# Patient Record
Sex: FEMALE | Race: WHITE | NOT HISPANIC OR LATINO | Employment: OTHER | URBAN - METROPOLITAN AREA
[De-identification: names, ages, dates, MRNs, and addresses within clinical notes are randomized per-mention and may not be internally consistent; named-entity substitution may affect disease eponyms.]

---

## 2017-01-03 ENCOUNTER — OFFICE VISIT (OUTPATIENT)
Dept: INTERNAL MEDICINE | Age: 62
End: 2017-01-03

## 2017-01-03 VITALS
WEIGHT: 203 LBS | SYSTOLIC BLOOD PRESSURE: 122 MMHG | HEART RATE: 77 BPM | OXYGEN SATURATION: 97 % | HEIGHT: 64 IN | DIASTOLIC BLOOD PRESSURE: 80 MMHG | BODY MASS INDEX: 34.66 KG/M2 | TEMPERATURE: 96.9 F

## 2017-01-03 DIAGNOSIS — N39.0 RECURRENT UTI: Primary | ICD-10-CM

## 2017-01-03 PROCEDURE — 99213 OFFICE O/P EST LOW 20 MIN: CPT | Performed by: INTERNAL MEDICINE

## 2017-01-03 RX ORDER — CEPHALEXIN 250 MG/1
250 CAPSULE ORAL 2 TIMES DAILY PRN
Qty: 20 CAPSULE | Refills: 0 | Status: SHIPPED | OUTPATIENT
Start: 2017-01-03 | End: 2017-02-08

## 2017-01-03 NOTE — MR AVS SNAPSHOT
Kaci Vazquezl   1/3/2017 1:00 PM   Office Visit    Dept Phone:  [phone]   Encounter #:  [id number]    Provider:  Sarwat Valenzuela MD   Department:  [facility] PRIMARY CARE                Your Full Care Plan              Today's Medication Changes          These changes are accurate as of: 1/3/17  2:58 PM.  If you have any questions, ask your nurse or doctor.               New Medication(s)Ordered:     cephalexin 250 MG capsule   Commonly known as:  KEFLEX   Take 1 capsule by mouth 2 (Two) Times a Day As Needed (UTI).   Started by:  Sarwat Valenzuela MD         Stop taking medication(s)listed here:     butalbital-acetaminophen  MG tablet tablet   Stopped by:  Irma Saez MA           cefuroxime 250 MG tablet   Commonly known as:  CEFTIN   Stopped by:  Irma Seaz MA           ciprofloxacin 500 MG tablet   Commonly known as:  CIPRO   Stopped by:  Irma Saez MA           metaxalone 800 MG tablet   Commonly known as:  SKELAXIN   Stopped by:  Irma Saez MA           Omega-3 Fish Oil 1000 MG capsule   Stopped by:  Irma Saez MA           ondansetron 4 MG tablet   Commonly known as:  ZOFRAN   Stopped by:  Irma Saez MA                Where to Get Your Medications      These medications were sent to [place] [phone]  - [phone] [address] [address]     Phone:  [phone]     cephalexin 250 MG capsule                  Your Updated Medication List          This list is accurate as of: 1/3/17  2:58 PM.  Always use your most recent med list.                acebutolol 200 MG capsule   Commonly known as:  SECTRAL       ALPRAZolam 1 MG tablet   Commonly known as:  XANAX   take 1 tablet by mouth twice a day if needed for anxiety       atorvastatin 20 MG tablet   Commonly known as:  LIPITOR   Take 1 tablet by mouth Daily.       butalbital-acetaminophen-caffeine -40 MG per tablet   Commonly known as:  FIORICET, ESGIC       cephalexin 250 MG capsule   Commonly known as:  KEFLEX   Take 1 capsule by mouth 2 (Two) Times a Day As Needed (UTI).       clonazePAM 0.5 MG disintegrating tablet   Commonly known as:  KlonoPIN       FLUoxetine 60 MG tablet   Commonly known as:  PROzac       fluticasone-salmeterol 500-50 MCG/DOSE DISKUS   Commonly known as:  ADVAIR DISKUS   Inhale 1 dose by mouth twice daily.       folic acid 1 MG tablet   Commonly known as:  FOLVITE   Take 1 tablet by mouth daily.       levothyroxine 50 MCG tablet   Commonly known as:  SYNTHROID   Take 1 tablet by mouth Daily.       montelukast 10 MG tablet   Commonly known as:  SINGULAIR   Take 1 tablet by mouth daily.       MULTIVITAMIN ADULT PO       naproxen 500 MG tablet   Commonly known as:  NAPROSYN   Take 1 tablet by mouth 2 (Two) Times a Day As Needed for mild pain (1-3).       nystatin 330970 UNIT/GM ointment   Commonly known as:  MYCOSTATIN       * PROAIR  (90 BASE) MCG/ACT inhaler   Generic drug:  albuterol       * PROAIR  (90 BASE) MCG/ACT inhaler   Generic drug:  albuterol   inhale 2 puffs by mouth every 6 hours if needed       Vitamin D3 2000 UNITS tablet       ZYRTEC ALLERGY 10 MG tablet   Generic drug:  cetirizine       * Notice:  This list has 2 medication(s) that are the same as other medications prescribed for you. Read the directions carefully, and ask your doctor or other care provider to review them with you.            You Were Diagnosed With        Codes Comments    Recurrent UTI    -  Primary ICD-10-CM: N39.0  ICD-9-CM: 599.0       Instructions     None    Patient Instructions History      Upcoming Appointments     Visit Type Date Time Department    HOSPITAL FOLLOW UP 1/3/2017  1:00 PM [facility]    LUMBAR EPIDURAL 3RD 1/5/2017  9:30 AM [facility] PAIN [facility]GT    OFFICE VISIT 2/8/2017 10:20 AM [facility]    [facility] 3/6/2017 11:20 AM  [facility]    NEW PATIENT 3/7/2017  8:00 AM [facility]    OFFICE VISIT 3/13/2017 11:45 AM [facility]      MyChart Signup     Our records indicate that you have an active [facility] [employer] account.    You can view your After Visit Summary by going to [employer] and logging in with your [employer] username and password.  If you don't have a [employer] username and password but a parent or guardian has access to your record, the parent or guardian should login with their own [employer] username and password and access your record to view the After Visit Summary.    If you have questions, you can email [email or web address] or call [phone] to talk to our [employer] staff.  Remember, [employer] is NOT to be used for urgent needs.  For medical emergencies, dial 911.               Other Info from Your Visit           Your Appointments     Jan 05, 2017  9:30 AM EST   Lumbar Epidural 3rd with TREATMENT RM 2 ABDIRASHID PAIN   [facility] PAIN [facility]GT ([place])    [address] [facility] [id number] [phone]            Feb 08, 2017 10:20 AM EST   Office Visit with Sarwat Valenzuela MD   [facility] PRIMARY CARE (--)    [address] [facility] [address]  [place] [id number] [phone]           Arrive 15 minutes prior to appointment.            Mar 06, 2017 11:20 AM EST   [facility] with [facility]OCR[facility], [facility]   [facility] ENDOCRINOLOGY (--)    [address] [facility] [address]  [facility] [address] [phone]            Mar 07, 2017  8:00 AM EST   New Patient with Ofe Bedoya MD   [facility] NEUROLOGY (--)    [address]  [facility] [address] [phone]           Bring all previous medical records and films, along with current medications and insurance information.            Mar 13, 2017 11:45 AM EDT   Office Visit with Giorgi VIERA MD   [facility]  "ENDOCRINOLOGY (--)    [address] [facility] [place] [address] [phone]           Arrive 15 minutes prior to appointment.              Allergies     Codeine  Nausea And Vomiting      Reason for Visit     ER follow UTI 12- pt has had 3 UTI since then      Vital Signs     Blood Pressure Pulse Temperature Height Weight Oxygen Saturation    122/80 77 96.9 °F (36.1 °C) 64\" (162.6 cm) 203 lb (92.1 kg) 97%    Body Mass Index Smoking Status                34.84 kg/m2 Current Every Day Smoker          Problems and Diagnoses Noted     Recurrent UTI    -  Primary        "

## 2017-01-03 NOTE — PROGRESS NOTES
"Kaci A [place] / 61 y.o. / female  01/03/2017    VITALS    Visit Vitals   • /80   • Pulse 77   • Temp 96.9 °F (36.1 °C)   • Ht 64\" (162.6 cm)   • Wt 203 lb (92.1 kg)   • SpO2 97%   • BMI 34.84 kg/m2     BP Readings from Last 3 Encounters:   01/03/17 122/80   12/22/16 129/55   12/06/16 148/72     Wt Readings from Last 3 Encounters:   01/03/17 203 lb (92.1 kg)   12/21/16 197 lb (89.4 kg)   12/06/16 198 lb (89.8 kg)      Body mass index is 34.84 kg/(m^2).    CC:  Main reason(s) for today's visit: ER follow UTI 12- (pt has had 3 UTI since then)      HPI:     Recurrent UTI's and has been seen in ED and treated for UTI's several times. Most recent urine culture was negative for growth. Previous culture was positive for E. Coli susceptible to all abx's. Not sexually active.     ____________________________________________________________________    ASSESSMENT & PLAN:    1. Recurrent UTI      No orders of the defined types were placed in this encounter.      Summary/Discussion:     · Rx sent for Keflex for symptomatic treatment.       No Follow-up on file.    Future Appointments  Date Time Provider Department Center   1/5/2017 9:30 AM TREATMENT RM 2 [facility] PAIN [facility]   2/8/2017 10:20 AM MD YULIET Galeas PC [facility] None   3/6/2017 11:20 AM [facility] ENDOCRINOLOGY [facility], [facility] None   3/7/2017 8:00 AM MD YULIET Garcia N ESPT None   3/13/2017 11:45 AM MD YULIET Richards END [facility] None       ____________________________________________________________________    REVIEW OF SYSTEMS    Review of Systems  Other: As noted per HPI  No current dysuria, no hematuria  No abd pain    PHYSICAL EXAMINATION    Physical Exam  Alert, no distress    REVIEWED DATA:    Labs:   Urine Culture   Order: [id number] - Reflex for Order [id number]   Status:  Final result Visible to patient:  Yes (MyChart)   Specimen Information: Urine, Clean Catch; Urine        Culture   No growth      Resulting Agency: [facility] "      Specimen Collected: 12/21/16  9:49 PM                Lab Results   Component Value Date     12/21/2016    K 4.0 12/21/2016    AST 23 12/21/2016    ALT 24 12/21/2016    BUN 14 12/21/2016    CREATININE 0.78 12/21/2016    CREATININE 0.74 11/19/2016    CREATININE 0.70 02/23/2016    EGFRIFNONA 75 12/21/2016    EGFRIFAFRI 103 02/23/2016     Lab Results   Component Value Date    GLU 96 02/23/2016    GLU 92 04/09/2015    HGBA1C 5.3 02/23/2016    HGBA1C 5.4 04/09/2015     Lab Results   Component Value Date     02/23/2016     (H) 04/09/2015    HDL 32 (L) 02/23/2016    TRIG 359 (H) 02/23/2016     Lab Results   Component Value Date    TSH 0.498 07/08/2016    FREET4 0.98 07/08/2016     Lab Results   Component Value Date    WBC 7.37 12/21/2016    HGB 13.7 12/21/2016     12/21/2016        Imaging:        Medical Tests:        Summary of old records / correspondence / consultant report:        Request outside records:         ALLERGIES:    Codeine    MEDICATIONS:  Current Outpatient Prescriptions on File Prior to Visit   Medication Sig Dispense Refill   • acebutolol (SECTRAL) 200 MG capsule Take 200 mg by mouth Daily.     • albuterol (PROAIR HFA) 108 (90 BASE) MCG/ACT inhaler 2 puffs 2 (Two) Times a Day As Needed.     • ALPRAZolam (XANAX) 1 MG tablet take 1 tablet by mouth twice a day if needed for anxiety 50 tablet 0   • atorvastatin (LIPITOR) 20 MG tablet Take 1 tablet by mouth Daily. 30 tablet 2   • butalbital-acetaminophen-caffeine (FIORICET, ESGIC) -40 MG per tablet As Needed.  0   • cetirizine (ZYRTEC ALLERGY) 10 MG tablet Take 10 mg by mouth daily.     • Cholecalciferol (VITAMIN D3) 2000 UNITS tablet Take  by mouth daily.     • clonazePAM (KlonoPIN) 0.5 MG disintegrating tablet Take 0.5 mg by mouth As Needed (vertigo).     • FLUoxetine (PROzac) 60 MG tablet Take 60 mg by mouth Daily.     • fluticasone-salmeterol (ADVAIR DISKUS) 500-50 MCG/DOSE DISKUS Inhale 1 dose by mouth twice daily.  60 each 5   • folic acid (FOLVITE) 1 MG tablet Take 1 tablet by mouth daily. 30 tablet 5   • levothyroxine (SYNTHROID) 50 MCG tablet Take 1 tablet by mouth Daily. 30 tablet 5   • montelukast (SINGULAIR) 10 MG tablet Take 1 tablet by mouth daily. 30 tablet 5   • Multiple Vitamins-Minerals (MULTIVITAMIN ADULT PO) Take  by mouth daily.     • naproxen (NAPROSYN) 500 MG tablet Take 1 tablet by mouth 2 (Two) Times a Day As Needed for mild pain (1-3). 15 tablet 0   • nystatin (MYCOSTATIN) 671203 UNIT/GM ointment Apply 1 application topically As Needed.     • PROAIR  (90 BASE) MCG/ACT inhaler inhale 2 puffs by mouth every 6 hours if needed 8.5 inhaler 1   • [DISCONTINUED] butalbital-acetaminophen  MG tablet tablet Take 1 tablet by mouth 2 (Two) Times a Day As Needed (tension/migraine headaches). 60 tablet 1   • [DISCONTINUED] cefuroxime (CEFTIN) 250 MG tablet Take 1 tablet by mouth 2 (Two) Times a Day. 20 tablet 0   • [DISCONTINUED] ciprofloxacin (CIPRO) 500 MG tablet Take 1 tablet by mouth 2 (Two) Times a Day. 14 tablet 0   • [DISCONTINUED] metaxalone (SKELAXIN) 800 MG tablet Take 1 tablet by mouth 3 (Three) Times a Day As Needed for muscle spasms. 15 tablet 0   • [DISCONTINUED] Omega-3 Fatty Acids (OMEGA-3 FISH OIL) 1000 MG capsule Take 4 capsules by mouth daily. 90 each    • [DISCONTINUED] ondansetron (ZOFRAN) 4 MG tablet 1-2 tabs every 8 hours prn nausea 21 tablet 0     Current Facility-Administered Medications on File Prior to Visit   Medication Dose Route Frequency Provider Last Rate Last Dose   • FentaNYL Citrate (PF) (SUBLIMAZE) injection 50 mcg  50 mcg Intravenous Q5 Min PRN Sandra Carty MD       • iopamidol (ISOVUE-M 200) injection 41%  12 mL Epidural Once in imaging Sandra Carty MD       • lidocaine (XYLOCAINE) 1 % injection 1 mL  1 mL Intradermal Once Sandra Carty MD       • methylPREDNISolone acetate (DEPO-medrol) injection 80 mg  80 mg Intra-articular Once Sandra Carty  MD       • midazolam (VERSED) injection 1 mg  1 mg Intravenous Q5 Min PRN Sandra Carty MD       • sodium chloride 0.9 % flush 1-10 mL  1-10 mL Intravenous PRN Sandra Carty MD            Replaced by [facility]    The following portions of the patient's history were reviewed and updated as appropriate: Allergies / Current Medications / Past Medical History / Surgical History / Social History / Family History    PROBLEM LIST:    Patient Active Problem List   Diagnosis   • Atopic rhinitis   • Migraine without aura and without status migrainosus, not intractable   • Common variable agammaglobulinemia   • Mixed anxiety depressive disorder   • Fibromyalgia   • Hyperlipidemia   • Multinodular goiter   • Osteoarthritis   • Asthmatic bronchitis   • Subclinical hypothyroidism   • Chronic mixed headache syndrome   • Impaired fasting glucose   • Palpitations   • Vertigo       PAST MEDICAL HX:    Past Medical History   Diagnosis Date   • Allergic rhinitis    • Anxiety    • Arthritis      osteoporosis   • Asthma    • Bleeding disorder      hx of blood clots and PE     • CVID (common variable immunodeficiency)    • Deep vein thrombosis    • Depression    • Fibromyalgia    • Fibromyalgia, primary    • Headache    • Hyperlipidemia    • Hypothyroidism    • Low back pain    • Lupus      skin   • MVP (mitral valve prolapse)    • Osteopenia    • Osteoporosis    • Pneumonia    • Pulmonary embolism    • Sleep apnea      CPAP Machine #11   • Urinary tract infection    • Visual impairment        PAST SURGICAL HX:    Past Surgical History   Procedure Laterality Date   • Hysterectomy     • Neck surgery       C 5 AND 6 TITANIUM PLATE   • Spine surgery       titanium plate C5-6   • No past surgeries     • Knee arthroscopy       calcium scraping   • Cholecystectomy     • Skin biopsy       benign   • Epidural block injection     • Cardiac catheterization     • Partial hysterectomy     • Colonoscopy         SOCIAL HX:    Social History     Social  History   • Marital status:      Spouse name: N/A   • Number of children: N/A   • Years of education: N/A     Occupational History   • Unemployed currently      Social History Main Topics   • Smoking status: Current Every Day Smoker     Packs/day: 1.00     Years: 30.00     Types: Cigarettes   • Smokeless tobacco: Never Used   • Alcohol use No   • Drug use: No   • Sexual activity: Yes     Partners: Male     Birth control/ protection: Post-menopausal, Other      Comment: Hysterectomy     Other Topics Concern   • None     Social History Narrative       FAMILY HX:    Family History   Problem Relation Age of Onset   • Thyroid disease Mother      Hypothyroid   • No Known Problems Father      N/A   • Breast cancer Sister    • Cerebral aneurysm Sister    • Thyroid cancer Cousin    • Cerebral aneurysm Sister    • Arthritis Brother    • Cancer Sister      Breast   • Depression Sister    • Thyroid disease Sister      Hyperthyroid

## 2017-01-05 ENCOUNTER — APPOINTMENT (OUTPATIENT)
Dept: PAIN MEDICINE | Facility: HOSPITAL | Age: 62
End: 2017-01-05

## 2017-01-06 NOTE — TELEPHONE ENCOUNTER
Last OV 1/3/17  Next OV 2/8/17  UDS Submitted 12/28/16  Last RF 11/8/16 #60 +1  Last KSP On File  Contract signed 9/27/16    KD

## 2017-01-06 NOTE — TELEPHONE ENCOUNTER
Apologies....  Last OV 1/3/17  Next OV 2/8/17  UDS Submitted 12/28/16  Last RF 11/8/16 #60 +1  Last KSP On File  Contract signed 9/27/16     KD

## 2017-01-09 RX ORDER — BUTALBITAL, ACETAMINOPHEN AND CAFFEINE 50; 325; 40 MG/1; MG/1; MG/1
TABLET ORAL
Qty: 60 TABLET | Refills: 1 | OUTPATIENT
Start: 2017-01-09 | End: 2017-03-09 | Stop reason: SDUPTHER

## 2017-01-12 DIAGNOSIS — F32.A ANXIETY AND DEPRESSION: ICD-10-CM

## 2017-01-12 DIAGNOSIS — F41.9 ANXIETY AND DEPRESSION: ICD-10-CM

## 2017-01-12 RX ORDER — FLUOXETINE HYDROCHLORIDE 60 MG/1
TABLET, FILM COATED ORAL; ORAL
Qty: 30 TABLET | Refills: 2 | Status: SHIPPED | OUTPATIENT
Start: 2017-01-12 | End: 2017-04-10 | Stop reason: SDUPTHER

## 2017-01-27 RX ORDER — FLUCONAZOLE 100 MG/1
TABLET ORAL
Qty: 14 TABLET | Refills: 0 | OUTPATIENT
Start: 2017-01-27

## 2017-01-30 RX ORDER — FLUCONAZOLE 100 MG/1
TABLET ORAL
Qty: 14 TABLET | Refills: 0 | OUTPATIENT
Start: 2017-01-30

## 2017-02-02 ENCOUNTER — LAB REQUISITION (OUTPATIENT)
Dept: LAB | Facility: HOSPITAL | Age: 62
End: 2017-02-02

## 2017-02-02 DIAGNOSIS — E78.5 HYPERLIPIDEMIA, UNSPECIFIED HYPERLIPIDEMIA TYPE: ICD-10-CM

## 2017-02-02 DIAGNOSIS — Z00.00 ENCOUNTER FOR GENERAL ADULT MEDICAL EXAMINATION WITHOUT ABNORMAL FINDINGS: ICD-10-CM

## 2017-02-02 PROCEDURE — 87086 URINE CULTURE/COLONY COUNT: CPT | Performed by: UROLOGY

## 2017-02-02 RX ORDER — ATORVASTATIN CALCIUM 20 MG/1
TABLET, FILM COATED ORAL
Qty: 30 TABLET | Refills: 2 | Status: SHIPPED | OUTPATIENT
Start: 2017-02-02 | End: 2017-05-05 | Stop reason: SDUPTHER

## 2017-02-04 LAB — BACTERIA SPEC AEROBE CULT: NO GROWTH

## 2017-02-06 DIAGNOSIS — J45.909 UNCOMPLICATED ASTHMA, UNSPECIFIED ASTHMA SEVERITY: ICD-10-CM

## 2017-02-08 ENCOUNTER — OFFICE VISIT (OUTPATIENT)
Dept: INTERNAL MEDICINE | Age: 62
End: 2017-02-08

## 2017-02-08 VITALS
BODY MASS INDEX: 34.66 KG/M2 | HEIGHT: 64 IN | OXYGEN SATURATION: 96 % | SYSTOLIC BLOOD PRESSURE: 116 MMHG | DIASTOLIC BLOOD PRESSURE: 64 MMHG | HEART RATE: 80 BPM | TEMPERATURE: 97.7 F | WEIGHT: 203 LBS

## 2017-02-08 DIAGNOSIS — I71.40 ABDOMINAL AORTIC ANEURYSM (AAA) WITHOUT RUPTURE (HCC): Chronic | ICD-10-CM

## 2017-02-08 DIAGNOSIS — E03.8 SUBCLINICAL HYPOTHYROIDISM: Chronic | ICD-10-CM

## 2017-02-08 DIAGNOSIS — G44.89 CHRONIC MIXED HEADACHE SYNDROME: Primary | Chronic | ICD-10-CM

## 2017-02-08 DIAGNOSIS — G43.009 MIGRAINE WITHOUT AURA AND WITHOUT STATUS MIGRAINOSUS, NOT INTRACTABLE: Chronic | ICD-10-CM

## 2017-02-08 DIAGNOSIS — F41.8 MIXED ANXIETY DEPRESSIVE DISORDER: Chronic | ICD-10-CM

## 2017-02-08 PROCEDURE — 99214 OFFICE O/P EST MOD 30 MIN: CPT | Performed by: INTERNAL MEDICINE

## 2017-02-08 RX ORDER — FLUCONAZOLE 100 MG/1
100 TABLET ORAL DAILY
Qty: 14 TABLET | Refills: 0 | Status: SHIPPED | OUTPATIENT
Start: 2017-02-08 | End: 2017-05-09

## 2017-02-08 NOTE — PROGRESS NOTES
"Kaci Mcgee / 61 y.o. / female  02/08/2017    VITALS    Visit Vitals   • /64   • Pulse 80   • Temp 97.7 °F (36.5 °C)   • Ht 64\" (162.6 cm)   • Wt 203 lb (92.1 kg)   • SpO2 96%   • BMI 34.84 kg/m2     BP Readings from Last 3 Encounters:   02/08/17 116/64   01/03/17 122/80   12/22/16 129/55     Wt Readings from Last 3 Encounters:   02/08/17 203 lb (92.1 kg)   01/03/17 203 lb (92.1 kg)   12/21/16 197 lb (89.4 kg)      Body mass index is 34.84 kg/(m^2).    CC:  Main reason(s) for today's visit: Headache (3 month)      HPI:     History of chronic mixed headache syndrome with history of migraine headaches.  She is taking Sectral and Fioricet.  She has a future appointment with a neurologist for her chronic headaches.    History of chronic depression and anxiety.  She is taking fluoxetine 60 mg daily and alprazolam 1 mg 1-2 tablets daily.  She has also been taking clonazepam on needed basis for vertigo symptoms.    History of mild hypothyroidism on levothyroxine.    Outside imaging showed incidental finding of a small AAA \"2.8 cm\"    ____________________________________________________________________    ASSESSMENT & PLAN:    Problem List Items Addressed This Visit        High    Chronic mixed headache syndrome - Primary (Chronic)    Current Assessment & Plan     Continue Sectral and Fioricet.  Has appointment with neurologist.          Relevant Medications    acebutolol (SECTRAL) 200 MG capsule    butalbital-acetaminophen-caffeine (FIORICET, ESGIC) -40 MG per tablet    FLUoxetine (PROzac) 60 MG tablet       Medium    Migraine without aura and without status migrainosus, not intractable (Chronic)    Relevant Medications    acebutolol (SECTRAL) 200 MG capsule    butalbital-acetaminophen-caffeine (FIORICET, ESGIC) -40 MG per tablet    FLUoxetine (PROzac) 60 MG tablet    Mixed anxiety depressive disorder (Chronic)    Current Assessment & Plan     Long d/w patient about need to wean down on BZDs. Stop " clonazepam. Will starting weaning down on alprazolam gradually. Continue fluoxetine 60 mg qd.          Relevant Medications    ALPRAZolam (XANAX) 1 MG tablet    FLUoxetine (PROzac) 60 MG tablet    Subclinical hypothyroidism (Chronic)    Overview     H/o MNG         Relevant Medications    acebutolol (SECTRAL) 200 MG capsule    levothyroxine (SYNTHROID) 50 MCG tablet    Other Relevant Orders    TSH+Free T4    Abdominal aortic aneurysm (AAA) without rupture (Chronic)    Overview     Noted on imaging outside. Will discuss on follow-up.              Orders Placed This Encounter   Procedures   • TSH+Free T4       Summary/Discussion:     ·       Return in about 3 months (around 5/8/2017) for F/U chronic medical problems.    Future Appointments  Date Time Provider Department Center   3/6/2017 11:20 AM [facility] None   3/7/2017 8:00 AM Ofe Bedoya MD [facility] None   3/13/2017 11:45 AM Giorgi VIERA MD [facility] None   5/9/2017 11:00 AM Sarwat Valenzuela MD [facility] None       ____________________________________________________________________    REVIEW OF SYSTEMS    Review of Systems  As noted per HPI  Constitutional neg   Resp neg   CV neg    Other: As noted per HPI      PHYSICAL EXAMINATION    Physical Exam  Constitutional  No distress   Cardiovascular Rate  normal . Rhythm: regular . Heart sounds:  normal    Pulmonary/Chest  Effort normal. Breath sounds:  normal   Psychiatric  Alert. Judgment and thought content normal. Mood dysphoric.     REVIEWED DATA:    Labs:   Lab Results   Component Value Date     12/21/2016    K 4.0 12/21/2016    AST 23 12/21/2016    ALT 24 12/21/2016    BUN 14 12/21/2016    CREATININE 0.78 12/21/2016    CREATININE 0.74 11/19/2016    CREATININE 0.70 02/23/2016    EGFRIFNONA 75 12/21/2016    EGFRIFAFRI 103 02/23/2016       Lab Results   Component Value Date    GLU 96 02/23/2016    GLU 92 04/09/2015    HGBA1C 5.3 02/23/2016    HGBA1C 5.4  04/09/2015       Lab Results   Component Value Date     02/23/2016     (H) 04/09/2015    HDL 32 (L) 02/23/2016    TRIG 359 (H) 02/23/2016       Lab Results   Component Value Date    TSH 0.498 07/08/2016    FREET4 0.98 07/08/2016          Lab Results   Component Value Date    WBC 7.37 12/21/2016    HGB 13.7 12/21/2016     12/21/2016        Imaging:        Medical Tests:        Summary of old records / correspondence / consultant report:        Request outside records:          ALLERGIES:    Codeine    MEDICATIONS:  Current Outpatient Prescriptions   Medication Sig Dispense Refill   • acebutolol (SECTRAL) 200 MG capsule Take 200 mg by mouth Daily.     • albuterol (PROAIR HFA) 108 (90 BASE) MCG/ACT inhaler 2 puffs 2 (Two) Times a Day As Needed.     • ALPRAZolam (XANAX) 1 MG tablet take 1 tablet by mouth twice a day if needed for anxiety 50 tablet 0   • atorvastatin (LIPITOR) 20 MG tablet take 1 tablet by mouth once daily 30 tablet 2   • butalbital-acetaminophen-caffeine (FIORICET, ESGIC) -40 MG per tablet take 1 tablet by mouth twice a day if needed FOR TENSION/MIGRAINE HEADACHES 60 tablet 1   • cetirizine (ZYRTEC ALLERGY) 10 MG tablet Take 10 mg by mouth daily.     • Cholecalciferol (VITAMIN D3) 2000 UNITS tablet Take  by mouth daily.     • FLUoxetine (PROzac) 60 MG tablet take 1 tablet by mouth once daily 30 tablet 2   • fluticasone-salmeterol (ADVAIR DISKUS) 500-50 MCG/DOSE DISKUS Inhale 1 dose by mouth twice daily. 60 each 5   • folic acid (FOLVITE) 1 MG tablet Take 1 tablet by mouth daily. 30 tablet 5   • levothyroxine (SYNTHROID) 50 MCG tablet Take 1 tablet by mouth Daily. 30 tablet 5   • montelukast (SINGULAIR) 10 MG tablet Take 1 tablet by mouth daily. 30 tablet 5   • PROAIR  (90 BASE) MCG/ACT inhaler inhale 2 puffs by mouth every 6 hours if needed 8.5 inhaler 1   • fluconazole (DIFLUCAN) 100 MG tablet Take 1 tablet by mouth Daily. 14 tablet 0     No current  facility-administered medications for this visit.        [facility]    The following portions of the patient's history were reviewed and updated as appropriate: Allergies / Current Medications / Past Medical History / Surgical History / Social History / Family History    PROBLEM LIST:    Patient Active Problem List   Diagnosis   • Atopic rhinitis   • Migraine without aura and without status migrainosus, not intractable   • Common variable agammaglobulinemia   • Mixed anxiety depressive disorder   • Fibromyalgia   • Hyperlipidemia   • Multinodular goiter   • Osteoarthritis   • Asthmatic bronchitis   • Subclinical hypothyroidism   • Chronic mixed headache syndrome   • Impaired fasting glucose   • Palpitations   • Vertigo   • Abdominal aortic aneurysm (AAA) without rupture       PAST MEDICAL HX:    Past Medical History   Diagnosis Date   • Allergic rhinitis    • Anxiety    • Arthritis      osteoporosis   • Asthma    • Bleeding disorder      hx of blood clots and PE     • CVID (common variable immunodeficiency)    • Deep vein thrombosis    • Depression    • Fibromyalgia    • Fibromyalgia, primary    • Headache    • Hyperlipidemia    • Hypothyroidism    • Low back pain    • Lupus      skin   • MVP (mitral valve prolapse)    • Osteopenia    • Osteoporosis    • Pneumonia    • Pulmonary embolism    • Sleep apnea      CPAP Machine #11   • Urinary tract infection    • Visual impairment        PAST SURGICAL HX:    Past Surgical History   Procedure Laterality Date   • Hysterectomy     • Neck surgery       C 5 AND 6 TITANIUM PLATE   • Spine surgery       titanium plate C5-6   • No past surgeries     • Knee arthroscopy       calcium scraping   • Cholecystectomy     • Skin biopsy       benign   • Epidural block injection     • Cardiac catheterization     • Partial hysterectomy     • Colonoscopy         SOCIAL HX:    Social History     Social History   • Marital status:      Spouse name: N/A   • Number of children: N/A   • Years  of education: N/A     Occupational History   • Unemployed currently      Social History Main Topics   • Smoking status: Current Every Day Smoker     Packs/day: 1.00     Years: 30.00     Types: Cigarettes   • Smokeless tobacco: Never Used   • Alcohol use No   • Drug use: No   • Sexual activity: Yes     Partners: Male     Birth control/ protection: Post-menopausal, Other      Comment: Hysterectomy     Other Topics Concern   • None     Social History Narrative       FAMILY HX:    Family History   Problem Relation Age of Onset   • Thyroid disease Mother      Hypothyroid   • No Known Problems Father      N/A   • Breast cancer Sister    • Cerebral aneurysm Sister    • Thyroid cancer Cousin    • Cerebral aneurysm Sister    • Arthritis Brother    • Cancer Sister      Breast   • Depression Sister    • Thyroid disease Sister      Hyperthyroid

## 2017-02-08 NOTE — ASSESSMENT & PLAN NOTE
Long d/w patient about need to wean down on BZDs. Stop clonazepam. Will starting weaning down on alprazolam gradually. Continue fluoxetine 60 mg qd.

## 2017-02-13 ENCOUNTER — OFFICE VISIT (OUTPATIENT)
Dept: INTERNAL MEDICINE | Age: 62
End: 2017-02-13

## 2017-02-13 VITALS
HEIGHT: 64 IN | OXYGEN SATURATION: 98 % | BODY MASS INDEX: 34.83 KG/M2 | DIASTOLIC BLOOD PRESSURE: 80 MMHG | WEIGHT: 204 LBS | HEART RATE: 104 BPM | SYSTOLIC BLOOD PRESSURE: 130 MMHG | TEMPERATURE: 97.9 F

## 2017-02-13 DIAGNOSIS — H10.32 ACUTE CONJUNCTIVITIS OF LEFT EYE, UNSPECIFIED ACUTE CONJUNCTIVITIS TYPE: Primary | ICD-10-CM

## 2017-02-13 PROCEDURE — 99213 OFFICE O/P EST LOW 20 MIN: CPT | Performed by: INTERNAL MEDICINE

## 2017-02-13 RX ORDER — TOBRAMYCIN 3 MG/ML
2 SOLUTION/ DROPS OPHTHALMIC
Qty: 1 BOTTLE | Refills: 0 | Status: SHIPPED | OUTPATIENT
Start: 2017-02-13 | End: 2017-05-08

## 2017-02-13 NOTE — PROGRESS NOTES
"Kaci Vazquezl / 61 y.o. / female  02/13/2017    VITALS    Visit Vitals   • /80   • Pulse 104   • Temp 97.9 °F (36.6 °C)   • Ht 64\" (162.6 cm)   • Wt 204 lb (92.5 kg)   • SpO2 98%   • BMI 35.02 kg/m2     BP Readings from Last 3 Encounters:   02/13/17 130/80   02/08/17 116/64   01/03/17 122/80     Wt Readings from Last 3 Encounters:   02/13/17 204 lb (92.5 kg)   02/08/17 203 lb (92.1 kg)   01/03/17 203 lb (92.1 kg)      Body mass index is 35.02 kg/(m^2).    CC:  Main reason(s) for today's visit: Pt thinks she has pink eye in her left eye (has bothered her since friday)      HPI:     C/o pink eye on left side w/ matting in the morning. Started Friday. No h/o foreign object getting in the eye. No pain or change in vision  Used some leftover eye abx drops and feel like it is improving.     ____________________________________________________________________    ASSESSMENT & PLAN:    1. Acute conjunctivitis of left eye, unspecified acute conjunctivitis type  If not improving by evening, start abx eye gtts; call if worsening or change in vision. She expressed understanding and agreed to follow above instructions.   - tobramycin 0.3 % solution ophthalmic solution; Administer 2 drops into the left eye Every 4 (Four) Hours.  Dispense: 1 bottle; Refill: 0      Summary/Discussion:     ·       No Follow-up on file.    Future Appointments  Date Time Provider Department Center   3/6/2017 11:20 AM [facility] None   3/7/2017 8:00 AM MD [facility] None   3/13/2017 11:45 AM MD [facility] None   5/9/2017 11:00 AM Sarwat Valenzuela MD [facility] None       ____________________________________________________________________    REVIEW OF SYSTEMS    Review of Systems  Other: As noted per HPI  Left pink eye, no change in vision, matting of lids  No recent uri    PHYSICAL EXAMINATION    Physical Exam   Constitutional: No distress.   Eyes: EOM and lids are normal. " Pupils are equal, round, and reactive to light. Right eye exhibits no chemosis, no discharge and no exudate. No foreign body present in the right eye. Left eye exhibits no chemosis, no discharge and no exudate. No foreign body present in the left eye. Right conjunctiva is not injected. Right conjunctiva has no hemorrhage. Left conjunctiva is not injected. Left conjunctiva has no hemorrhage.         REVIEWED DATA:    Labs:     Lab Results   Component Value Date     12/21/2016    K 4.0 12/21/2016    AST 23 12/21/2016    ALT 24 12/21/2016    BUN 14 12/21/2016    CREATININE 0.78 12/21/2016    CREATININE 0.74 11/19/2016    CREATININE 0.70 02/23/2016    EGFRIFNONA 75 12/21/2016    EGFRIFAFRI 103 02/23/2016     Lab Results   Component Value Date    GLU 96 02/23/2016    GLU 92 04/09/2015    HGBA1C 5.3 02/23/2016    HGBA1C 5.4 04/09/2015     Lab Results   Component Value Date     02/23/2016     (H) 04/09/2015    HDL 32 (L) 02/23/2016    TRIG 359 (H) 02/23/2016     Lab Results   Component Value Date    TSH 0.498 07/08/2016    FREET4 0.98 07/08/2016     Lab Results   Component Value Date    WBC 7.37 12/21/2016    HGB 13.7 12/21/2016     12/21/2016        Imaging:        Medical Tests:        Summary of old records / correspondence / consultant report:        Request outside records:         ALLERGIES:    Codeine    MEDICATIONS:  Current Outpatient Prescriptions   Medication Sig Dispense Refill   • acebutolol (SECTRAL) 200 MG capsule Take 200 mg by mouth Daily.     • albuterol (PROAIR HFA) 108 (90 BASE) MCG/ACT inhaler 2 puffs 2 (Two) Times a Day As Needed.     • ALPRAZolam (XANAX) 1 MG tablet take 1 tablet by mouth twice a day if needed for anxiety 50 tablet 0   • atorvastatin (LIPITOR) 20 MG tablet take 1 tablet by mouth once daily 30 tablet 2   • butalbital-acetaminophen-caffeine (FIORICET, ESGIC) -40 MG per tablet take 1 tablet by mouth twice a day if needed FOR TENSION/MIGRAINE HEADACHES 60  tablet 1   • cetirizine (ZYRTEC ALLERGY) 10 MG tablet Take 10 mg by mouth daily.     • Cholecalciferol (VITAMIN D3) 2000 UNITS tablet Take  by mouth daily.     • fluconazole (DIFLUCAN) 100 MG tablet Take 1 tablet by mouth Daily. 14 tablet 0   • FLUoxetine (PROzac) 60 MG tablet take 1 tablet by mouth once daily 30 tablet 2   • fluticasone-salmeterol (ADVAIR DISKUS) 500-50 MCG/DOSE DISKUS Inhale 1 dose by mouth twice daily. 60 each 5   • folic acid (FOLVITE) 1 MG tablet Take 1 tablet by mouth daily. 30 tablet 5   • levothyroxine (SYNTHROID) 50 MCG tablet Take 1 tablet by mouth Daily. 30 tablet 5   • montelukast (SINGULAIR) 10 MG tablet Take 1 tablet by mouth daily. 30 tablet 5   • PROAIR  (90 BASE) MCG/ACT inhaler inhale 2 puffs by mouth every 6 hours if needed 8.5 inhaler 1   • tobramycin 0.3 % solution ophthalmic solution Administer 2 drops into the left eye Every 4 (Four) Hours. 1 bottle 0     No current facility-administered medications for this visit.         [facility]    The following portions of the patient's history were reviewed and updated as appropriate: Allergies / Current Medications / Past Medical History / Surgical History / Social History / Family History    PROBLEM LIST:    Patient Active Problem List   Diagnosis   • Atopic rhinitis   • Migraine without aura and without status migrainosus, not intractable   • Common variable agammaglobulinemia   • Mixed anxiety depressive disorder   • Fibromyalgia   • Hyperlipidemia   • Multinodular goiter   • Osteoarthritis   • Asthmatic bronchitis   • Subclinical hypothyroidism   • Chronic mixed headache syndrome   • Impaired fasting glucose   • Palpitations   • Vertigo   • Abdominal aortic aneurysm (AAA) without rupture       PAST MEDICAL HX:    Past Medical History   Diagnosis Date   • Allergic rhinitis    • Anxiety    • Arthritis      osteoporosis   • Asthma    • Bleeding disorder      hx of blood clots and PE     • CVID (common variable immunodeficiency)    •  Deep vein thrombosis    • Depression    • Fibromyalgia    • Fibromyalgia, primary    • Headache    • Hyperlipidemia    • Hypothyroidism    • Low back pain    • Lupus      skin   • MVP (mitral valve prolapse)    • Osteopenia    • Osteoporosis    • Pneumonia    • Pulmonary embolism    • Sleep apnea      CPAP Machine #11   • Urinary tract infection    • Visual impairment        PAST SURGICAL HX:    Past Surgical History   Procedure Laterality Date   • Hysterectomy     • Neck surgery       C 5 AND 6 TITANIUM PLATE   • Spine surgery       titanium plate C5-6   • No past surgeries     • Knee arthroscopy       calcium scraping   • Cholecystectomy     • Skin biopsy       benign   • Epidural block injection     • Cardiac catheterization     • Partial hysterectomy     • Colonoscopy         SOCIAL HX:    Social History     Social History   • Marital status:      Spouse name: N/A   • Number of children: N/A   • Years of education: N/A     Occupational History   • Unemployed currently      Social History Main Topics   • Smoking status: Current Every Day Smoker     Packs/day: 1.00     Years: 30.00     Types: Cigarettes   • Smokeless tobacco: Never Used   • Alcohol use No   • Drug use: No   • Sexual activity: Yes     Partners: Male     Birth control/ protection: Post-menopausal, Other      Comment: Hysterectomy     Other Topics Concern   • None     Social History Narrative       FAMILY HX:    Family History   Problem Relation Age of Onset   • Thyroid disease Mother      Hypothyroid   • No Known Problems Father      N/A   • Breast cancer Sister    • Cerebral aneurysm Sister    • Thyroid cancer Cousin    • Cerebral aneurysm Sister    • Arthritis Brother    • Cancer Sister      Breast   • Depression Sister    • Thyroid disease Sister      Hyperthyroid

## 2017-03-06 ENCOUNTER — LAB (OUTPATIENT)
Dept: ENDOCRINOLOGY | Age: 62
End: 2017-03-06

## 2017-03-06 DIAGNOSIS — R73.01 IMPAIRED FASTING GLUCOSE: ICD-10-CM

## 2017-03-06 DIAGNOSIS — E04.2 MULTINODULAR GOITER: ICD-10-CM

## 2017-03-06 DIAGNOSIS — E03.8 SUBCLINICAL HYPOTHYROIDISM: Primary | ICD-10-CM

## 2017-03-06 DIAGNOSIS — F41.8 MIXED ANXIETY AND DEPRESSIVE DISORDER: Primary | ICD-10-CM

## 2017-03-06 DIAGNOSIS — E03.8 SUBCLINICAL HYPOTHYROIDISM: ICD-10-CM

## 2017-03-07 LAB
ALBUMIN SERPL-MCNC: 4.2 G/DL (ref 3.5–5.2)
ALBUMIN/GLOB SERPL: 1.8 G/DL
ALP SERPL-CCNC: 159 U/L (ref 39–117)
ALT SERPL-CCNC: 23 U/L (ref 1–33)
AST SERPL-CCNC: 19 U/L (ref 1–32)
BILIRUB SERPL-MCNC: <0.2 MG/DL (ref 0.1–1.2)
BUN SERPL-MCNC: 11 MG/DL (ref 8–23)
BUN/CREAT SERPL: 14.5 (ref 7–25)
CALCIUM SERPL-MCNC: 9.7 MG/DL (ref 8.6–10.5)
CHLORIDE SERPL-SCNC: 99 MMOL/L (ref 98–107)
CO2 SERPL-SCNC: 27.2 MMOL/L (ref 22–29)
CREAT SERPL-MCNC: 0.76 MG/DL (ref 0.57–1)
GLOBULIN SER CALC-MCNC: 2.3 GM/DL
GLUCOSE SERPL-MCNC: 106 MG/DL (ref 65–99)
POTASSIUM SERPL-SCNC: 3.8 MMOL/L (ref 3.5–5.2)
PROT SERPL-MCNC: 6.5 G/DL (ref 6–8.5)
SODIUM SERPL-SCNC: 140 MMOL/L (ref 136–145)
T4 FREE SERPL-MCNC: 0.75 NG/DL (ref 0.93–1.7)
TSH SERPL DL<=0.005 MIU/L-ACNC: 2.32 MIU/ML (ref 0.27–4.2)

## 2017-03-07 RX ORDER — ALPRAZOLAM 1 MG/1
1 TABLET ORAL 2 TIMES DAILY PRN
Qty: 50 TABLET | Refills: 0 | OUTPATIENT
Start: 2017-03-07 | End: 2017-04-04 | Stop reason: SDUPTHER

## 2017-03-07 NOTE — TELEPHONE ENCOUNTER
Last OV 2/13/2017  Next OV 5/9/2017  Last RF 12/28/2016 #50 No refills  Last KSP On File  UDS Submitted 12/28/2016  Contract Signed and On File    KD

## 2017-03-09 DIAGNOSIS — G44.89 CHRONIC MIXED HEADACHE SYNDROME: ICD-10-CM

## 2017-03-09 DIAGNOSIS — G43.009 MIGRAINE WITHOUT AURA AND WITHOUT STATUS MIGRAINOSUS, NOT INTRACTABLE: Primary | ICD-10-CM

## 2017-03-09 RX ORDER — BUTALBITAL, ACETAMINOPHEN AND CAFFEINE 50; 325; 40 MG/1; MG/1; MG/1
TABLET ORAL
Qty: 60 TABLET | Refills: 1 | OUTPATIENT
Start: 2017-03-09 | End: 2017-05-11 | Stop reason: SDUPTHER

## 2017-03-09 NOTE — TELEPHONE ENCOUNTER
Last OV 2/13/2017  Next OV 5/9/2017  Last RF 1/9/2017 #60 +1  Last KSP On File  UDS Submitted 12/28/2016  Contract Signed and On File     KD

## 2017-03-14 ENCOUNTER — OFFICE VISIT (OUTPATIENT)
Dept: ENDOCRINOLOGY | Age: 62
End: 2017-03-14

## 2017-03-14 VITALS
BODY MASS INDEX: 35.3 KG/M2 | HEART RATE: 81 BPM | SYSTOLIC BLOOD PRESSURE: 110 MMHG | DIASTOLIC BLOOD PRESSURE: 60 MMHG | OXYGEN SATURATION: 94 % | HEIGHT: 64 IN | WEIGHT: 206.8 LBS

## 2017-03-14 DIAGNOSIS — E03.8 SUBCLINICAL HYPOTHYROIDISM: Primary | Chronic | ICD-10-CM

## 2017-03-14 DIAGNOSIS — E04.2 MULTINODULAR GOITER: Chronic | ICD-10-CM

## 2017-03-14 DIAGNOSIS — R63.5 ABNORMAL WEIGHT GAIN: ICD-10-CM

## 2017-03-14 PROCEDURE — 99214 OFFICE O/P EST MOD 30 MIN: CPT | Performed by: INTERNAL MEDICINE

## 2017-03-14 NOTE — PROGRESS NOTES
61 y.o.    Patient Care Team:  Sarwat Valenzuela MD as PCP - General (Internal Medicine)  Sarwat Valenzuela MD (Internal Medicine)    Chief Complaint:      F/U HYPOTHYROIDISM.  MULTINODULAR GOITER.  HERE TO DISCUSS LAB REUSLTS.  Subjective     HPI patient is a 61-year-old white female with a past history of multinodular goiter and hypothyroidism came for follow-up  Multinodular goiter  Patient underwent fine-needle aspiration biopsy in the biopsy was benign approximately one year ago  Patient denies any increase in swelling or difficulty swallowing or change in voice.  But she is concerned that her niece was just diagnosed with thyroid cancer and had surgery  Patient had an ultrasound in December 2016 and the nodules were in fact smaller  Hypothyroidism  Patient is currently taking levothyroxine 50 µg daily.  She reports compliance with the medication.  She denies any side effects.  Abnormal weight gain  Patient is currently 206 pounds with a BMI of 35.5.  She is gaining weight steadily.  Patient also reports that she was told she had 2.2 cm abdominal aortic aneurysm  Patient reported that her sister had hyperparathyroidism and elevated calcium levels        Interval History October 28, 2016     Summary  Patient reported that she started gaining weight suddenly since February 2016 and gained nearly 30 pounds. She was initially on levothyroxine himand since then the weight has stabilized and her fatigue is improved only slightly.     Fatigue and has been a significant problem for the past 6 months  Patient denied any dysphagia   She has history of forces her voice for the past several months.  She had a thyroid ultrasound done which revealed multiple nodules. Patient is also a smoker      Patient's first cousin had thyroid cancer approximately of her age  Patient also reported she had noncontrast studies in January 2016 including MRI      The patient reported that she is related to Dr. Giovana Mooney, and she was advised to  seek endocrinology opinion.      Patient reports weight gain, fatigue, horses a voice, concern for thyroid cancer since she has family history are. the primary issues     The following portions of the patient's history were reviewed and updated as appropriate: allergies, current medications, past family history, past medical history, past social history, past surgical history and problem list.    Past Medical History   Diagnosis Date   • Allergic rhinitis    • Anxiety    • Arthritis      osteoporosis   • Asthma    • Bleeding disorder      hx of blood clots and PE     • CVID (common variable immunodeficiency)    • Deep vein thrombosis    • Depression    • Fibromyalgia    • Fibromyalgia, primary    • Headache    • Hyperlipidemia    • Hypothyroidism    • Low back pain    • Lupus      skin   • MVP (mitral valve prolapse)    • Osteopenia    • Osteoporosis    • Pneumonia    • Pulmonary embolism    • Sleep apnea      CPAP Machine #11   • Urinary tract infection    • Visual impairment      Family History   Problem Relation Age of Onset   • Thyroid disease Mother      Hypothyroid   • No Known Problems Father      N/A   • Breast cancer Sister    • Cerebral aneurysm Sister    • Thyroid cancer Cousin    • Cerebral aneurysm Sister    • Arthritis Brother    • Cancer Sister      Breast   • Depression Sister    • Thyroid disease Sister      Hyperthyroid     Social History     Social History   • Marital status:      Spouse name: N/A   • Number of children: N/A   • Years of education: N/A     Occupational History   • Unemployed currently      Social History Main Topics   • Smoking status: Current Every Day Smoker     Packs/day: 1.00     Years: 30.00     Types: Cigarettes   • Smokeless tobacco: Never Used   • Alcohol use No   • Drug use: No   • Sexual activity: Yes     Partners: Male     Birth control/ protection: Post-menopausal, Other      Comment: Hysterectomy     Other Topics Concern   • Not on file     Social History  Narrative     Allergies   Allergen Reactions   • Codeine Nausea And Vomiting       Current Outpatient Prescriptions:   •  acebutolol (SECTRAL) 200 MG capsule, Take 200 mg by mouth Daily., Disp: , Rfl:   •  albuterol (PROAIR HFA) 108 (90 BASE) MCG/ACT inhaler, 2 puffs 2 (Two) Times a Day As Needed., Disp: , Rfl:   •  ALPRAZolam (XANAX) 1 MG tablet, Take 1 tablet by mouth 2 (Two) Times a Day As Needed for Anxiety., Disp: 50 tablet, Rfl: 0  •  atorvastatin (LIPITOR) 20 MG tablet, take 1 tablet by mouth once daily, Disp: 30 tablet, Rfl: 2  •  butalbital-acetaminophen-caffeine (FIORICET, ESGIC) -40 MG per tablet, take 1 tablet by mouth twice a day if needed FOR TENSION/MIGRAINE HEADACHES, Disp: 60 tablet, Rfl: 1  •  cetirizine (ZYRTEC ALLERGY) 10 MG tablet, Take 10 mg by mouth daily., Disp: , Rfl:   •  Cholecalciferol (VITAMIN D3) 2000 UNITS tablet, Take  by mouth daily., Disp: , Rfl:   •  fluconazole (DIFLUCAN) 100 MG tablet, Take 1 tablet by mouth Daily., Disp: 14 tablet, Rfl: 0  •  FLUoxetine (PROzac) 60 MG tablet, take 1 tablet by mouth once daily, Disp: 30 tablet, Rfl: 2  •  fluticasone-salmeterol (ADVAIR DISKUS) 500-50 MCG/DOSE DISKUS, Inhale 1 dose by mouth twice daily., Disp: 60 each, Rfl: 5  •  folic acid (FOLVITE) 1 MG tablet, Take 1 tablet by mouth daily., Disp: 30 tablet, Rfl: 5  •  levothyroxine (SYNTHROID) 50 MCG tablet, Take 1 tablet by mouth Daily., Disp: 30 tablet, Rfl: 5  •  montelukast (SINGULAIR) 10 MG tablet, Take 1 tablet by mouth daily., Disp: 30 tablet, Rfl: 5  •  PROAIR  (90 BASE) MCG/ACT inhaler, inhale 2 puffs by mouth every 6 hours if needed, Disp: 8.5 inhaler, Rfl: 1  •  tobramycin 0.3 % solution ophthalmic solution, Administer 2 drops into the left eye Every 4 (Four) Hours., Disp: 1 bottle, Rfl: 0        Review of Systems   Constitutional: Negative for chills, fatigue and fever.   Cardiovascular: Negative for chest pain and palpitations.   Gastrointestinal: Negative for  "abdominal pain, constipation, diarrhea, nausea and vomiting.   Endocrine: Negative for cold intolerance and heat intolerance.   All other systems reviewed and are negative.      Objective       Vitals:    03/14/17 0949   BP: 110/60   Pulse: 81   SpO2: 94%   Weight: 206 lb 12.8 oz (93.8 kg)   Height: 64\" (162.6 cm)     Body mass index is 35.5 kg/(m^2).      Physical Exam   Constitutional: She is oriented to person, place, and time. She appears well-developed and well-nourished.   HENT:   Head: Normocephalic and atraumatic.   Eyes: EOM are normal. Pupils are equal, round, and reactive to light.   Neck: Normal range of motion. Neck supple. Thyromegaly (nontender examination,moving well on swallowing) present.   Cardiovascular: Normal rate, regular rhythm, normal heart sounds and intact distal pulses.    Pulmonary/Chest: Effort normal and breath sounds normal.   Abdominal: Soft. Bowel sounds are normal. She exhibits no distension. There is no tenderness.   Musculoskeletal: Normal range of motion. She exhibits no edema.   Neurological: She is alert and oriented to person, place, and time. She has normal reflexes.   Skin: Skin is warm and dry. No rash noted.   Psychiatric: She has a normal mood and affect. Her behavior is normal.   Vitals reviewed.    Results Review:     I reviewed the patient's new clinical results.    Medical records reviewed  Summary:      Lab on 03/06/2017   Component Date Value Ref Range Status   • Glucose 03/06/2017 106* 65 - 99 mg/dL Final   • BUN 03/06/2017 11  8 - 23 mg/dL Final   • Creatinine 03/06/2017 0.76  0.57 - 1.00 mg/dL Final   • eGFR Non  Am 03/06/2017 77  >60 mL/min/1.73 Final   • eGFR African Am 03/06/2017 94  >60 mL/min/1.73 Final   • BUN/Creatinine Ratio 03/06/2017 14.5  7.0 - 25.0 Final   • Sodium 03/06/2017 140  136 - 145 mmol/L Final   • Potassium 03/06/2017 3.8  3.5 - 5.2 mmol/L Final   • Chloride 03/06/2017 99  98 - 107 mmol/L Final   • Total CO2 03/06/2017 27.2  22.0 - " 29.0 mmol/L Final   • Calcium 03/06/2017 9.7  8.6 - 10.5 mg/dL Final   • Total Protein 03/06/2017 6.5  6.0 - 8.5 g/dL Final   • Albumin 03/06/2017 4.20  3.50 - 5.20 g/dL Final   • Globulin 03/06/2017 2.3  gm/dL Final   • A/G Ratio 03/06/2017 1.8  g/dL Final   • Total Bilirubin 03/06/2017 <0.2  0.1 - 1.2 mg/dL Final   • Alkaline Phosphatase 03/06/2017 159* 39 - 117 U/L Final   • AST (SGOT) 03/06/2017 19  1 - 32 U/L Final   • ALT (SGPT) 03/06/2017 23  1 - 33 U/L Final   • Free T4 03/06/2017 0.75* 0.93 - 1.70 ng/dL Final   • TSH 03/06/2017 2.320  0.270 - 4.200 mIU/mL Final     Lab Results   Component Value Date    HGBA1C 5.3 02/23/2016    HGBA1C 5.4 04/09/2015     Lab Results   Component Value Date    CREATININE 0.76 03/06/2017     Imaging Results (most recent)     None                Assessment and Plan:    Kaci was seen today for hypothyroidism and goiter.    Diagnoses and all orders for this visit:    Subclinical hypothyroidism  -     US Thyroid; Future  -     TSH; Future  -     T4, Free; Future  -     T3; Future    Multinodular goiter  -     US Thyroid; Future  -     TSH; Future  -     T4, Free; Future  -     T3; Future    Abnormal weight gain        Patient is currently on levothyroxine 50 µg daily.  She reports compliance with the medication  Lab reports reviewed  TSH 2.2 in the normal range even though free T4 is on the low side  I recommend that she continue the current dose of levothyroxine 50 µg daily.    Patient's niece recently had thyroid cancer and had surgery at the age of 30  Patient reports that she had an ultrasound in December 2016 and the nodule size was smaller  I recommend she get another ultrasound done in June and July 2017    Patient will get lab testing done before next visit and as well as ultrasound  She will return to follow-up in 4 months    The total time spent for old record and lab review and face- to- face was more than 25 min of which greater than 50% of time was spent on counseling  the patient on recommended evaluation and treatment options, instructions for management/treatment and /or follow up  and importance of compliance with chosen management or treatment options    Giorgi Mcclellan MD. FACE    03/14/17      EMR Dragon / transcription disclaimer:    Much of this encounter note is an electronic transcription/ translation of spoken language to printed text.  Electronic translation of spoken language may permit erroneous, or at times, nonsensical words or phrases to be inadvertently transcribed; although I have reviewed the note for such errors, some may still exist.

## 2017-03-19 ENCOUNTER — RESULTS ENCOUNTER (OUTPATIENT)
Dept: ENDOCRINOLOGY | Age: 62
End: 2017-03-19

## 2017-03-19 DIAGNOSIS — E03.8 SUBCLINICAL HYPOTHYROIDISM: Chronic | ICD-10-CM

## 2017-03-19 DIAGNOSIS — E04.2 MULTINODULAR GOITER: Chronic | ICD-10-CM

## 2017-03-29 DIAGNOSIS — Z88.9 HISTORY OF MULTIPLE ALLERGIES: ICD-10-CM

## 2017-03-29 RX ORDER — MONTELUKAST SODIUM 10 MG/1
TABLET ORAL
Qty: 30 TABLET | Refills: 5 | Status: SHIPPED | OUTPATIENT
Start: 2017-03-29 | End: 2017-08-03 | Stop reason: SDUPTHER

## 2017-04-04 DIAGNOSIS — R51.9 CHRONIC DAILY HEADACHE: ICD-10-CM

## 2017-04-04 DIAGNOSIS — Z00.00 HEALTHCARE MAINTENANCE: ICD-10-CM

## 2017-04-04 DIAGNOSIS — F41.8 MIXED ANXIETY AND DEPRESSIVE DISORDER: ICD-10-CM

## 2017-04-04 RX ORDER — ACEBUTOLOL HYDROCHLORIDE 200 MG/1
200 CAPSULE ORAL DAILY
Qty: 30 CAPSULE | Refills: 5 | Status: SHIPPED | OUTPATIENT
Start: 2017-04-04 | End: 2017-10-03 | Stop reason: SDUPTHER

## 2017-04-04 RX ORDER — FOLIC ACID 1 MG/1
1 TABLET ORAL DAILY
Qty: 30 TABLET | Refills: 5 | Status: SHIPPED | OUTPATIENT
Start: 2017-04-04 | End: 2017-10-03 | Stop reason: SDUPTHER

## 2017-04-04 RX ORDER — ALPRAZOLAM 1 MG/1
1 TABLET ORAL 2 TIMES DAILY PRN
Qty: 50 TABLET | Refills: 0 | OUTPATIENT
Start: 2017-04-04 | End: 2017-05-11 | Stop reason: SDUPTHER

## 2017-04-04 NOTE — TELEPHONE ENCOUNTER
Last OV 2/13/17  Next OV 5/9/17  Last RF 3/7/17 #50 No Refills  Last KSP On File  UDS On File  Contract On File    KD

## 2017-04-10 DIAGNOSIS — J45.909 UNCOMPLICATED ASTHMA, UNSPECIFIED ASTHMA SEVERITY: ICD-10-CM

## 2017-04-10 DIAGNOSIS — F32.A ANXIETY AND DEPRESSION: ICD-10-CM

## 2017-04-10 DIAGNOSIS — F41.9 ANXIETY AND DEPRESSION: ICD-10-CM

## 2017-04-10 DIAGNOSIS — Z00.00 HEALTHCARE MAINTENANCE: ICD-10-CM

## 2017-04-10 RX ORDER — FLUOXETINE HYDROCHLORIDE 60 MG/1
TABLET, FILM COATED ORAL; ORAL
Qty: 30 TABLET | Refills: 2 | Status: SHIPPED | OUTPATIENT
Start: 2017-04-10 | End: 2017-07-10 | Stop reason: SDUPTHER

## 2017-05-05 DIAGNOSIS — E78.5 HYPERLIPIDEMIA, UNSPECIFIED HYPERLIPIDEMIA TYPE: ICD-10-CM

## 2017-05-05 RX ORDER — ATORVASTATIN CALCIUM 20 MG/1
20 TABLET, FILM COATED ORAL DAILY
Qty: 30 TABLET | Refills: 2 | Status: SHIPPED | OUTPATIENT
Start: 2017-05-05 | End: 2017-05-09 | Stop reason: SDUPTHER

## 2017-05-08 RX ORDER — FLUTICASONE PROPIONATE 50 MCG
SPRAY, SUSPENSION (ML) NASAL
Refills: 1 | COMMUNITY
Start: 2017-02-24 | End: 2017-08-03

## 2017-05-09 ENCOUNTER — OFFICE VISIT (OUTPATIENT)
Dept: INTERNAL MEDICINE | Age: 62
End: 2017-05-09

## 2017-05-09 VITALS
WEIGHT: 203 LBS | OXYGEN SATURATION: 96 % | TEMPERATURE: 98.3 F | HEART RATE: 70 BPM | SYSTOLIC BLOOD PRESSURE: 134 MMHG | DIASTOLIC BLOOD PRESSURE: 70 MMHG | HEIGHT: 64 IN | BODY MASS INDEX: 34.66 KG/M2

## 2017-05-09 DIAGNOSIS — I70.0 ABDOMINAL AORTIC ATHEROSCLEROSIS (HCC): Chronic | ICD-10-CM

## 2017-05-09 DIAGNOSIS — E03.8 SUBCLINICAL HYPOTHYROIDISM: Chronic | ICD-10-CM

## 2017-05-09 DIAGNOSIS — G44.89 CHRONIC MIXED HEADACHE SYNDROME: Primary | Chronic | ICD-10-CM

## 2017-05-09 DIAGNOSIS — E78.5 HYPERLIPIDEMIA, UNSPECIFIED HYPERLIPIDEMIA TYPE: ICD-10-CM

## 2017-05-09 DIAGNOSIS — D80.1 COMMON VARIABLE AGAMMAGLOBULINEMIA (HCC): Chronic | ICD-10-CM

## 2017-05-09 DIAGNOSIS — E78.2 MIXED HYPERLIPIDEMIA: Chronic | ICD-10-CM

## 2017-05-09 DIAGNOSIS — I73.9 PVD (PERIPHERAL VASCULAR DISEASE) (HCC): ICD-10-CM

## 2017-05-09 DIAGNOSIS — F41.8 MIXED ANXIETY DEPRESSIVE DISORDER: Chronic | ICD-10-CM

## 2017-05-09 PROCEDURE — 99214 OFFICE O/P EST MOD 30 MIN: CPT | Performed by: INTERNAL MEDICINE

## 2017-05-09 RX ORDER — CEPHALEXIN 500 MG/1
CAPSULE ORAL
Refills: 0 | COMMUNITY
Start: 2017-04-21 | End: 2017-05-09

## 2017-05-09 RX ORDER — ATORVASTATIN CALCIUM 40 MG/1
40 TABLET, FILM COATED ORAL NIGHTLY
Qty: 30 TABLET | Refills: 3 | Status: SHIPPED | OUTPATIENT
Start: 2017-05-09 | End: 2017-08-03 | Stop reason: SDUPTHER

## 2017-05-10 LAB
CHOLEST SERPL-MCNC: 168 MG/DL (ref 0–200)
CHOLEST/HDLC SERPL: 3.73 {RATIO}
HDLC SERPL-MCNC: 45 MG/DL (ref 40–60)
IGA SERPL-MCNC: 99 MG/DL (ref 87–352)
IGG SERPL-MCNC: 599 MG/DL (ref 700–1600)
IGM SERPL-MCNC: 214 MG/DL (ref 26–217)
LDLC SERPL CALC-MCNC: 102 MG/DL (ref 0–100)
T4 FREE SERPL-MCNC: 0.88 NG/DL (ref 0.93–1.7)
TRIGL SERPL-MCNC: 103 MG/DL (ref 0–150)
TSH SERPL DL<=0.005 MIU/L-ACNC: 0.47 MIU/ML (ref 0.27–4.2)
VLDLC SERPL CALC-MCNC: 20.6 MG/DL (ref 5–40)

## 2017-05-11 ENCOUNTER — TELEPHONE (OUTPATIENT)
Dept: INTERNAL MEDICINE | Age: 62
End: 2017-05-11

## 2017-05-11 DIAGNOSIS — E03.8 SUBCLINICAL HYPOTHYROIDISM: ICD-10-CM

## 2017-05-11 DIAGNOSIS — F41.8 MIXED ANXIETY AND DEPRESSIVE DISORDER: ICD-10-CM

## 2017-05-11 DIAGNOSIS — G44.89 CHRONIC MIXED HEADACHE SYNDROME: ICD-10-CM

## 2017-05-11 DIAGNOSIS — G43.009 MIGRAINE WITHOUT AURA AND WITHOUT STATUS MIGRAINOSUS, NOT INTRACTABLE: ICD-10-CM

## 2017-05-11 RX ORDER — LEVOTHYROXINE SODIUM 0.12 MG/1
TABLET ORAL
Qty: 30 TABLET | Refills: 3 | Status: SHIPPED | OUTPATIENT
Start: 2017-05-11 | End: 2018-01-08 | Stop reason: SDUPTHER

## 2017-05-11 RX ORDER — BUTALBITAL, ACETAMINOPHEN AND CAFFEINE 50; 325; 40 MG/1; MG/1; MG/1
TABLET ORAL
Qty: 60 TABLET | Refills: 1 | OUTPATIENT
Start: 2017-05-11 | End: 2017-07-12 | Stop reason: ALTCHOICE

## 2017-05-12 RX ORDER — ALPRAZOLAM 1 MG/1
1 TABLET ORAL 2 TIMES DAILY PRN
Qty: 44 TABLET | Refills: 0 | OUTPATIENT
Start: 2017-05-12 | End: 2017-06-29 | Stop reason: SDUPTHER

## 2017-05-16 ENCOUNTER — APPOINTMENT (OUTPATIENT)
Dept: CARDIOLOGY | Facility: HOSPITAL | Age: 62
End: 2017-05-16

## 2017-05-22 ENCOUNTER — APPOINTMENT (OUTPATIENT)
Dept: CARDIOLOGY | Facility: HOSPITAL | Age: 62
End: 2017-05-22

## 2017-06-13 ENCOUNTER — HOSPITAL ENCOUNTER (OUTPATIENT)
Dept: ULTRASOUND IMAGING | Facility: HOSPITAL | Age: 62
Discharge: HOME | End: 2017-06-13
Admitting: NURSE PRACTITIONER

## 2017-06-13 DIAGNOSIS — E04.2 MULTIPLE THYROID NODULES: ICD-10-CM

## 2017-06-13 PROCEDURE — 76536 US EXAM OF HEAD AND NECK: CPT

## 2017-06-29 DIAGNOSIS — F41.8 MIXED ANXIETY AND DEPRESSIVE DISORDER: ICD-10-CM

## 2017-06-29 RX ORDER — ALPRAZOLAM 1 MG/1
1 TABLET ORAL 2 TIMES DAILY PRN
Qty: 44 TABLET | Refills: 0 | OUTPATIENT
Start: 2017-06-29 | End: 2017-08-04 | Stop reason: SDUPTHER

## 2017-07-03 ENCOUNTER — TELEPHONE (OUTPATIENT)
Dept: INTERNAL MEDICINE | Age: 62
End: 2017-07-03

## 2017-07-03 DIAGNOSIS — E03.8 SUBCLINICAL HYPOTHYROIDISM: Primary | Chronic | ICD-10-CM

## 2017-07-03 NOTE — TELEPHONE ENCOUNTER
Patient said Dr Valenzuela wanted her to get her thyroid levels checked she originally thought she would get them checked at her other doctors but she found out her appt is not until august and recently she hasn't been feeling well. She would like us to put in the order here and if possible she would like to come in today. Please call her [phone]

## 2017-07-04 LAB
T4 FREE SERPL-MCNC: 1.03 NG/DL (ref 0.93–1.7)
TSH SERPL DL<=0.005 MIU/L-ACNC: 0.48 MIU/ML (ref 0.27–4.2)

## 2017-07-07 DIAGNOSIS — G44.89 CHRONIC MIXED HEADACHE SYNDROME: ICD-10-CM

## 2017-07-07 DIAGNOSIS — J45.909 UNCOMPLICATED ASTHMA, UNSPECIFIED ASTHMA SEVERITY: ICD-10-CM

## 2017-07-07 DIAGNOSIS — G43.009 MIGRAINE WITHOUT AURA AND WITHOUT STATUS MIGRAINOSUS, NOT INTRACTABLE: ICD-10-CM

## 2017-07-10 DIAGNOSIS — F41.9 ANXIETY AND DEPRESSION: ICD-10-CM

## 2017-07-10 DIAGNOSIS — F32.A ANXIETY AND DEPRESSION: ICD-10-CM

## 2017-07-10 RX ORDER — FLUOXETINE HYDROCHLORIDE 60 MG/1
TABLET, FILM COATED ORAL; ORAL
Qty: 30 TABLET | Refills: 2 | Status: SHIPPED | OUTPATIENT
Start: 2017-07-10 | End: 2017-08-03 | Stop reason: SDUPTHER

## 2017-07-12 ENCOUNTER — TELEPHONE (OUTPATIENT)
Dept: INTERNAL MEDICINE | Age: 62
End: 2017-07-12

## 2017-07-12 DIAGNOSIS — G43.009 MIGRAINE WITHOUT AURA AND WITHOUT STATUS MIGRAINOSUS, NOT INTRACTABLE: Chronic | ICD-10-CM

## 2017-07-12 DIAGNOSIS — G44.89 CHRONIC MIXED HEADACHE SYNDROME: Chronic | ICD-10-CM

## 2017-07-12 DIAGNOSIS — G44.89 CHRONIC MIXED HEADACHE SYNDROME: Primary | Chronic | ICD-10-CM

## 2017-07-12 DIAGNOSIS — G43.009 MIGRAINE WITHOUT AURA AND WITHOUT STATUS MIGRAINOSUS, NOT INTRACTABLE: Primary | Chronic | ICD-10-CM

## 2017-07-12 RX ORDER — BUTALBITAL/ACETAMINOPHEN 50MG-325MG
TABLET ORAL
Qty: 60 TABLET | Refills: 1 | Status: CANCELLED | OUTPATIENT
Start: 2017-07-12

## 2017-07-12 RX ORDER — BUTALBITAL, ACETAMINOPHEN AND CAFFEINE 50; 325; 40 MG/1; MG/1; MG/1
TABLET ORAL
Qty: 60 TABLET | Refills: 1 | OUTPATIENT
Start: 2017-07-12

## 2017-07-12 NOTE — TELEPHONE ENCOUNTER
Pt called stating she has had symptoms of UTI for three days now. Pt said, she can't void, she has to force herself to go and it's only a dribble, pain on both sides of lower back, and lightheaded. Pt denies any fever or nausea.  Pt said she was seen at [facility] but they can't get her in soon enough. Pt stated she was told she has to be on antibiotic longer than the average person. She last took Cephalexin 500 mg for a month.   Pt asking for an antibiotic or appointment.  [facility]  Pt's # [phone]  Thanks SP

## 2017-07-12 NOTE — TELEPHONE ENCOUNTER
FAISALI: pt called canceled todays appt. Pt stated she found the antibiotic bottle, that her urologist prescribed last time and it had a refill on it. Pt is going to take the antibiotic.  Thanks SP

## 2017-07-12 NOTE — TELEPHONE ENCOUNTER
Pt called stating she has had symptoms of UTI for three days now. Pt said, she can't void, she has to force herself to go and it's only a dribble, pain on both sides of lower back, and lightheaded. Pt denies any fever or nausea.  Pt said she was seen at [facility] but they can't get her in soon enough. Pt stated she was told she has to be on antibiotic longer than the average person. She last took Cephalexin 500 mg for a month.   Pt asking for an antibiotic or appointment.  [facility]  Pt's # [phone]  Thanks SP

## 2017-07-12 NOTE — ASSESSMENT & PLAN NOTE
Change Rx for butalbital-acetaminophen-caffeine -40mg tablet to Butalbital-Acetaminophen 50-325mg per tablet, w/o caffeine, d/t history of anxiety per Dr. Valenzuela.    KD

## 2017-07-12 NOTE — TELEPHONE ENCOUNTER
Pt claim that this is not that urgent of an issue she is able to void, is have LBP difficult urinating with some discomfort, states she gets these all the time.

## 2017-07-14 RX ORDER — BUTALBITAL/ACETAMINOPHEN 50MG-325MG
TABLET ORAL
Qty: 60 TABLET | Refills: 0 | OUTPATIENT
Start: 2017-07-14 | End: 2017-08-11 | Stop reason: SDUPTHER

## 2017-07-18 ENCOUNTER — TELEPHONE (OUTPATIENT)
Dept: INTERNAL MEDICINE | Age: 62
End: 2017-07-18

## 2017-07-21 ENCOUNTER — OFFICE VISIT (OUTPATIENT)
Dept: INTERNAL MEDICINE | Age: 62
End: 2017-07-21

## 2017-07-21 VITALS
SYSTOLIC BLOOD PRESSURE: 122 MMHG | OXYGEN SATURATION: 98 % | WEIGHT: 202 LBS | HEIGHT: 64 IN | HEART RATE: 68 BPM | TEMPERATURE: 98.1 F | BODY MASS INDEX: 34.49 KG/M2 | DIASTOLIC BLOOD PRESSURE: 68 MMHG

## 2017-07-21 DIAGNOSIS — F17.219 CIGARETTE NICOTINE DEPENDENCE WITH NICOTINE-INDUCED DISORDER: ICD-10-CM

## 2017-07-21 DIAGNOSIS — F41.9 ANXIETY: Primary | ICD-10-CM

## 2017-07-21 DIAGNOSIS — J45.30 ASTHMATIC BRONCHITIS, MILD PERSISTENT, UNCOMPLICATED: Primary | Chronic | ICD-10-CM

## 2017-07-21 DIAGNOSIS — I70.0 ABDOMINAL AORTIC ATHEROSCLEROSIS (HCC): Chronic | ICD-10-CM

## 2017-07-21 PROCEDURE — 99214 OFFICE O/P EST MOD 30 MIN: CPT | Performed by: INTERNAL MEDICINE

## 2017-07-21 RX ORDER — BUPROPION HYDROCHLORIDE 150 MG/1
150 TABLET, EXTENDED RELEASE ORAL 2 TIMES DAILY
Qty: 60 TABLET | Refills: 1 | Status: SHIPPED | OUTPATIENT
Start: 2017-07-21 | End: 2017-12-13 | Stop reason: SDUPTHER

## 2017-07-21 NOTE — PROGRESS NOTES
"Kaci Vazquezl / 62 y.o. / female  07/21/2017    VITALS    /68  Pulse 68  Temp 98.1 °F (36.7 °C)  Ht 64\" (162.6 cm)  Wt 202 lb (91.6 kg)  SpO2 98%  BMI 34.67 kg/m2  BP Readings from Last 3 Encounters:   07/21/17 122/68   05/09/17 134/70   03/14/17 110/60     Wt Readings from Last 3 Encounters:   07/21/17 202 lb (91.6 kg)   05/09/17 203 lb (92.1 kg)   03/14/17 206 lb 12.8 oz (93.8 kg)      Body mass index is 34.67 kg/(m^2).    CC:  Main reason(s) for today's visit: Nicotine Dependence (discuss medication)      HPI:     Planned TKR in August. She wants help to quit smoking. Smokes 1 ppd, >40 years hx. Last time she was Successfully able to stop smoking was more than 10 years ago with Wellbutrin.  She has tried Chantix in the past which did not help her.  She has history of depression with anxiety on fluoxetine.  Denies worsening depression or anxiety symptoms.  She has history of asthmatic bronchitis and atherosclerotic cardiovascular disease.    Patient Care Team:  Sarwat Valenzuela MD as PCP - General (Internal Medicine)  Giorgi VIERA MD as Consulting Physician (Endocrinology)  Ameya Granda MD as Consulting Physician (Urology)  Giovana Mooney MD as Surgeon (General Surgery)    ____________________________________________________________________    ASSESSMENT & PLAN:    1. Asthmatic bronchitis, mild persistent, uncomplicated    2. Abdominal aortic atherosclerosis    3. Cigarette nicotine dependence with nicotine-induced disorder      No orders of the defined types were placed in this encounter.         Summary/Discussion:     -Counseled on smoking cessation and strongly advised her to quit. (15 minutes)  -Discussed treatment options including nicotine replacement therapy, Wellbutrin, and Chantix. Will proceed w/ bupropion  mg bid since this has helped in the past.   -Discussed risks/dangers of smoking including risk for various cancers, lung disease and cardiovascular disease.    -Advised " to watch for suicidal ideations, depression, increased anxiety/agitation, mood swings. Instructed to contact me if any problems arise.  -Handout given on 5 steps to prepare for smoking cessation.   · -F/U with smoking status on next visit in 2 months.      Return for Next scheduled follow up.    Future Appointments  Date Time Provider Department Center   7/31/2017 10:30 AM [facility] None   8/3/2017 7:00 AM [facility]   8/7/2017 11:15 AM Giorgi VIERA MD [facility] None   9/12/2017 11:00 AM Sarwat Valenzuela MD [facility] None     ____________________________________________________________________    REVIEW OF SYSTEMS    Review of Systems   Constitutional: Negative.    Respiratory: Positive for shortness of breath and wheezing.    Cardiovascular: Negative for chest pain.   Neurological: Negative.    Psychiatric/Behavioral: Negative for suicidal ideas. Dysphoric mood: stable depression.     Other: As noted per HPI      PHYSICAL EXAMINATION    Physical Exam   Cardiovascular: Normal rate and regular rhythm.    Pulmonary/Chest: Effort normal. No respiratory distress. She has wheezes.   Neurological: She is alert.   Psychiatric: She has a normal mood and affect. Judgment normal.         REVIEWED DATA:    Labs:       Imaging:        Medical Tests:        Summary of old records / correspondence / consultant report:        Request outside records:       Allergies   Allergen Reactions   • Codeine Nausea And Vomiting        Current Outpatient Prescriptions   Medication Sig Dispense Refill   • acebutolol (SECTRAL) 200 MG capsule Take 1 capsule by mouth Daily. 30 capsule 5   • ADVAIR DISKUS 500-50 MCG/DOSE DISKUS inhale 1 dose by mouth twice a day 60 each 5   • ALPRAZolam (XANAX) 1 MG tablet Take 1 tablet by mouth 2 (Two) Times a Day As Needed for Anxiety. 44 tablet 0   • atorvastatin (LIPITOR) 40 MG tablet Take 1 tablet by mouth Every Night. 30 tablet 3   •  butalbital-acetaminophen  MG tablet tablet Take 1 tablet by mouth twice a day if needed FOR TENSION/MIGRAINE HEADACHES 60 tablet 0   • cetirizine (ZYRTEC ALLERGY) 10 MG tablet Take 10 mg by mouth daily.     • Cholecalciferol (VITAMIN D3) 2000 UNITS tablet Take  by mouth daily.     • FLUoxetine (PROzac) 60 MG tablet take 1 tablet by mouth once daily 30 tablet 2   • fluticasone (FLONASE) 50 MCG/ACT nasal spray instill 2 sprays into each nostril every morning  1   • folic acid (FOLVITE) 1 MG tablet Take 1 tablet by mouth Daily. 30 tablet 5   • levothyroxine (SYNTHROID, LEVOTHROID) 125 MCG tablet 1/2 tablet daily 30 tablet 3   • montelukast (SINGULAIR) 10 MG tablet take 1 tablet by mouth once daily 30 tablet 5   • PROAIR  (90 BASE) MCG/ACT inhaler inhale 2 puffs by mouth every 6 hours if needed 8.5 inhaler 1   • buPROPion SR (WELLBUTRIN SR) 150 MG 12 hr tablet Take 1 tablet by mouth 2 (Two) Times a Day. 60 tablet 1     No current facility-administered medications for this visit.        PFSH:     The following portions of the patient's history were reviewed and updated as appropriate: Allergies / Current Medications / Past Medical History / Surgical History / Social History / Family History    Patient Active Problem List   Diagnosis   • Atopic rhinitis   • Migraine without aura and without status migrainosus, not intractable   • Common variable agammaglobulinemia   • Mixed anxiety depressive disorder   • Fibromyalgia   • Hyperlipidemia   • Multinodular goiter   • Osteoarthritis   • Asthmatic bronchitis   • Subclinical hypothyroidism   • Chronic mixed headache syndrome   • Impaired fasting glucose   • Palpitations   • Vertigo   • Abdominal aortic atherosclerosis       Past Medical History:   Diagnosis Date   • Allergic rhinitis    • Asthma    • CVID (common variable immunodeficiency)    • Deep vein thrombosis    • Depression    • Fibromyalgia    • Fibromyalgia, primary    • Hyperlipidemia    • Hypothyroidism     • Lupus     skin   • MVP (mitral valve prolapse)    • Osteopenia    • Osteoporosis    • Pneumonia    • Pulmonary embolism    • Sleep apnea     CPAP Machine #11       Past Surgical History:   Procedure Laterality Date   • CARDIAC CATHETERIZATION     • CHOLECYSTECTOMY     • COLONOSCOPY     • EPIDURAL BLOCK     • HYSTERECTOMY     • KNEE ARTHROSCOPY      calcium scraping   • NECK SURGERY      C 5 AND 6 TITANIUM PLATE   • NO PAST SURGERIES     • PARTIAL HYSTERECTOMY     • SKIN BIOPSY      benign   • SPINE SURGERY      titanium plate C5-6       Social History     Social History   • Marital status:      Spouse name: N/A   • Number of children: N/A   • Years of education: N/A     Occupational History   • Unemployed currently      Social History Main Topics   • Smoking status: Current Every Day Smoker     Packs/day: 1.00     Years: 30.00     Types: Cigarettes   • Smokeless tobacco: Never Used   • Alcohol use No   • Drug use: No   • Sexual activity: Yes     Partners: Male     Birth control/ protection: Post-menopausal, Other      Comment: Hysterectomy     Other Topics Concern   • None     Social History Narrative       Family History   Problem Relation Age of Onset   • Thyroid disease Mother      Hypothyroid   • No Known Problems Father      N/A   • Breast cancer Sister    • Cerebral aneurysm Sister    • Thyroid cancer Cousin    • Cerebral aneurysm Sister    • Arthritis Brother    • Cancer Sister      Breast   • Depression Sister    • Thyroid disease Sister      Hyperthyroid         **Dragon Disclaimer:   Much of this encounter note is an electronic transcription/translation of spoken language to printed text. The electronic translation of spoken language may permit erroneous, or at times, nonsensical words or phrases to be inadvertently transcribed. Although I have reviewed the note for such errors, some may still exist.

## 2017-07-21 NOTE — ASSESSMENT & PLAN NOTE
Start bupropion  mg bid.     Handout given on depression, antidepressant medications including information on side effects, adverse effects and discussed need to monitor for worsening depression, anxiety/irritability, suicidal thoughts/ideations, or significant mood swings. She expressed understanding and agreed to follow above instructions.

## 2017-07-31 DIAGNOSIS — E78.5 HYPERLIPIDEMIA, UNSPECIFIED HYPERLIPIDEMIA TYPE: ICD-10-CM

## 2017-07-31 RX ORDER — ATORVASTATIN CALCIUM 20 MG/1
TABLET, FILM COATED ORAL
Qty: 30 TABLET | Refills: 2 | Status: SHIPPED | OUTPATIENT
Start: 2017-07-31 | End: 2017-08-03 | Stop reason: SDUPTHER

## 2017-08-01 LAB
T3 SERPL-MCNC: 131.2 NG/DL (ref 80–200)
T4 FREE SERPL-MCNC: 0.87 NG/DL (ref 0.93–1.7)
TSH SERPL DL<=0.005 MIU/L-ACNC: 0.97 MIU/ML (ref 0.27–4.2)

## 2017-08-03 ENCOUNTER — HOSPITAL ENCOUNTER (OUTPATIENT)
Dept: GENERAL RADIOLOGY | Facility: HOSPITAL | Age: 62
Discharge: HOME | End: 2017-08-03
Admitting: ORTHOPAEDIC SURGERY

## 2017-08-03 ENCOUNTER — APPOINTMENT (OUTPATIENT)
Dept: PREADMISSION TESTING | Facility: HOSPITAL | Age: 62
End: 2017-08-03

## 2017-08-03 ENCOUNTER — HOSPITAL ENCOUNTER (OUTPATIENT)
Dept: GENERAL RADIOLOGY | Facility: HOSPITAL | Age: 62
Discharge: HOME | End: 2017-08-03

## 2017-08-03 VITALS
WEIGHT: 204 LBS | BODY MASS INDEX: 34.83 KG/M2 | OXYGEN SATURATION: 97 % | SYSTOLIC BLOOD PRESSURE: 128 MMHG | HEART RATE: 72 BPM | DIASTOLIC BLOOD PRESSURE: 66 MMHG | RESPIRATION RATE: 16 BRPM | TEMPERATURE: 99.3 F | HEIGHT: 64 IN

## 2017-08-03 LAB
ALBUMIN SERPL-MCNC: 4.3 G/DL (ref 3.5–5.2)
ALBUMIN/GLOB SERPL: 1.5 G/DL
ALP SERPL-CCNC: 173 U/L (ref 39–117)
ALT SERPL W P-5'-P-CCNC: 21 U/L (ref 1–33)
ANION GAP SERPL CALCULATED.3IONS-SCNC: 14.6 MMOL/L
APTT PPP: 35.3 SECONDS (ref 22.7–35.4)
AST SERPL-CCNC: 14 U/L (ref 1–32)
BASOPHILS # BLD AUTO: 0.02 10*3/MM3 (ref 0–0.2)
BASOPHILS NFR BLD AUTO: 0.4 % (ref 0–1.5)
BILIRUB SERPL-MCNC: 0.2 MG/DL (ref 0.1–1.2)
BILIRUB UR QL STRIP: NEGATIVE
BUN BLD-MCNC: 8 MG/DL (ref 8–23)
BUN/CREAT SERPL: 9.8 (ref 7–25)
CALCIUM SPEC-SCNC: 9.5 MG/DL (ref 8.6–10.5)
CHLORIDE SERPL-SCNC: 102 MMOL/L (ref 98–107)
CLARITY UR: CLEAR
CO2 SERPL-SCNC: 23.4 MMOL/L (ref 22–29)
COLOR UR: YELLOW
CREAT BLD-MCNC: 0.82 MG/DL (ref 0.57–1)
DEPRECATED RDW RBC AUTO: 45.2 FL (ref 37–54)
EOSINOPHIL # BLD AUTO: 0.05 10*3/MM3 (ref 0–0.7)
EOSINOPHIL NFR BLD AUTO: 1 % (ref 0.3–6.2)
ERYTHROCYTE [DISTWIDTH] IN BLOOD BY AUTOMATED COUNT: 13 % (ref 11.7–13)
GFR SERPL CREATININE-BSD FRML MDRD: 71 ML/MIN/1.73
GLOBULIN UR ELPH-MCNC: 2.8 GM/DL
GLUCOSE BLD-MCNC: 100 MG/DL (ref 65–99)
GLUCOSE UR STRIP-MCNC: NEGATIVE MG/DL
HCT VFR BLD AUTO: 41.6 % (ref 35.6–45.5)
HGB BLD-MCNC: 13.9 G/DL (ref 11.9–15.5)
HGB UR QL STRIP.AUTO: NEGATIVE
IMM GRANULOCYTES # BLD: 0 10*3/MM3 (ref 0–0.03)
IMM GRANULOCYTES NFR BLD: 0 % (ref 0–0.5)
INR PPP: 0.97 (ref 0.9–1.1)
KETONES UR QL STRIP: NEGATIVE
LEUKOCYTE ESTERASE UR QL STRIP.AUTO: NEGATIVE
LYMPHOCYTES # BLD AUTO: 1.74 10*3/MM3 (ref 0.9–4.8)
LYMPHOCYTES NFR BLD AUTO: 34.9 % (ref 19.6–45.3)
MCH RBC QN AUTO: 32.2 PG (ref 26.9–32)
MCHC RBC AUTO-ENTMCNC: 33.4 G/DL (ref 32.4–36.3)
MCV RBC AUTO: 96.3 FL (ref 80.5–98.2)
MONOCYTES # BLD AUTO: 0.59 10*3/MM3 (ref 0.2–1.2)
MONOCYTES NFR BLD AUTO: 11.8 % (ref 5–12)
NEUTROPHILS # BLD AUTO: 2.58 10*3/MM3 (ref 1.9–8.1)
NEUTROPHILS NFR BLD AUTO: 51.9 % (ref 42.7–76)
NITRITE UR QL STRIP: NEGATIVE
PH UR STRIP.AUTO: <=5 [PH] (ref 5–8)
PLATELET # BLD AUTO: 199 10*3/MM3 (ref 140–500)
PMV BLD AUTO: 9.8 FL (ref 6–12)
POTASSIUM BLD-SCNC: 3.9 MMOL/L (ref 3.5–5.2)
PROT SERPL-MCNC: 7.1 G/DL (ref 6–8.5)
PROT UR QL STRIP: NEGATIVE
PROTHROMBIN TIME: 12.5 SECONDS (ref 11.7–14.2)
RBC # BLD AUTO: 4.32 10*6/MM3 (ref 3.9–5.2)
SODIUM BLD-SCNC: 140 MMOL/L (ref 136–145)
SP GR UR STRIP: 1.01 (ref 1–1.03)
UROBILINOGEN UR QL STRIP: NORMAL
WBC NRBC COR # BLD: 4.98 10*3/MM3 (ref 4.5–10.7)

## 2017-08-03 PROCEDURE — 93005 ELECTROCARDIOGRAM TRACING: CPT

## 2017-08-03 PROCEDURE — 85610 PROTHROMBIN TIME: CPT | Performed by: ORTHOPAEDIC SURGERY

## 2017-08-03 PROCEDURE — 85730 THROMBOPLASTIN TIME PARTIAL: CPT | Performed by: ORTHOPAEDIC SURGERY

## 2017-08-03 PROCEDURE — 85025 COMPLETE CBC W/AUTO DIFF WBC: CPT | Performed by: ORTHOPAEDIC SURGERY

## 2017-08-03 PROCEDURE — 36415 COLL VENOUS BLD VENIPUNCTURE: CPT

## 2017-08-03 PROCEDURE — 93010 ELECTROCARDIOGRAM REPORT: CPT | Performed by: INTERNAL MEDICINE

## 2017-08-03 PROCEDURE — 71020 HC CHEST PA AND LATERAL: CPT

## 2017-08-03 PROCEDURE — 73560 X-RAY EXAM OF KNEE 1 OR 2: CPT

## 2017-08-03 PROCEDURE — 81003 URINALYSIS AUTO W/O SCOPE: CPT | Performed by: ORTHOPAEDIC SURGERY

## 2017-08-03 PROCEDURE — 80053 COMPREHEN METABOLIC PANEL: CPT | Performed by: ORTHOPAEDIC SURGERY

## 2017-08-03 RX ORDER — CEPHALEXIN 500 MG/1
500 CAPSULE ORAL DAILY
COMMUNITY
End: 2017-08-07

## 2017-08-03 RX ORDER — ALBUTEROL SULFATE 90 UG/1
2 AEROSOL, METERED RESPIRATORY (INHALATION) 2 TIMES DAILY
COMMUNITY
End: 2018-02-28 | Stop reason: SDUPTHER

## 2017-08-03 RX ORDER — ATORVASTATIN CALCIUM 20 MG/1
20 TABLET, FILM COATED ORAL NIGHTLY
COMMUNITY
End: 2018-01-31 | Stop reason: SDUPTHER

## 2017-08-03 RX ORDER — CHLORHEXIDINE GLUCONATE 500 MG/1
CLOTH TOPICAL
COMMUNITY
End: 2017-10-27 | Stop reason: ALTCHOICE

## 2017-08-03 RX ORDER — MONTELUKAST SODIUM 10 MG/1
10 TABLET ORAL NIGHTLY
COMMUNITY
End: 2018-10-02 | Stop reason: SDUPTHER

## 2017-08-03 NOTE — DISCHARGE INSTRUCTIONS
PLEASE ARRIVE AT 10:00 AM ON 8/17/2017      Take the following medications the morning of surgery with a small sip of water:  ADVAIR, PROAIR, SECTRAL        General Instructions:  • Do not eat solid food after midnight the night before surgery.  • You may drink clear liquids day of surgery but must stop at least one hour before your hospital arrival time.  • It is beneficial for you to have a clear drink that contains carbohydrates the day of surgery.  We suggest a 20 ounce bottle of Gatorade or Powerade for non-diabetic patients or a 20 ounce bottle of G2 or Powerade Zero for diabetic patients. (Pediatric patients, are not advised to drink a 20 ounce carbohydrate drink)    Clear liquids are liquids you can see through.  Nothing red in color.     Plain water                               Sports drinks  Sodas                                   Gelatin (Jell-O)  Fruit juices without pulp such as white grape juice and apple juice  Popsicles that contain no fruit or yogurt  Tea or coffee (no cream or milk added)  Gatorade / Powerade  G2 / Powerade Zero    • Infants may have breast milk up to four hours before surgery.  • Infants drinking formula may drink formula up to six hours before surgery.   • Patients who avoid smoking, chewing tobacco and alcohol for 4 weeks prior to surgery have a reduced risk of post-operative complications.  Quit smoking as many days before surgery as you can.  • Do not smoke, use chewing tobacco or drink alcohol the day of surgery.   • If applicable bring your C-PAP/ BI-PAP machine.  • Bring any papers given to you in the doctor’s office.  • Wear clean comfortable clothes and socks.  • Do not wear contact lenses or make-up.  Bring a case for your glasses.   • Bring crutches or walker if applicable.  • Leave all other valuables and jewelry at home.  • The Pre-Admission Testing nurse will instruct you to bring medications if unable to obtain an accurate list in Pre-Admission Testing.           Preventing a Surgical Site Infection:  • For 2 to 3 days before surgery, avoid shaving with a razor because the razor can irritate skin and make it easier to develop an infection.  • The night prior to surgery sleep in a clean bed with clean clothing.  Do not allow pets to sleep with you.  • Shower on the morning of surgery using a fresh bar of anti-bacterial soap (such as Dial) and clean washcloth.  Dry with a clean towel and dress in clean clothing.  • Ask your surgeon if you will be receiving antibiotics prior to surgery.  • Make sure you, your family, and all healthcare providers clean their hands with soap and water or an alcohol based hand  before caring for you or your wound.    Day of surgery:  Upon arrival, a Pre-op nurse and Anesthesiologist will review your health history, obtain vital signs, and answer questions you may have.  The only belongings needed at this time will be your home medications and if applicable your C-PAP/BI-PAP machine.  If you are staying overnight your family can leave the rest of your belongings in the car and bring them to your room later.  A Pre-op nurse will start an IV and you may receive medication in preparation for surgery, including something to help you relax.  Your family will be able to see you in the Pre-op area.  While you are in surgery your family should notify the waiting room  if they leave the waiting room area and provide a contact phone number.    Please be aware that surgery does come with discomfort.  We want to make every effort to control your discomfort so please discuss any uncontrolled symptoms with your nurse.   Your doctor will most likely have prescribed pain medications.      If you are going home after surgery you will receive individualized written care instructions before being discharged.  A responsible adult must drive you to and from the hospital on the day of your surgery and stay with you for 24 hours.    If you  are staying overnight following surgery, you will be transported to your hospital room following the recovery period.  [facility] has all private rooms.    If you have any questions please call Pre-Admission Testing at [phone].  Deductibles and co-payments are collected on the day of service. Please be prepared to pay the required co-pay, deductible or deposit on the day of service as defined by your plan.    2% CHLORAHEXIDINE GLUCONATE* CLOTH  Preparing or “prepping” skin before surgery can reduce the risk of infection at the surgical site. To make the process easier, [facility] has chosen disposable cloths moistened with a rinse-free, 2% Chlorhexidine Gluconate (CHG) antiseptic solution. The steps below outline the prepping process and should be carefully followed.        Use the prep cloth on the area that is circled in the diagram             Directions Night before Surgery  1) Shower using a fresh bar of anti-bacterial soap (such as Dial) and clean washcloth.  Use a clean towel to completely dry your skin.  2) Do not use any lotions, oils or creams on your skin.  3) Open the package and remove 1 cloth, wipe your skin for 30 seconds in a circular motion.  Allow to dry for 3 minutes.  4) Repeat #3 with second cloth.  5) Do not touch your eyes, ears, or mouth with the prep cloth.  6) Allow the wet prep solution to air dry.  7) Discard the prep cloth and wash your hands with soap and water.   8) Dress in clean bed clothes and sleep on fresh clean bed sheets.   9) You may experience some temporary itching after the prep.    Directions Day of Surgery  1) Repeat steps 1,2,3,4,5,6,7, and 9.   2) Dress in clean clothes before coming to the hospital.    BACTROBAN NASAL OINTMENT  There are many germs normally in your nose. Bactroban is an ointment that will help reduce these germs. Please follow these instructions for Bactroban use:      ____The day before surgery in the  morning  Date________    ____The day before surgery in the evening              Date________    ____The day of surgery in the morning    Date________    **Squirt ½ package of Bactroban Ointment onto a cotton applicator and apply to inside of 1st nostril.  Squirt the remaining Bactroban and apply to the inside of the other nostril.

## 2017-08-04 DIAGNOSIS — F41.8 MIXED ANXIETY AND DEPRESSIVE DISORDER: ICD-10-CM

## 2017-08-04 RX ORDER — ALPRAZOLAM 1 MG/1
1 TABLET ORAL 2 TIMES DAILY PRN
Qty: 44 TABLET | Refills: 0 | OUTPATIENT
Start: 2017-08-04 | End: 2017-09-06 | Stop reason: SDUPTHER

## 2017-08-04 NOTE — TELEPHONE ENCOUNTER
Pt said [facility] have sent requests for her Alprazolam but hasn't heard back. Pt needs refill.  Pt's # [phone]  [facility]  Thanks SP

## 2017-08-07 ENCOUNTER — OFFICE VISIT (OUTPATIENT)
Dept: ENDOCRINOLOGY | Age: 62
End: 2017-08-07

## 2017-08-07 VITALS
HEART RATE: 74 BPM | WEIGHT: 204 LBS | HEIGHT: 64 IN | SYSTOLIC BLOOD PRESSURE: 128 MMHG | BODY MASS INDEX: 34.83 KG/M2 | OXYGEN SATURATION: 95 % | DIASTOLIC BLOOD PRESSURE: 82 MMHG

## 2017-08-07 DIAGNOSIS — E03.8 SUBCLINICAL HYPOTHYROIDISM: Primary | Chronic | ICD-10-CM

## 2017-08-07 DIAGNOSIS — E04.2 MULTINODULAR GOITER: Chronic | ICD-10-CM

## 2017-08-07 DIAGNOSIS — R63.5 ABNORMAL WEIGHT GAIN: ICD-10-CM

## 2017-08-07 PROCEDURE — 99214 OFFICE O/P EST MOD 30 MIN: CPT | Performed by: INTERNAL MEDICINE

## 2017-08-07 NOTE — PROGRESS NOTES
62 y.o.    Patient Care Team:  Sarwat Valenzuela MD as PCP - General (Internal Medicine)  Giorgi VIERA MD as Consulting Physician (Endocrinology)  Ameya Granda MD as Consulting Physician (Urology)  Giovana Mooney MD as Surgeon (General Surgery)    Chief Complaint:      F/U HYPOTHYROIDISM.  MULTINODULAR GOITER.  HERE TO DISCUSS LAB REUSLTS.  Subjective     HPI     patient is a 62-year-old white female with a past history of multinodular goiter and hypothyroidism came for follow-up  Multinodular goiter  Patient underwent previously fine-needle aspiration biopsy and it was benign  Patient had a follow-up ultrasound in July 2017 and both the nodules are subcentimeter and stable  Hypothyroidism  Patient was started on 50 µg levothyroxine.  But apparently in the past 6 weeks the dosage was adjusted to 62.5 by the primary care physician  Patient denied any symptoms at that time.  She also denies any symptoms currently  Abnormal weight gain  Patient continues to weigh 204 pounds with a BMI of 35.  Patient reported that she is due to receive total knee replacement on the left side in the next 10 days  Patient is very anxious about it and may not go ahead with this  Patient reported that her sister might have hyperparathyroidism and hypercalcemia            Interval History October 28, 2016      Summary  Patient reported that she started gaining weight suddenly since February 2016 and gained nearly 30 pounds. She was initially on levothyroxine himand since then the weight has stabilized and her fatigue is improved only slightly.      Fatigue and has been a significant problem for the past 6 months  Patient denied any dysphagia   She has history of forces her voice for the past several months.  She had a thyroid ultrasound done which revealed multiple nodules. Patient is also a smoker      Patient's first cousin had thyroid cancer approximately of her age  Patient also reported she had noncontrast studies in  January 2016 including MRI      The patient reported that she is related to Dr. Giovana Mooney, and she was advised to seek endocrinology opinion.      Patient reports weight gain, fatigue, horses a voice, concern for thyroid cancer since she has family history are. the primary issues      The following portions of the patient's history were reviewed and updated as appropriate: allergies, current medications, past family history, past medical history, past social history, past surgical history and problem list.    Past Medical History:   Diagnosis Date   • Anxiety and depression    • Aortic sclerosis    • Asthma    • CVID (common variable immunodeficiency)    • Deep vein thrombosis     BEHIND LEFT KNEE   • Fibromyalgia    • Fibromyalgia, primary    • History of pneumonia    • Hyperlipidemia    • Hypothyroidism    • Lupus     skin   • Migraines    • MVP (mitral valve prolapse)    • Osteoarthritis    • Osteopenia    • Osteoporosis    • PONV (postoperative nausea and vomiting)    • Pulmonary embolism    • Recurrent UTI    • Sleep apnea     CPAP Machine #11     Family History   Problem Relation Age of Onset   • Thyroid disease Mother      Hypothyroid   • No Known Problems Father      N/A   • Breast cancer Sister    • Cerebral aneurysm Sister    • Thyroid cancer Cousin    • Cerebral aneurysm Sister    • Arthritis Brother    • Cancer Sister      Breast   • Depression Sister    • Thyroid disease Sister      Hyperthyroid   • Malig Hyperthermia Neg Hx      Social History     Social History   • Marital status:      Spouse name: N/A   • Number of children: N/A   • Years of education: N/A     Occupational History   • Unemployed currently      Social History Main Topics   • Smoking status: Current Every Day Smoker     Packs/day: 1.00     Years: 30.00     Types: Cigarettes   • Smokeless tobacco: Never Used   • Alcohol use No   • Drug use: No   • Sexual activity: Yes     Partners: Male     Birth control/ protection:  Post-menopausal, Other      Comment: Hysterectomy     Other Topics Concern   • Not on file     Social History Narrative     Allergies   Allergen Reactions   • Codeine Nausea And Vomiting   • Zofran [Ondansetron Hcl] Other (See Comments)     CONSTIPATION       Current Outpatient Prescriptions:   •  acebutolol (SECTRAL) 200 MG capsule, Take 1 capsule by mouth Daily. (Patient taking differently: Take 200 mg by mouth Every Morning.), Disp: 30 capsule, Rfl: 5  •  albuterol (PROVENTIL HFA;VENTOLIN HFA) 108 (90 BASE) MCG/ACT inhaler, Inhale 2 puffs 2 (Two) Times a Day., Disp: , Rfl:   •  ALPRAZolam (XANAX) 1 MG tablet, Take 1 tablet by mouth 2 (Two) Times a Day As Needed for Anxiety., Disp: 44 tablet, Rfl: 0  •  atorvastatin (LIPITOR) 20 MG tablet, Take 20 mg by mouth Every Night., Disp: , Rfl:   •  buPROPion SR (WELLBUTRIN SR) 150 MG 12 hr tablet, Take 1 tablet by mouth 2 (Two) Times a Day. (Patient taking differently: Take 150 mg by mouth Daily.), Disp: 60 tablet, Rfl: 1  •  butalbital-acetaminophen  MG tablet tablet, Take 1 tablet by mouth twice a day if needed FOR TENSION/MIGRAINE HEADACHES (Patient taking differently: 1 tablet. Take 1 tablet by mouth twice a day if needed FOR TENSION/MIGRAINE HEADACHES), Disp: 60 tablet, Rfl: 0  •  cephalexin (KEFLEX) 500 MG capsule, Take 500 mg by mouth Daily., Disp: , Rfl:   •  cetirizine (ZYRTEC ALLERGY) 10 MG tablet, Take 10 mg by mouth daily., Disp: , Rfl:   •  Chlorhexidine Gluconate Cloth 2 % pads, Apply  topically. USE AS DIRECTED FOR PRE OP, Disp: , Rfl:   •  Cholecalciferol (VITAMIN D3) 2000 UNITS tablet, Take 2,000 Units by mouth Daily., Disp: , Rfl:   •  FLUoxetine HCl (PROZAC PO), Take 60 mg by mouth Daily., Disp: , Rfl:   •  fluticasone-salmeterol (ADVAIR) 500-50 MCG/DOSE DISKUS, Inhale 1 puff 2 (Two) Times a Day., Disp: , Rfl:   •  folic acid (FOLVITE) 1 MG tablet, Take 1 tablet by mouth Daily., Disp: 30 tablet, Rfl: 5  •  levothyroxine (SYNTHROID, LEVOTHROID) 125  "MCG tablet, 1/2 tablet daily (Patient taking differently: Take 65 mcg by mouth Daily. 1/2 tablet daily), Disp: 30 tablet, Rfl: 3  •  montelukast (SINGULAIR) 10 MG tablet, Take 10 mg by mouth Every Night., Disp: , Rfl:   •  mupirocin (BACTROBAN) 2 % ointment, Apply  topically. USE AS DIRECTED FOR PRE OP, Disp: , Rfl:         Review of Systems   Constitutional: Negative for chills, fatigue and fever.   Cardiovascular: Negative for chest pain and palpitations.   Gastrointestinal: Negative for abdominal pain, constipation, diarrhea, nausea and vomiting.   Endocrine: Negative for cold intolerance and heat intolerance.   All other systems reviewed and are negative.      Objective       Vitals:    08/07/17 1140   BP: 128/82   Pulse: 74   SpO2: 95%   Weight: 204 lb (92.5 kg)   Height: 64\" (162.6 cm)     Body mass index is 35.02 kg/(m^2).      Physical Exam   Constitutional: She is oriented to person, place, and time. She appears well-developed and well-nourished.   HENT:   Head: Normocephalic and atraumatic.   Eyes: EOM are normal. Pupils are equal, round, and reactive to light.   Neck: Normal range of motion. Neck supple. Thyromegaly (nontender examination,moving well on swallowing) present.   Cardiovascular: Normal rate, regular rhythm, normal heart sounds and intact distal pulses.    Pulmonary/Chest: Effort normal and breath sounds normal.   Abdominal: Soft. Bowel sounds are normal. She exhibits no distension. There is no tenderness.   Musculoskeletal: Normal range of motion. She exhibits no edema.   Neurological: She is alert and oriented to person, place, and time. She has normal reflexes.   Skin: Skin is warm and dry. No rash noted.   Psychiatric: She has a normal mood and affect. Her behavior is normal.   Vitals reviewed.    Results Review:     I reviewed the patient's new clinical results.    Medical records reviewed  Summary:      Appointment on 08/03/2017   Component Date Value Ref Range Status   • Glucose " 08/03/2017 100* 65 - 99 mg/dL Final   • BUN 08/03/2017 8  8 - 23 mg/dL Final   • Creatinine 08/03/2017 0.82  0.57 - 1.00 mg/dL Final   • Sodium 08/03/2017 140  136 - 145 mmol/L Final   • Potassium 08/03/2017 3.9  3.5 - 5.2 mmol/L Final   • Chloride 08/03/2017 102  98 - 107 mmol/L Final   • CO2 08/03/2017 23.4  22.0 - 29.0 mmol/L Final   • Calcium 08/03/2017 9.5  8.6 - 10.5 mg/dL Final   • Total Protein 08/03/2017 7.1  6.0 - 8.5 g/dL Final   • Albumin 08/03/2017 4.30  3.50 - 5.20 g/dL Final   • ALT (SGPT) 08/03/2017 21  1 - 33 U/L Final   • AST (SGOT) 08/03/2017 14  1 - 32 U/L Final   • Alkaline Phosphatase 08/03/2017 173* 39 - 117 U/L Final   • Total Bilirubin 08/03/2017 0.2  0.1 - 1.2 mg/dL Final   • eGFR Non  Amer 08/03/2017 71  >60 mL/min/1.73 Final   • Globulin 08/03/2017 2.8  gm/dL Final   • A/G Ratio 08/03/2017 1.5  g/dL Final   • BUN/Creatinine Ratio 08/03/2017 9.8  7.0 - 25.0 Final   • Anion Gap 08/03/2017 14.6  mmol/L Final   • PTT 08/03/2017 35.3  22.7 - 35.4 seconds Final   • Protime 08/03/2017 12.5  11.7 - 14.2 Seconds Final   • INR 08/03/2017 0.97  0.90 - 1.10 Final   • Color, UA 08/03/2017 Yellow  Yellow, Straw Final   • Appearance, UA 08/03/2017 Clear  Clear Final   • pH, UA 08/03/2017 <=5.0  5.0 - 8.0 Final   • Specific Gravity, UA 08/03/2017 1.013  1.005 - 1.030 Final   • Glucose, UA 08/03/2017 Negative  Negative Final   • Ketones, UA 08/03/2017 Negative  Negative Final   • Bilirubin, UA 08/03/2017 Negative  Negative Final   • Blood, UA 08/03/2017 Negative  Negative Final   • Protein, UA 08/03/2017 Negative  Negative Final   • Leuk Esterase, UA 08/03/2017 Negative  Negative Final   • Nitrite, UA 08/03/2017 Negative  Negative Final   • Urobilinogen, UA 08/03/2017 0.2 E.U./dL  0.2 - 1.0 E.U./dL Final   • WBC 08/03/2017 4.98  4.50 - 10.70 10*3/mm3 Final   • RBC 08/03/2017 4.32  3.90 - 5.20 10*6/mm3 Final   • Hemoglobin 08/03/2017 13.9  11.9 - 15.5 g/dL Final   • Hematocrit 08/03/2017 41.6  35.6  - 45.5 % Final   • MCV 08/03/2017 96.3  80.5 - 98.2 fL Final   • MCH 08/03/2017 32.2* 26.9 - 32.0 pg Final   • MCHC 08/03/2017 33.4  32.4 - 36.3 g/dL Final   • RDW 08/03/2017 13.0  11.7 - 13.0 % Final   • RDW-SD 08/03/2017 45.2  37.0 - 54.0 fl Final   • MPV 08/03/2017 9.8  6.0 - 12.0 fL Final   • Platelets 08/03/2017 199  140 - 500 10*3/mm3 Final   • Neutrophil % 08/03/2017 51.9  42.7 - 76.0 % Final   • Lymphocyte % 08/03/2017 34.9  19.6 - 45.3 % Final   • Monocyte % 08/03/2017 11.8  5.0 - 12.0 % Final   • Eosinophil % 08/03/2017 1.0  0.3 - 6.2 % Final   • Basophil % 08/03/2017 0.4  0.0 - 1.5 % Final   • Immature Grans % 08/03/2017 0.0  0.0 - 0.5 % Final   • Neutrophils, Absolute 08/03/2017 2.58  1.90 - 8.10 10*3/mm3 Final   • Lymphocytes, Absolute 08/03/2017 1.74  0.90 - 4.80 10*3/mm3 Final   • Monocytes, Absolute 08/03/2017 0.59  0.20 - 1.20 10*3/mm3 Final   • Eosinophils, Absolute 08/03/2017 0.05  0.00 - 0.70 10*3/mm3 Final   • Basophils, Absolute 08/03/2017 0.02  0.00 - 0.20 10*3/mm3 Final   • Immature Grans, Absolute 08/03/2017 0.00  0.00 - 0.03 10*3/mm3 Final     Lab Results   Component Value Date    HGBA1C 5.3 02/23/2016    HGBA1C 5.4 04/09/2015     Lab Results   Component Value Date    CREATININE 0.82 08/03/2017     Imaging Results (most recent)     None                Assessment and Plan:    Kaci was seen today for hypothyroidism and goiter.    Diagnoses and all orders for this visit:    Subclinical hypothyroidism  -     T4, Free; Future  -     TSH; Future    Multinodular goiter  -     T4, Free; Future  -     TSH; Future    Abnormal weight gain        Thyroid ultrasound report from June 2017 reviewed with the patient  Small subcentimeter nodules noted  The nodule on the right side actually diminished from 0.8 cm down to 0.7 cm  The nodule on the left stayed at 0.9 cm when compared to the previous ultrasound from December 2016  Patient's TSH was 0.9 and free T4 was 0.8  Apparently her dose was adjusted by  "primary care she is currently taking 62.5 µg    Patient denied any significant symptoms  Patient will continue the current dose  She will get lab testing done in 6 weeks  Patient will return to follow-up in 3-4 months     The total time spent for old record and lab review and face- to- face was more than 25 min of which greater than 15 min of time was spent on counseling the patient on recommended evaluation and treatment options, instructions for management/treatment and /or follow up  and importance of compliance with chosen management or treatment options   Giorgi Mcclellan MD. FACE    08/07/17      EMR Dragon / transcription disclaimer:     \"Dictated utilizing Dragon dictation\".         "

## 2017-08-09 ENCOUNTER — HOSPITAL ENCOUNTER (OUTPATIENT)
Dept: SLEEP MEDICINE | Facility: HOSPITAL | Age: 62
Discharge: HOME | End: 2017-08-09
Admitting: INTERNAL MEDICINE

## 2017-08-09 ENCOUNTER — TELEPHONE (OUTPATIENT)
Dept: SLEEP MEDICINE | Facility: HOSPITAL | Age: 62
End: 2017-08-09

## 2017-08-09 PROCEDURE — G0463 HOSPITAL OUTPT CLINIC VISIT: HCPCS

## 2017-08-09 PROCEDURE — 99214 OFFICE O/P EST MOD 30 MIN: CPT | Performed by: INTERNAL MEDICINE

## 2017-08-11 DIAGNOSIS — G43.009 MIGRAINE WITHOUT AURA AND WITHOUT STATUS MIGRAINOSUS, NOT INTRACTABLE: Chronic | ICD-10-CM

## 2017-08-11 DIAGNOSIS — G44.89 CHRONIC MIXED HEADACHE SYNDROME: Chronic | ICD-10-CM

## 2017-08-11 RX ORDER — BUTALBITAL/ACETAMINOPHEN 50MG-325MG
1 TABLET ORAL 2 TIMES DAILY PRN
Qty: 60 TABLET | Refills: 0 | OUTPATIENT
Start: 2017-08-11 | End: 2017-09-12 | Stop reason: SDUPTHER

## 2017-08-12 PROBLEM — G47.33 OSA ON CPAP: Status: ACTIVE | Noted: 2017-08-12

## 2017-08-12 PROBLEM — Z99.89 OSA ON CPAP: Status: ACTIVE | Noted: 2017-08-12

## 2017-08-12 NOTE — PROGRESS NOTES
[facility] Follow up Note        Patient Care Team:  Sarwat Valenzuela MD as PCP - General (Internal Medicine)  Giorgi VIERA MD as Consulting Physician (Endocrinology)  Ameya Granda MD as Consulting Physician (Urology)  Giovana Mooney MD as Surgeon (General Surgery)  Hector Trujillo MD as Consulting Physician (Sleep Medicine)    Chief complaint:  DARRION; CPAP stopped working.    History of present illness:  The patient is a 62 y.o. female who I last saw in April 2014.  She has been using CPAP +11.  She has gained weight.  Occasionally she will take a nap.  She goes to bed at 10 PM and awakens at 7 AM.  She feels great upon arising.  She has been benefiting from CPAP therapy.    Review of Systems:  Recorded on the Sleep Questionnaire.  Unremarkable except for painful joints and anxiety and depression.    History:  Past Medical History:   Diagnosis Date   • Anxiety and depression    • Aortic sclerosis    • Asthma    • CVID (common variable immunodeficiency)    • Deep vein thrombosis     BEHIND LEFT KNEE   • Fibromyalgia    • Fibromyalgia, primary    • History of pneumonia    • Hyperlipidemia    • Hypothyroidism    • Lupus     skin   • Migraines    • MVP (mitral valve prolapse)    • Osteoarthritis    • Osteopenia    • Osteoporosis    • PONV (postoperative nausea and vomiting)    • Pulmonary embolism    • Recurrent UTI    • Sleep apnea     CPAP Machine #11   ,   Past Surgical History:   Procedure Laterality Date   • CARDIAC CATHETERIZATION     • CHOLECYSTECTOMY     • COLONOSCOPY     • EPIDURAL BLOCK     • HYSTERECTOMY     • KNEE ARTHROSCOPY      calcium scraping   • NECK SURGERY      C 5 AND 6 TITANIUM PLATE   • NO PAST SURGERIES     • PARTIAL HYSTERECTOMY     • SKIN BIOPSY      benign   ,   Family History   Problem Relation Age of Onset   • Thyroid disease Mother      Hypothyroid   • No Known Problems Father      N/A   • Breast cancer Sister    • Cerebral aneurysm Sister    • Thyroid cancer  Cousin    • Cerebral aneurysm Sister    • Arthritis Brother    • Cancer Sister      Breast   • Depression Sister    • Thyroid disease Sister      Hyperthyroid   • Malig Hyperthermia Neg Hx     and   Social History   Substance Use Topics   • Smoking status: Current Every Day Smoker     Packs/day: 1.00     Years: 30.00     Types: Cigarettes   • Smokeless tobacco: Never Used   • Alcohol use No       Social History:  She will have 3 caffeinated beverages a day.    Allergies:  Reviewed.     Medication Review: Her list was reviewed.    Vital Signs:  Height 64 inches and weight 202 and she is obese with a body mass index of 34.  /62 and heart rate 69.      Physical Exam:  Recorded on the [facility] Physical Exam Form and is unremarkable except for:  She has a large tongue and large uvula.  Uvula slightly deformed and deviated to the left.  Patent posterior pharyngeal opening.  Class II-III MP airway.      Results Review:  DME is [facility].  Downloads between February 10 and August 8, 2017 compliances 100% and average usage is 9 hours and 12 minutes and average AHI is normal without leak using CPAP +11.       Impression:   Obstructive sleep apnea adequately treated with CPAP +11 with good compliance and usage and no complaints of hypersomnolence.  However, her CPAP no longer functions.    Plan:  Good sleep hygiene measures should be maintained and weight loss would be beneficial.  I reviewed all with the patient.  A new order will be sent to her DME for all needed supplies and for a new auto CPAP between 10 and 15 cm water pressure.  I will see the patient back in 3 months.    Thank you for allowing me to assist in this patient's care.    Hector Trujillo MD  08/12/17  1:06 PM

## 2017-08-14 ENCOUNTER — TELEPHONE (OUTPATIENT)
Dept: INTERNAL MEDICINE | Age: 62
End: 2017-08-14

## 2017-08-14 ENCOUNTER — OFFICE VISIT (OUTPATIENT)
Dept: INTERNAL MEDICINE | Age: 62
End: 2017-08-14

## 2017-08-14 ENCOUNTER — HOSPITAL ENCOUNTER (OUTPATIENT)
Dept: INFUSION THERAPY | Facility: HOSPITAL | Age: 62
Discharge: HOME | End: 2017-08-14
Admitting: NURSE PRACTITIONER

## 2017-08-14 VITALS
RESPIRATION RATE: 20 BRPM | TEMPERATURE: 98.3 F | OXYGEN SATURATION: 97 % | SYSTOLIC BLOOD PRESSURE: 119 MMHG | DIASTOLIC BLOOD PRESSURE: 59 MMHG | HEART RATE: 67 BPM

## 2017-08-14 VITALS
WEIGHT: 195.6 LBS | SYSTOLIC BLOOD PRESSURE: 122 MMHG | TEMPERATURE: 97.6 F | BODY MASS INDEX: 33.39 KG/M2 | OXYGEN SATURATION: 98 % | HEIGHT: 64 IN | HEART RATE: 92 BPM | DIASTOLIC BLOOD PRESSURE: 68 MMHG

## 2017-08-14 DIAGNOSIS — R31.9 HEMATURIA: ICD-10-CM

## 2017-08-14 DIAGNOSIS — R11.10 VOMITING AND DIARRHEA: ICD-10-CM

## 2017-08-14 DIAGNOSIS — E86.0 MILD DEHYDRATION: ICD-10-CM

## 2017-08-14 DIAGNOSIS — R82.4 KETONURIA: ICD-10-CM

## 2017-08-14 DIAGNOSIS — E86.0 MILD DEHYDRATION: Primary | ICD-10-CM

## 2017-08-14 DIAGNOSIS — R19.7 VOMITING AND DIARRHEA: ICD-10-CM

## 2017-08-14 LAB
ANION GAP SERPL CALCULATED.3IONS-SCNC: 14.4 MMOL/L
BASOPHILS # BLD AUTO: 0.01 10*3/MM3 (ref 0–0.2)
BASOPHILS NFR BLD AUTO: 0.2 % (ref 0–1.5)
BILIRUB BLD-MCNC: ABNORMAL MG/DL
BUN BLD-MCNC: 12 MG/DL (ref 8–23)
BUN/CREAT SERPL: 15.2 (ref 7–25)
CALCIUM SPEC-SCNC: 9.5 MG/DL (ref 8.6–10.5)
CHLORIDE SERPL-SCNC: 101 MMOL/L (ref 98–107)
CLARITY, POC: CLEAR
CO2 SERPL-SCNC: 23.6 MMOL/L (ref 22–29)
COLOR UR: ABNORMAL
CREAT BLD-MCNC: 0.79 MG/DL (ref 0.57–1)
DEPRECATED RDW RBC AUTO: 45.4 FL (ref 37–54)
EOSINOPHIL # BLD AUTO: 0.01 10*3/MM3 (ref 0–0.7)
EOSINOPHIL NFR BLD AUTO: 0.2 % (ref 0.3–6.2)
ERYTHROCYTE [DISTWIDTH] IN BLOOD BY AUTOMATED COUNT: 13.1 % (ref 11.7–13)
GFR SERPL CREATININE-BSD FRML MDRD: 74 ML/MIN/1.73
GLUCOSE BLD-MCNC: 115 MG/DL (ref 65–99)
GLUCOSE UR STRIP-MCNC: NEGATIVE MG/DL
HCT VFR BLD AUTO: 42.1 % (ref 35.6–45.5)
HGB BLD-MCNC: 14.1 G/DL (ref 11.9–15.5)
IMM GRANULOCYTES # BLD: 0.03 10*3/MM3 (ref 0–0.03)
IMM GRANULOCYTES NFR BLD: 0.6 % (ref 0–0.5)
KETONES UR QL: ABNORMAL
LEUKOCYTE EST, POC: ABNORMAL
LYMPHOCYTES # BLD AUTO: 1.31 10*3/MM3 (ref 0.9–4.8)
LYMPHOCYTES NFR BLD AUTO: 27 % (ref 19.6–45.3)
MCH RBC QN AUTO: 32 PG (ref 26.9–32)
MCHC RBC AUTO-ENTMCNC: 33.5 G/DL (ref 32.4–36.3)
MCV RBC AUTO: 95.5 FL (ref 80.5–98.2)
MONOCYTES # BLD AUTO: 0.67 10*3/MM3 (ref 0.2–1.2)
MONOCYTES NFR BLD AUTO: 13.8 % (ref 5–12)
NEUTROPHILS # BLD AUTO: 2.82 10*3/MM3 (ref 1.9–8.1)
NEUTROPHILS NFR BLD AUTO: 58.2 % (ref 42.7–76)
NITRITE UR-MCNC: NEGATIVE MG/ML
NRBC BLD MANUAL-RTO: 0 /100 WBC (ref 0–0)
PH UR: 6 [PH] (ref 5–8)
PLATELET # BLD AUTO: 182 10*3/MM3 (ref 140–500)
PMV BLD AUTO: 9.7 FL (ref 6–12)
POTASSIUM BLD-SCNC: 3.2 MMOL/L (ref 3.5–5.2)
PROT UR STRIP-MCNC: ABNORMAL MG/DL
RBC # BLD AUTO: 4.41 10*6/MM3 (ref 3.9–5.2)
RBC # UR STRIP: ABNORMAL /UL
SODIUM BLD-SCNC: 139 MMOL/L (ref 136–145)
SP GR UR: 1.02 (ref 1–1.03)
UROBILINOGEN UR QL: NORMAL
WBC NRBC COR # BLD: 4.85 10*3/MM3 (ref 4.5–10.7)

## 2017-08-14 PROCEDURE — 85025 COMPLETE CBC W/AUTO DIFF WBC: CPT | Performed by: NURSE PRACTITIONER

## 2017-08-14 PROCEDURE — 36415 COLL VENOUS BLD VENIPUNCTURE: CPT

## 2017-08-14 PROCEDURE — 80048 BASIC METABOLIC PNL TOTAL CA: CPT | Performed by: NURSE PRACTITIONER

## 2017-08-14 PROCEDURE — 96360 HYDRATION IV INFUSION INIT: CPT

## 2017-08-14 PROCEDURE — 99214 OFFICE O/P EST MOD 30 MIN: CPT | Performed by: NURSE PRACTITIONER

## 2017-08-14 PROCEDURE — 81003 URINALYSIS AUTO W/O SCOPE: CPT | Performed by: NURSE PRACTITIONER

## 2017-08-14 RX ORDER — SODIUM CHLORIDE 9 MG/ML
999 INJECTION, SOLUTION INTRAVENOUS CONTINUOUS
Status: DISCONTINUED | OUTPATIENT
Start: 2017-08-14 | End: 2017-08-16 | Stop reason: HOSPADM

## 2017-08-14 RX ADMIN — SODIUM CHLORIDE 999 ML/HR: 9 INJECTION, SOLUTION INTRAVENOUS at 17:10

## 2017-08-14 NOTE — PROGRESS NOTES
Pt from 's office for IV fluids and lab draw.  Pt had n/v yesterday.  Pt tolerated infusion well and states she felt better after the IV fluids. Pt dc'ed ambulatory with cane at 1814 with  .

## 2017-08-14 NOTE — PATIENT INSTRUCTIONS
Dehydration, Adult  Dehydration is a condition in which you do not have enough fluid or water in your body. It happens when you take in less fluid than you lose. Vital organs such as the kidneys, brain, and heart cannot function without a proper amount of fluids. Any loss of fluids from the body can cause dehydration.   Dehydration can range from mild to severe. This condition should be treated right away to help prevent it from becoming severe.  CAUSES   This condition may be caused by:  · Vomiting.  · Diarrhea.  · Excessive sweating, such as when exercising in hot or humid weather.  · Not drinking enough fluid during strenuous exercise or during an illness.  · Excessive urine output.  · Fever.  · Certain medicines.  RISK FACTORS  This condition is more likely to develop in:  · People who are taking certain medicines that cause the body to lose excess fluid (diuretics).    · People who have a chronic illness, such as diabetes, that may increase urination.  · Older adults.    · People who live at high altitudes.    · People who participate in endurance sports.    SYMPTOMS   Mild Dehydration  · Thirst.  · Dry lips.  · Slightly dry mouth.  · Dry, warm skin.  Moderate Dehydration  · Very dry mouth.    · Muscle cramps.    · Dark urine and decreased urine production.    · Decreased tear production.    · Headache.    · Light-headedness, especially when you stand up from a sitting position.    Severe Dehydration  · Changes in skin.      Cold and clammy skin.      Skin does not spring back quickly when lightly pinched and released.    · Changes in body fluids.      Extreme thirst.      No tears.      Not able to sweat when body temperature is high, such as in hot weather.      Minimal urine production.    · Changes in vital signs.      Rapid, weak pulse (more than 100 beats per minute when you are sitting still).      Rapid breathing.      Low blood pressure.    · Other changes.      Sunken eyes.      Cold hands and feet.       Confusion.    Lethargy and difficulty being awakened.    Fainting (syncope).      Short-term weight loss.      Unconsciousness.  DIAGNOSIS   This condition may be diagnosed based on your symptoms. You may also have tests to determine how severe your dehydration is. These tests may include:   · Urine tests.    · Blood tests.    TREATMENT   Treatment for this condition depends on the severity. Mild or moderate dehydration can often be treated at home. Treatment should be started right away. Do not wait until dehydration becomes severe. Severe dehydration needs to be treated at the hospital.  Treatment for Mild Dehydration  · Drinking plenty of water to replace the fluid you have lost.    · Replacing minerals in your blood (electrolytes) that you may have lost.    Treatment for Moderate Dehydration   · Consuming oral rehydration solution (ORS).  Treatment for Severe Dehydration  · Receiving fluid through an IV tube.    · Receiving electrolyte solution through a feeding tube that is passed through your nose and into your stomach (nasogastric tube or NG tube).  · Correcting any abnormalities in electrolytes.  HOME CARE INSTRUCTIONS   · Drink enough fluid to keep your urine clear or pale yellow.    · Drink water or fluid slowly by taking small sips. You can also try sucking on ice cubes.   · Have food or beverages that contain electrolytes. Examples include bananas and sports drinks.  · Take over-the-counter and prescription medicines only as told by your health care provider.    · Prepare ORS according to the 's instructions. Take sips of ORS every 5 minutes until your urine returns to normal.  · If you have vomiting or diarrhea, continue to try to drink water, ORS, or both.    · If you have diarrhea, avoid:      Beverages that contain caffeine.      Fruit juice.      Milk.       Carbonated soft drinks.  · Do not take salt tablets. This can lead to the condition of having too much sodium in your body  (hypernatremia).    SEEK MEDICAL CARE IF:  · You cannot eat or drink without vomiting.  · You have had moderate diarrhea during a period of more than 24 hours.  · You have a fever.  SEEK IMMEDIATE MEDICAL CARE IF:   · You have extreme thirst.  · You have severe diarrhea.  · You have not urinated in 6-8 hours, or you have urinated only a small amount of very dark urine.  · You have shriveled skin.  · You are dizzy, confused, or both.     This information is not intended to replace advice given to you by your health care provider. Make sure you discuss any questions you have with your health care provider.     Document Released: 12/18/2006 Document Revised: 09/07/2016 Document Reviewed: 05/04/2016  [employer] Interactive Patient Education ©2017 [employer]      Viral Gastroenteritis, Adult  Viral gastroenteritis is also known as the stomach flu. This condition is caused by various viruses. These viruses can be passed from person to person very easily (are very contagious). This condition may affect your stomach, small intestine, and large intestine. It can cause sudden watery diarrhea, fever, and vomiting.  Diarrhea and vomiting can make you feel weak and cause you to become dehydrated. You may not be able to keep fluids down. Dehydration can make you tired and thirsty, cause you to have a dry mouth, and decrease how often you urinate. Older adults and people with other diseases or a weak immune system are at higher risk for dehydration.  It is important to replace the fluids that you lose from diarrhea and vomiting. If you become severely dehydrated, you may need to get fluids through an IV tube.  CAUSES  Gastroenteritis is caused by various viruses, including rotavirus and norovirus. Norovirus is the most common cause in adults.  You can get sick by eating food, drinking water, or touching a surface contaminated with one of these viruses. You can also get sick from sharing utensils or other personal items with an  infected person.  RISK FACTORS  This condition is more likely to develop in people:  · Who have a weak defense system (immune system).  · Who live with one or more children who are younger than 2 years old.  · Who live in a nursing home.  · Who go on cruise ships.  SYMPTOMS  Symptoms of this condition start suddenly 1-2 days after exposure to a virus. Symptoms may last a few days or as long as a week. The most common symptoms are watery diarrhea and vomiting. Other symptoms include:  · Fever.  · Headache.  · Fatigue.  · Pain in the abdomen.  · Chills.  · Weakness.  · Nausea.  · Muscle aches.  · Loss of appetite.  DIAGNOSIS  This condition is diagnosed with a medical history and physical exam. You may also have a stool test to check for viruses or other infections.  TREATMENT  This condition typically goes away on its own. The focus of treatment is to restore lost fluids (rehydration). Your health care provider may recommend that you take an oral rehydration solution (ORS) to replace important salts and minerals (electrolytes) in your body. Severe cases of this condition may require giving fluids through an IV tube.  Treatment may also include medicine to help with your symptoms.  HOME CARE INSTRUCTIONS  Follow instructions from your health care provider about how to care for yourself at home.  Eating and Drinking  Follow these recommendations as told by your health care provider:  · Take an ORS. This is a drink that is sold at pharmacies and retail stores.  · Drink clear fluids in small amounts as you are able. Clear fluids include water, ice chips, diluted fruit juice, and low-calorie sports drinks.  · Eat bland, easy-to-digest foods in small amounts as you are able. These foods include bananas, applesauce, rice, lean meats, toast, and crackers.  · Avoid fluids that contain a lot of sugar or caffeine, such as energy drinks, sports drinks, and soda.  · Avoid alcohol.  · Avoid spicy or fatty foods.  General  Instructions  · Drink enough fluid to keep your urine clear or pale yellow.  · Wash your hands often. If soap and water are not available, use hand .  · Make sure that all people in your household wash their hands well and often.  · Take over-the-counter and prescription medicines only as told by your health care provider.  · Rest at home while you recover.  · Watch your condition for any changes.  · Take a warm bath to relieve any burning or pain from frequent diarrhea episodes.  · Keep all follow-up visits as told by your health care provider. This is important.  SEEK MEDICAL CARE IF:  · You cannot keep fluids down.  · Your symptoms get worse.  · You have new symptoms.  · You feel light-headed or dizzy.  · You have muscle cramps.  SEEK IMMEDIATE MEDICAL CARE IF:  · You have chest pain.  · You feel extremely weak or you faint.  · You see blood in your vomit.  · Your vomit looks like coffee grounds.  · You have bloody or black stools or stools that look like tar.  · You have a severe headache, a stiff neck, or both.  · You have a rash.  · You have severe pain, cramping, or bloating in your abdomen.  · You have trouble breathing or you are breathing very quickly.  · Your heart is beating very quickly.  · Your skin feels cold and clammy.  · You feel confused.  · You have pain when you urinate.  · You have signs of dehydration, such as:    Dark urine, very little urine, or no urine.    Cracked lips.    Dry mouth.    Sunken eyes.    Sleepiness.    Weakness.     This information is not intended to replace advice given to you by your health care provider. Make sure you discuss any questions you have with your health care provider.     Document Released: 12/18/2006 Document Revised: 04/10/2017 Document Reviewed: 08/23/2016  [employer] Interactive Patient Education ©2017 [employer]

## 2017-08-14 NOTE — PROGRESS NOTES
"Ida Mcgee is a 62 y.o. female.     Nausea   This is a new problem. The current episode started yesterday. Episode frequency: 12-15 episodes vomiting yesterday. The problem has been rapidly improving. Associated symptoms include fatigue and nausea. Pertinent negatives include no abdominal pain, anorexia, chills, congestion, coughing, fever, headaches, urinary symptoms or vomiting. Associated symptoms comments: \"weakness\". Nothing aggravates the symptoms. Treatments tried: PO fluids  The treatment provided mild relief.    Patient reports that she has knee surgery scheduled for 8/17/17.  She was supposed to have IVC filter placed today prior to surgery, but this was canceled due to her illness from yesterday.  She has not had any additional vomiting or diarrhea today.  She reports she contacted her surgeon Dr. Costa who told her that she needed to be seen by her primary care to have IV fluid replacement.  She is planning to have IVC filter placed either tomorrow or the next day.     The following portions of the patient's history were reviewed and updated as appropriate: allergies, current medications, past family history, past medical history, past social history, past surgical history and problem list.    Review of Systems   Constitutional: Positive for fatigue. Negative for chills, fever and unexpected weight change.   HENT: Negative for congestion.    Respiratory: Negative for cough.    Gastrointestinal: Positive for diarrhea and nausea. Negative for abdominal distention, abdominal pain, anal bleeding, anorexia, blood in stool, constipation and vomiting.   Genitourinary: Negative for difficulty urinating, dyspareunia, dysuria, enuresis, flank pain, frequency, genital sores and hematuria.        Denies any decrease in urinary output   Neurological: Negative for headaches.       Objective   Physical Exam   Constitutional: Vital signs are normal. She appears well-developed and well-nourished. She is " cooperative. She does not appear ill. No distress.   Cardiovascular: Normal rate, regular rhythm, S1 normal, S2 normal and normal heart sounds.    No murmur heard.  Pulmonary/Chest: Effort normal and breath sounds normal.   Abdominal: Soft. Normal appearance and bowel sounds are normal. She exhibits no distension and no mass. There is no hepatosplenomegaly. There is no tenderness. There is no rebound, no CVA tenderness, no tenderness at McBurney's point and negative Arvizu's sign.   Neurological: She is alert. No cranial nerve deficit or sensory deficit.   Skin: Skin is warm, dry and intact.   Psychiatric: She has a normal mood and affect. Her speech is normal and behavior is normal. Judgment and thought content normal. Cognition and memory are normal.   Nursing note and vitals reviewed.      Assessment/Plan   Problems Addressed this Visit     None      Visit Diagnoses     Mild dehydration    -  Primary    Patient scheduled to have IVC filter placed 8/15 or 8/16, knee surgery 8/17    Relevant Orders    Ambulatory Referral to [facility] For Infusion Treatment    Basic metabolic panel    CBC w AUTO Differential    Vomiting and diarrhea        Relevant Orders    POC Urinalysis Dipstick, Automated (Completed)    Ambulatory Referral to [facility] For Infusion Treatment    Basic metabolic panel    CBC w AUTO Differential    Hematuria        Relevant Orders    Urinalysis w/Culture if Indicated    Ketonuria            1. Mild dehydration  Heart rate in the 90s.  Patient's urinalysis does show some mild dehydration with ketones in urine, although patient reports normal urine output.  There is also some small amount of protein and blood, which could be related to dehydration but I will also send urinalysis and for culture if needed to rule out any infection. Patient is scheduled to have surgery (Pre-op IVC filter placement and then knee surgery on 8/17) and was recommended by her orthopedist to have IV fluid replacement.  I will send her  down now to our infusion center to have stat CBC and BMP done, as well as 1 L infusion of normal saline over 1 hour for replacement.  Discussed with patient and  that she also needs to significantly increase her by mouth fluid intake, including water and diluted Gatorade or Powerade.    - Ambulatory Referral to [facility] For Infusion Treatment  - Basic metabolic panel  - CBC w AUTO Differential    2. Vomiting and diarrhea  Likely acute viral gastroenteritis, has resolved today.  Patient does report family members with similar symptoms in the past few days.    - POC Urinalysis Dipstick, Automated  - Ambulatory Referral to [facility] For Infusion Treatment  - Basic metabolic panel  - CBC w AUTO Differential    3. Hematuria  Maybe transient related to mild dehydration.  Will send urinalysis with culture as indicated to rule out infection.   - Urinalysis w/Culture if Indicated    4. Ketonuria

## 2017-08-14 NOTE — TELEPHONE ENCOUNTER
P/pt was scheduled today with Dr. Smith to have a mesh filter placed; however cancelled due to consistent vomitting & diarrhea all day Sunday 8/13/17 & today nausea & very weak. Pt spoke with Orthopedic Surgeon, Dr.Richard Costa & he informed pt to call  to see if there is anything he can do to get pt up to par so pt will be able to get mesh filter placed either Tuesday 8/15/17 or Wednesday 8/16/17 due to scheduled total LT knee surgery for Thursday 8/17/17.     Pls call pt #[phone].

## 2017-08-15 ENCOUNTER — TELEPHONE (OUTPATIENT)
Dept: INTERNAL MEDICINE | Age: 62
End: 2017-08-15

## 2017-08-15 DIAGNOSIS — E87.6 HYPOKALEMIA DUE TO LOSS OF POTASSIUM: Primary | ICD-10-CM

## 2017-08-15 RX ORDER — POTASSIUM CHLORIDE 750 MG/1
40 TABLET, FILM COATED, EXTENDED RELEASE ORAL ONCE
Qty: 4 TABLET | Refills: 0 | Status: SHIPPED | OUTPATIENT
Start: 2017-08-15 | End: 2017-08-15

## 2017-08-15 NOTE — TELEPHONE ENCOUNTER
Called pt regarding lab results. Pt was informed of slightly low K level. Pt was advised to take K tablets (40meQ) today, Rx already sent to pharmacy by SALVATORE Ogden. Pt was informed that low K can interfere with heart, and postpone surgery. Pt was advised to contact surgeon and update him on yesterday's visit. Pt was informed that labs have been sent to ortho, as well as pt's MyChart.  Pt demonstrated understanding of instructions.    KD

## 2017-08-15 NOTE — TELEPHONE ENCOUNTER
----- Message from SALVATORE Beasley sent at 8/15/2017  7:38 AM EDT -----  Please call patient.     Your potassium is a little low from the vomiting and diarrhea that you had. We need to replace this as low potassium can interfere with your heart and postpone your surgery. I am going to call in potassium tablets (40meq) that I want you to take today. Please make sure that she calls her surgeon to update them of her visit yesterday. I will send over her labs to her orthopedist.

## 2017-08-17 ENCOUNTER — TELEPHONE (OUTPATIENT)
Dept: INTERNAL MEDICINE | Age: 62
End: 2017-08-17

## 2017-08-17 ENCOUNTER — OFFICE VISIT (OUTPATIENT)
Dept: INTERNAL MEDICINE | Age: 62
End: 2017-08-17

## 2017-08-17 VITALS
TEMPERATURE: 97.7 F | HEIGHT: 64 IN | DIASTOLIC BLOOD PRESSURE: 74 MMHG | HEART RATE: 84 BPM | BODY MASS INDEX: 34.01 KG/M2 | WEIGHT: 199.2 LBS | OXYGEN SATURATION: 98 % | SYSTOLIC BLOOD PRESSURE: 134 MMHG

## 2017-08-17 DIAGNOSIS — E87.6 HYPOKALEMIA, GASTROINTESTINAL LOSSES: ICD-10-CM

## 2017-08-17 DIAGNOSIS — N39.0 RECURRENT UTI (URINARY TRACT INFECTION): Primary | ICD-10-CM

## 2017-08-17 LAB
BILIRUB BLD-MCNC: NEGATIVE MG/DL
CLARITY, POC: CLEAR
COLOR UR: YELLOW
GLUCOSE UR STRIP-MCNC: NEGATIVE MG/DL
KETONES UR QL: NEGATIVE
LEUKOCYTE EST, POC: ABNORMAL
NITRITE UR-MCNC: NEGATIVE MG/ML
PH UR: 5.5 [PH] (ref 5–8)
PROT UR STRIP-MCNC: NEGATIVE MG/DL
RBC # UR STRIP: ABNORMAL /UL
SP GR UR: 1.02 (ref 1–1.03)
UROBILINOGEN UR QL: NORMAL

## 2017-08-17 PROCEDURE — 99213 OFFICE O/P EST LOW 20 MIN: CPT | Performed by: NURSE PRACTITIONER

## 2017-08-17 PROCEDURE — 81003 URINALYSIS AUTO W/O SCOPE: CPT | Performed by: NURSE PRACTITIONER

## 2017-08-17 RX ORDER — NITROFURANTOIN 25; 75 MG/1; MG/1
100 CAPSULE ORAL 2 TIMES DAILY
Qty: 14 CAPSULE | Refills: 0 | Status: SHIPPED | OUTPATIENT
Start: 2017-08-17 | End: 2017-08-24

## 2017-08-17 NOTE — PATIENT INSTRUCTIONS
PLEASE CONTACT YOUR UROLOGIST FOR FURTHER EVALUATION AND FOLLOW UP AS NEEDED.     Urinary Tract Infection, Adult  A urinary tract infection (UTI) is an infection of any part of the urinary tract, which includes the kidneys, ureters, bladder, and urethra. These organs make, store, and get rid of urine in the body. UTI can be a bladder infection (cystitis) or kidney infection (pyelonephritis).  CAUSES  This infection may be caused by fungi, viruses, or bacteria. Bacteria are the most common cause of UTIs. This condition can also be caused by repeated incomplete emptying of the bladder during urination.   RISK FACTORS  This condition is more likely to develop if:   · You ignore your need to urinate or hold urine for long periods of time.  · You do not empty your bladder completely during urination.  · You wipe back to front after urinating or having a bowel movement, if you are female.  · You are uncircumcised, if you are male.    · You are constipated.  · You have a urinary catheter that stays in place (indwelling).  · You have a weak defense (immune) system.  · You have a medical condition that affects your bowels, kidneys, or bladder.  · You have diabetes.  · You take antibiotic medicines frequently or for long periods of time, and the antibiotics no longer work well against certain types of infections (antibiotic resistance).  · You take medicines that irritate your urinary tract.  · You are exposed to chemicals that irritate your urinary tract.  · You are female.  SYMPTOMS   Symptoms of this condition include:   · Fever.    · Frequent urination or passing small amounts of urine frequently.    · Needing to urinate urgently.    · Pain or burning with urination.    · Urine that smells bad or unusual.    · Cloudy urine.    · Pain in the lower abdomen or back.    · Trouble urinating.    · Blood in the urine.    · Vomiting or being less hungry than normal.     · Diarrhea or abdominal pain.    · Vaginal discharge, if you  are female.  DIAGNOSIS  This condition is diagnosed with a medical history and physical exam. You will also need to provide a urine sample to test your urine. Other tests may be done, including:    · Blood tests.    · Sexually transmitted disease (STD) testing.     If you have had more than one UTI, a cystoscopy or imaging studies may be done to determine the cause of the infections.   TREATMENT   Treatment for this condition often includes a combination of two or more of the following:   · Antibiotic medicine.    · Other medicines to treat less common causes of UTI.    · Over-the-counter medicines to treat pain.    · Drinking enough water to stay hydrated.  HOME CARE INSTRUCTIONS  · Take over-the-counter and prescription medicines only as told by your health care provider.  · If you were prescribed an antibiotic, take it as told by your health care provider. Do not stop taking the antibiotic even if you start to feel better.  · Avoid alcohol, caffeine, tea, and carbonated beverages. They can irritate your bladder.  · Drink enough fluid to keep your urine clear or pale yellow.  · Keep all follow-up visits as told by your health care provider. This is important.  · Make sure to:    Empty your bladder often and completely. Do not hold urine for long periods of time.    Empty your bladder before and after sex.    Wipe from front to back after a bowel movement if you are female. Use each tissue one time when you wipe.  SEEK MEDICAL CARE IF:   · You have back pain.    · You have a fever.  · You feel nauseous or vomit.    · Your symptoms do not get better after 3 days.     · Your symptoms go away and then return.  SEEK IMMEDIATE MEDICAL CARE IF:   · You have severe back pain or lower abdominal pain.    · You are vomiting and cannot keep down any medicines or water.     This information is not intended to replace advice given to you by your health care provider. Make sure you discuss any questions you have with your health  care provider.     Document Released: 09/27/2006 Document Revised: 04/10/2017 Document Reviewed: 11/07/2016  [employer] Interactive Patient Education ©2017 [employer]

## 2017-08-17 NOTE — PROGRESS NOTES
Ida Mcgee is a 62 y.o. female.     Urinary Tract Infection    This is a recurrent problem. The current episode started in the past 7 days. The problem has been unchanged. The patient is experiencing no pain. Associated symptoms include nausea. Pertinent negatives include no chills, discharge, flank pain, frequency, hematuria, hesitancy, possible pregnancy, sweats, urgency or vomiting. She has tried nothing for the symptoms. Her past medical history is significant for recurrent UTIs (Sees Dr. Brantley with urology.  Reports she just finished a one month course of ciprofloxacin on 8/12/17.  Has had negative workup for recurrent UTIs.). There is no history of kidney stones or a single kidney.    Patient was scheduled to have knee surgery on 8/17/17, she reports this was canceled due to her low potassium levels found in clinic.  At that time 3 days ago she had a potassium level of 3.2.  I had ordered 2 days ago 40 meq of by mouth KCl to be taken.    The following portions of the patient's history were reviewed and updated as appropriate: allergies, current medications, past family history, past medical history, past social history, past surgical history and problem list.    Review of Systems   Constitutional: Negative for chills.   Gastrointestinal: Positive for nausea. Negative for vomiting.   Genitourinary: Negative for difficulty urinating, dysuria, flank pain, frequency, hematuria, hesitancy, urgency and vaginal discharge.   Musculoskeletal: Positive for back pain.       Objective   Physical Exam   Constitutional: Vital signs are normal. She appears well-developed and well-nourished. She is cooperative. She does not appear ill. No distress.   Cardiovascular: Normal rate, regular rhythm and normal heart sounds.    No murmur heard.  Pulmonary/Chest: Effort normal and breath sounds normal.   Abdominal: There is no CVA tenderness.   Neurological: She is alert.   Skin: Skin is warm, dry and intact.    Psychiatric: She has a normal mood and affect. Her speech is normal and behavior is normal. Judgment and thought content normal. Cognition and memory are normal.   Nursing note and vitals reviewed.      Assessment/Plan   Problems Addressed this Visit     None      Visit Diagnoses     Recurrent UTI (urinary tract infection)    -  Primary    Relevant Medications    nitrofurantoin, macrocrystal-monohydrate, (MACROBID) 100 MG capsule    Other Relevant Orders    Basic metabolic panel    Urinalysis w/Culture if Indicated    POCT urinalysis dipstick, automated (Completed)    Hypokalemia, gastrointestinal losses        Relevant Orders    Basic metabolic panel        1. Recurrent UTI (urinary tract infection)  Patient has history of recurrent urinary tract infections, for which she has been followed by urology Dr. Brantley.  She reports 9 urinary tract infection since October 2016.  She reports she has had extensive urological workup which was all negative and recently finished a one month course of ciprofloxacin prescribed by urology on 8/12/17.  Most recent urine culture that I could review was from December last year, showed Escherichia coli which was susceptible to most antibiotics.  I discussed with patient we will put her on a 7 day course of Macrobid as she is just finished ciprofloxacin.  I instructed her that she needs to notify her follow-up with her urologist of this current infection.    - Basic metabolic panel  - nitrofurantoin, macrocrystal-monohydrate, (MACROBID) 100 MG capsule; Take 1 capsule by mouth 2 (Two) Times a Day for 7 days.  Dispense: 14 capsule; Refill: 0  - Urinalysis w/Culture if Indicated  - POCT urinalysis dipstick, automated    2. Hypokalemia, gastrointestinal losses  Patient had decreased potassium on 8/14/17 of 3.2 related to acute gastroenteritis (vomiting and diarrhea).  She was replaced with 40 meq of PO KCl 2 days ago.  I will obtain BMP today to determine current potassium level.    -  Basic metabolic panel

## 2017-08-17 NOTE — TELEPHONE ENCOUNTER
Pt states she took a home test which shows she has a UTI. Pt asking for an appt.  Pt's # [phone]  Thanks SP

## 2017-08-17 NOTE — TELEPHONE ENCOUNTER
Pt states she is still very sick to her stomach and fatigue, she said she just feels very tired wanting to be seen.

## 2017-08-20 LAB
APPEARANCE UR: CLEAR
BACTERIA #/AREA URNS HPF: NORMAL /HPF
BACTERIA UR CULT: ABNORMAL
BACTERIA UR CULT: ABNORMAL
BILIRUB UR QL STRIP: NEGATIVE
BUN SERPL-MCNC: 7 MG/DL (ref 8–23)
BUN/CREAT SERPL: 8.8 (ref 7–25)
CALCIUM SERPL-MCNC: 9.6 MG/DL (ref 8.6–10.5)
CHLORIDE SERPL-SCNC: 101 MMOL/L (ref 98–107)
CO2 SERPL-SCNC: 26.3 MMOL/L (ref 22–29)
COLOR UR: YELLOW
CREAT SERPL-MCNC: 0.8 MG/DL (ref 0.57–1)
EPI CELLS #/AREA URNS HPF: NORMAL /HPF
GLUCOSE SERPL-MCNC: 79 MG/DL (ref 65–99)
GLUCOSE UR QL: NEGATIVE
HGB UR QL STRIP: NEGATIVE
KETONES UR QL STRIP: NEGATIVE
LEUKOCYTE ESTERASE UR QL STRIP: ABNORMAL
MICRO URNS: ABNORMAL
MUCOUS THREADS URNS QL MICRO: PRESENT /HPF
NITRITE UR QL STRIP: NEGATIVE
OTHER ANTIBIOTIC SUSC ISLT: ABNORMAL
PH UR STRIP: 5.5 [PH] (ref 5–7.5)
POTASSIUM SERPL-SCNC: 4.4 MMOL/L (ref 3.5–5.2)
PROT UR QL STRIP: NEGATIVE
RBC #/AREA URNS HPF: NORMAL /HPF
SODIUM SERPL-SCNC: 142 MMOL/L (ref 136–145)
SP GR UR: 1.01 (ref 1–1.03)
URINALYSIS REFLEX: ABNORMAL
UROBILINOGEN UR STRIP-MCNC: 0.2 MG/DL (ref 0.2–1)
WBC #/AREA URNS HPF: NORMAL /HPF

## 2017-08-21 ENCOUNTER — TELEPHONE (OUTPATIENT)
Dept: INTERNAL MEDICINE | Age: 62
End: 2017-08-21

## 2017-08-21 NOTE — TELEPHONE ENCOUNTER
----- Message from SALVATORE Beasley sent at 8/21/2017  7:36 AM EDT -----  Results released to [facility]. Please call as well and inform her of results.     Kaci:     Here are the results of your labs that we did in the office at your last visit.   Your BMP shows that your potassium is back to normal level.   Your urine culture was positive for bacterial infection. The lab testing does show that it is sensitive to the current antibiotic that you are on, which means that the antibiotic is appropriate to treat the infection. I would complete all of the antibiotic and follow up as needed if the symptoms do not resolve or improve.   Please feel free to call the office or contact me through [facility] with any questions or concerns.     Melvi CHASE

## 2017-08-21 NOTE — TELEPHONE ENCOUNTER
Called pt regarding lab results. Pt was informed that K is back within normal range.  Pt was also informed that urine culture showed bacterial growth. Pt was informed that bacteria is sensitive to current antibiotic therapy; pt was instructed to finish all of the antibiotic and f/u as needed if s/s do not improve.  Pt demonstrated understanding.    KD

## 2017-08-28 ENCOUNTER — OFFICE VISIT (OUTPATIENT)
Dept: INTERNAL MEDICINE | Age: 62
End: 2017-08-28

## 2017-08-28 VITALS
BODY MASS INDEX: 34.93 KG/M2 | DIASTOLIC BLOOD PRESSURE: 62 MMHG | HEIGHT: 64 IN | SYSTOLIC BLOOD PRESSURE: 118 MMHG | HEART RATE: 60 BPM | WEIGHT: 204.6 LBS | TEMPERATURE: 98.5 F | OXYGEN SATURATION: 98 %

## 2017-08-28 DIAGNOSIS — N39.0 URINARY TRACT INFECTION WITHOUT HEMATURIA, SITE UNSPECIFIED: Primary | ICD-10-CM

## 2017-08-28 PROCEDURE — 99213 OFFICE O/P EST LOW 20 MIN: CPT | Performed by: INTERNAL MEDICINE

## 2017-08-28 NOTE — PROGRESS NOTES
"Kaci A [place] / 62 y.o. / female  08/28/2017    VITALS    /62  Pulse 60  Temp 98.5 °F (36.9 °C)  Ht 64\" (162.6 cm)  Wt 204 lb 9.6 oz (92.8 kg)  SpO2 98%  Breastfeeding? No  BMI 35.12 kg/m2  BP Readings from Last 3 Encounters:   08/28/17 118/62   08/17/17 134/74   08/14/17 119/59     Wt Readings from Last 3 Encounters:   08/28/17 204 lb 9.6 oz (92.8 kg)   08/17/17 199 lb 3.2 oz (90.4 kg)   08/14/17 195 lb 9.6 oz (88.7 kg)      Body mass index is 35.12 kg/(m^2).    CC:  Main reason(s) for today's visit: Urinary Tract Infection (not  improving)      ____________________________________________________________________    ASSESSMENT & PLAN:  1. Urinary tract infection without hematuria, site unspecified      Orders Placed This Encounter   Procedures   • Urinalysis With / Culture If Indicated        Summary/Discussion:     · Recheck urinalysis w/ culture.   · At this time she is w/o clinical sign/sx's of UTI.  · If culture is positive, treat as indicated   · She was instructed to make a f/u appt w Dr. Granda.       No Follow-up on file.    Future Appointments  Date Time Provider Department Center   9/12/2017 11:00 AM MD [facility] None   9/19/2017 1:00 PM  [facility] 3 [facility]   9/28/2017 11:10 AM [facility] None   11/8/2017 11:15 AM [facility] PROVIDER SCHEDULE  [facility]   1/16/2018 11:10 AM [facility], [facility] None   1/23/2018 1:45 PM Giorgi Chennubhotla V, MD [facility] None     ____________________________________________________________________    HPI:     Seen by NP and treated for culture + UTI with macrobid x 7 days. She really did not have any clinical symptoms except chronic bilateral back pain. No gross hematuria, dysuria, urgency/frequency, fever/chills. CT of abd/pelvis from 3/2017 showed no significant kidney/ureter problems. She sees Dr. Granda but last time she called his office she was instructed to go " to the ED.       Patient Care Team:  Sarwat Valenzuela MD as PCP - General (Internal Medicine)  Giorgi VIERA MD as Consulting Physician (Endocrinology)  Ameya Granda MD as Consulting Physician (Urology)  Giovana Mooney MD as Surgeon (General Surgery)  Hector Trujillo MD as Consulting Physician (Sleep Medicine)      REVIEW OF SYSTEMS    Review of Systems  Other: As noted per HPI  Chronic back pain   H/o fibromyalgia    PHYSICAL EXAMINATION    Physical Exam  Alert, NAD  Bilateral mid back tenderness w/ slight pressure without percussion    REVIEWED DATA:    Labs:   Urine Culture Final report (A)   Result 1 Citrobacter koseri (A)   Comments: Greater than 100,000 colony forming units per mL   Susceptibility Testing Comment   Comments:       ** S = Susceptible; I = Intermediate; R = Resistant **                      P = Positive; N = Negative               MICS are expressed in micrograms per mL      Antibiotic                 RSLT#1    RSLT#2    RSLT#3    RSLT#4   Amoxicillin/Clavulanic Acid    S   Cefepime                       S   Ceftriaxone                    S   Cefuroxime                     I   Cephalothin                    S   Ciprofloxacin                  S   Ertapenem                      S   Gentamicin                     S   Imipenem                       S   Levofloxacin                   S   Nitrofurantoin                 S   Piperacillin                   R   Tetracycline                   S   Tobramycin                     S   Trimethoprim/Sulfa             S          Imaging:   Outside CT from 3/2017: normal kidneys/ureters     Medical Tests:        Summary of old records / correspondence / consultant report:        Request outside records:       Allergies   Allergen Reactions   • Codeine Nausea And Vomiting   • Zofran [Ondansetron Hcl] Other (See Comments)     CONSTIPATION        Current Outpatient Prescriptions on File Prior to Visit   Medication Sig Dispense Refill   • acebutolol  (SECTRAL) 200 MG capsule Take 1 capsule by mouth Daily. (Patient taking differently: Take 200 mg by mouth Every Morning.) 30 capsule 5   • albuterol (PROVENTIL HFA;VENTOLIN HFA) 108 (90 BASE) MCG/ACT inhaler Inhale 2 puffs 2 (Two) Times a Day.     • ALPRAZolam (XANAX) 1 MG tablet Take 1 tablet by mouth 2 (Two) Times a Day As Needed for Anxiety. 44 tablet 0   • atorvastatin (LIPITOR) 20 MG tablet Take 20 mg by mouth Every Night.     • buPROPion SR (WELLBUTRIN SR) 150 MG 12 hr tablet Take 1 tablet by mouth 2 (Two) Times a Day. (Patient taking differently: Take 150 mg by mouth Daily.) 60 tablet 1   • butalbital-acetaminophen  MG tablet tablet Take 1 tablet by mouth 2 (Two) Times a Day As Needed (tension headaches). 60 tablet 0   • cetirizine (ZYRTEC ALLERGY) 10 MG tablet Take 10 mg by mouth daily.     • Chlorhexidine Gluconate Cloth 2 % pads Apply  topically. USE AS DIRECTED FOR PRE OP     • Cholecalciferol (VITAMIN D3) 2000 UNITS tablet Take 2,000 Units by mouth Daily.     • FLUoxetine HCl (PROZAC PO) Take 60 mg by mouth Daily.     • folic acid (FOLVITE) 1 MG tablet Take 1 tablet by mouth Daily. 30 tablet 5   • levothyroxine (SYNTHROID, LEVOTHROID) 125 MCG tablet 1/2 tablet daily (Patient taking differently: Take 65 mcg by mouth Daily. 1/2 tablet daily) 30 tablet 3   • montelukast (SINGULAIR) 10 MG tablet Take 10 mg by mouth Every Night.     • mupirocin (BACTROBAN) 2 % ointment Apply  topically. USE AS DIRECTED FOR PRE OP     • fluticasone-salmeterol (ADVAIR) 500-50 MCG/DOSE DISKUS Inhale 1 puff 2 (Two) Times a Day.       No current facility-administered medications on file prior to visit.        PFSH:     The following portions of the patient's history were reviewed and updated as appropriate: Allergies / Current Medications / Past Medical History / Surgical History / Social History / Family History    Patient Active Problem List   Diagnosis   • Atopic rhinitis   • Migraine without aura and without status  migrainosus, not intractable   • Common variable agammaglobulinemia   • Mixed anxiety depressive disorder   • Fibromyalgia   • Hyperlipidemia   • Multinodular goiter   • Osteoarthritis   • Asthmatic bronchitis   • Subclinical hypothyroidism   • Chronic mixed headache syndrome   • Impaired fasting glucose   • Abdominal aortic atherosclerosis   • Cigarette nicotine dependence with nicotine-induced disorder   • Abnormal weight gain   • DARRION on autoCPAP       Past Medical History:   Diagnosis Date   • Anxiety and depression    • Aortic sclerosis    • Asthma    • CVID (common variable immunodeficiency)    • Deep vein thrombosis     BEHIND LEFT KNEE   • Fibromyalgia    • Fibromyalgia, primary    • History of pneumonia    • Hyperlipidemia    • Hypothyroidism    • Lupus     skin   • Migraines    • MVP (mitral valve prolapse)    • Osteoarthritis    • Osteopenia    • Osteoporosis    • PONV (postoperative nausea and vomiting)    • Pulmonary embolism    • Recurrent UTI    • Sleep apnea     CPAP Machine #11       Past Surgical History:   Procedure Laterality Date   • CARDIAC CATHETERIZATION     • CHOLECYSTECTOMY     • COLONOSCOPY     • EPIDURAL BLOCK     • HYSTERECTOMY     • KNEE ARTHROSCOPY      calcium scraping   • NECK SURGERY      C 5 AND 6 TITANIUM PLATE   • NO PAST SURGERIES     • PARTIAL HYSTERECTOMY     • SKIN BIOPSY      benign       Social History     Social History   • Marital status:      Spouse name: N/A   • Number of children: N/A   • Years of education: N/A     Occupational History   • Unemployed currently      Social History Main Topics   • Smoking status: Current Every Day Smoker     Packs/day: 1.00     Years: 30.00     Types: Cigarettes   • Smokeless tobacco: Never Used   • Alcohol use No   • Drug use: No   • Sexual activity: Yes     Partners: Male     Birth control/ protection: Post-menopausal, Other      Comment: Hysterectomy     Other Topics Concern   • None     Social History Narrative       Family  History   Problem Relation Age of Onset   • Thyroid disease Mother      Hypothyroid   • No Known Problems Father      N/A   • Breast cancer Sister    • Cerebral aneurysm Sister    • Thyroid cancer Cousin    • Cerebral aneurysm Sister    • Arthritis Brother    • Cancer Sister      Breast   • Depression Sister    • Thyroid disease Sister      Hyperthyroid   • Malig Hyperthermia Neg Hx          **Dragon Disclaimer:   Much of this encounter note is an electronic transcription/translation of spoken language to printed text. The electronic translation of spoken language may permit erroneous, or at times, nonsensical words or phrases to be inadvertently transcribed. Although I have reviewed the note for such errors, some may still exist.

## 2017-08-29 ENCOUNTER — TELEPHONE (OUTPATIENT)
Dept: INTERNAL MEDICINE | Age: 62
End: 2017-08-29

## 2017-08-29 LAB
APPEARANCE UR: CLEAR
BACTERIA #/AREA URNS HPF: NORMAL /HPF
BILIRUB UR QL STRIP: NEGATIVE
COLOR UR: YELLOW
EPI CELLS #/AREA URNS HPF: NORMAL /HPF
GLUCOSE UR QL: NEGATIVE
HGB UR QL STRIP: NEGATIVE
KETONES UR QL STRIP: NEGATIVE
LEUKOCYTE ESTERASE UR QL STRIP: NEGATIVE
MICRO URNS: NORMAL
MICRO URNS: NORMAL
MUCOUS THREADS URNS QL MICRO: PRESENT /HPF
NITRITE UR QL STRIP: NEGATIVE
PH UR STRIP: 6.5 [PH] (ref 5–7.5)
PROT UR QL STRIP: NEGATIVE
RBC #/AREA URNS HPF: NORMAL /HPF
SP GR UR: 1.01 (ref 1–1.03)
URINALYSIS REFLEX: NORMAL
UROBILINOGEN UR STRIP-MCNC: 0.2 MG/DL (ref 0.2–1)
WBC #/AREA URNS HPF: NORMAL /HPF

## 2017-09-06 DIAGNOSIS — F41.8 MIXED ANXIETY AND DEPRESSIVE DISORDER: ICD-10-CM

## 2017-09-06 RX ORDER — ALPRAZOLAM 1 MG/1
1 TABLET ORAL 2 TIMES DAILY PRN
Qty: 44 TABLET | Refills: 0 | OUTPATIENT
Start: 2017-09-06 | End: 2017-10-12 | Stop reason: SDUPTHER

## 2017-09-08 ENCOUNTER — LAB REQUISITION (OUTPATIENT)
Dept: LAB | Facility: HOSPITAL | Age: 62
End: 2017-09-08

## 2017-09-08 DIAGNOSIS — N39.0 URINARY TRACT INFECTION: ICD-10-CM

## 2017-09-08 PROCEDURE — 87086 URINE CULTURE/COLONY COUNT: CPT | Performed by: UROLOGY

## 2017-09-10 LAB — BACTERIA SPEC AEROBE CULT: NO GROWTH

## 2017-09-11 DIAGNOSIS — J45.909 UNCOMPLICATED ASTHMA, UNSPECIFIED ASTHMA SEVERITY: ICD-10-CM

## 2017-09-12 ENCOUNTER — OFFICE VISIT (OUTPATIENT)
Dept: INTERNAL MEDICINE | Age: 62
End: 2017-09-12

## 2017-09-12 VITALS
HEIGHT: 64 IN | HEART RATE: 68 BPM | BODY MASS INDEX: 34.31 KG/M2 | WEIGHT: 201 LBS | OXYGEN SATURATION: 97 % | DIASTOLIC BLOOD PRESSURE: 68 MMHG | TEMPERATURE: 97.1 F | SYSTOLIC BLOOD PRESSURE: 124 MMHG

## 2017-09-12 DIAGNOSIS — G43.009 MIGRAINE WITHOUT AURA AND WITHOUT STATUS MIGRAINOSUS, NOT INTRACTABLE: Chronic | ICD-10-CM

## 2017-09-12 DIAGNOSIS — E78.2 MIXED HYPERLIPIDEMIA: Chronic | ICD-10-CM

## 2017-09-12 DIAGNOSIS — G44.89 CHRONIC MIXED HEADACHE SYNDROME: Chronic | ICD-10-CM

## 2017-09-12 DIAGNOSIS — F17.219 CIGARETTE NICOTINE DEPENDENCE WITH NICOTINE-INDUCED DISORDER: Chronic | ICD-10-CM

## 2017-09-12 DIAGNOSIS — Z51.81 THERAPEUTIC DRUG MONITORING: Primary | ICD-10-CM

## 2017-09-12 DIAGNOSIS — F41.8 MIXED ANXIETY DEPRESSIVE DISORDER: Primary | Chronic | ICD-10-CM

## 2017-09-12 PROCEDURE — 99214 OFFICE O/P EST MOD 30 MIN: CPT | Performed by: INTERNAL MEDICINE

## 2017-09-12 RX ORDER — BUTALBITAL/ACETAMINOPHEN 50MG-325MG
1 TABLET ORAL 2 TIMES DAILY PRN
Qty: 60 TABLET | Refills: 2
Start: 2017-09-12 | End: 2017-12-06 | Stop reason: SDUPTHER

## 2017-09-12 NOTE — PROGRESS NOTES
"[facility] [place] [facility]  DEISY VALENZUELA M.D.      Kaci YOVANNY Arely / 62 y.o. / female  09/12/2017    VITALS:    /68  Pulse 68  Temp 97.1 °F (36.2 °C)  Ht 64\" (162.6 cm)  Wt 201 lb (91.2 kg)  SpO2 97%  BMI 34.5 kg/m2  BP Readings from Last 3 Encounters:   09/12/17 124/68   08/28/17 118/62   08/17/17 134/74     Wt Readings from Last 3 Encounters:   09/12/17 201 lb (91.2 kg)   08/28/17 204 lb 9.6 oz (92.8 kg)   08/17/17 199 lb 3.2 oz (90.4 kg)      Body mass index is 34.5 kg/(m^2).    CC:  Main reason(s) for today's visit: Hypothyroidism and Hyperlipidemia      HPI:     Patient was able to get down to 3 cigarettes per day on Wellbutrin but unfortunately resumed smoking when she got worked up about hematuria for which she has seen her urologist.  She is back to smoking 1 pack per day.  She reports increased anxiety when she was told she had hematuria.  She has history of depression with anxiety and takes fluoxetine 60 mg daily and her depression is overall remained stable.  She has history of migraines and needs a prescription for Fioricet which remains effective for her.  She is on atorvastatin for hyperlipidemia without significant myalgia symptoms.    Patient Care Team:  Sarwat Valenzuela MD as PCP - General (Internal Medicine)  Giorgi VIERA MD as Consulting Physician (Endocrinology)  Ameya Granda MD as Consulting Physician (Urology)  Giovana Mooney MD as Surgeon (General Surgery)  Hector Trujillo MD as Consulting Physician (Sleep Medicine)  ____________________________________________________________________    ASSESSMENT & PLAN:    Problem List Items Addressed This Visit     Mixed anxiety depressive disorder - Primary (Chronic)    Overview     Continue fluoxetine 60 mg qd; will resume bupropion for smoking cessation.   Continue alprazolam 1 mg bid prn for significant anxiety. Consent/agreement updated.          Relevant Medications    buPROPion SR (WELLBUTRIN SR) 150 MG 12 hr tablet    FLUoxetine " HCl (PROZAC PO)    ALPRAZolam (XANAX) 1 MG tablet    Cigarette nicotine dependence with nicotine-induced disorder (Chronic)    Current Assessment & Plan     Counseled on smoking cessation x 5 minutes:  Resume bupropion SR, recommended NRT (patch), advised to watch for worsening depression/anxiety/SI.          Relevant Medications    buPROPion SR (WELLBUTRIN SR) 150 MG 12 hr tablet    FLUoxetine HCl (PROZAC PO)    ALPRAZolam (XANAX) 1 MG tablet    Migraine without aura and without status migrainosus, not intractable (Chronic)    Overview     Continue Fioricet as needed for headaches.   Consent/agreement updated.          Relevant Medications    acebutolol (SECTRAL) 200 MG capsule    buPROPion SR (WELLBUTRIN SR) 150 MG 12 hr tablet    FLUoxetine HCl (PROZAC PO)    butalbital-acetaminophen  MG tablet tablet    Hyperlipidemia (Chronic)    Overview     Stable. Continue atorvastatin 20 mg.          Relevant Medications    atorvastatin (LIPITOR) 20 MG tablet        No orders of the defined types were placed in this encounter.      Summary/Discussion:     ·     Return in about 4 months (around 1/12/2018) for Anxiety, headaches.    Future Appointments  Date Time Provider Department Center   9/19/2017 1:00 PM  [facility] PAT 3  [facility] PAT ABDIRASHID   9/28/2017 11:10 AM [facility] None   11/8/2017 11:15 AM  [facility] SLEEP LAB PROVIDER SCHEDULE  [facility] SLEEP ABDIR[facility]ID   1/9/2018 11:00 AM MD [facility] None   1/16/2018 11:10 AM [facility] None   1/23/2018 1:45 PM MD YULIET [facility] END [facility] None       ____________________________________________________________________    REVIEW OF SYSTEMS    Review of Systems  As noted per HPI  Constitutional neg  Resp neg  CV neg  Neuro neg for change   neg for gross hematuria   Other: As noted per HPI      PHYSICAL EXAMINATION    Physical Exam  Constitutional  No distress  Cardiovascular Rate  normal . Rhythm: regular . Heart sounds:   Normal  Pulmonary/Chest  Effort normal. Breath sounds:  Mild exp wheezing  Psychiatric  Alert. Judgment and thought content normal. Mood mildly dysphoric. No SI.     REVIEWED DATA:    Labs:     Lab Results   Component Value Date     08/17/2017    K 4.4 08/17/2017    AST 14 08/03/2017    ALT 21 08/03/2017    BUN 7 (L) 08/17/2017    CREATININE 0.80 08/17/2017    CREATININE 0.79 08/14/2017    CREATININE 0.82 08/03/2017    EGFRIFNONA 73 08/17/2017    EGFRIFAFRI 88 08/17/2017       Lab Results   Component Value Date    HGBA1C 5.3 02/23/2016    HGBA1C 5.4 04/09/2015    GLU 79 08/17/2017     (H) 03/06/2017    GLU 96 02/23/2016       Lab Results   Component Value Date     (H) 05/09/2017     02/23/2016     (H) 04/09/2015    HDL 45 05/09/2017    TRIG 103 05/09/2017    CHOLHDLRATIO 3.73 05/09/2017       Lab Results   Component Value Date    TSH 0.973 07/31/2017    FREET4 0.87 (L) 07/31/2017       Lab Results   Component Value Date    WBC 4.85 08/14/2017    HGB 14.1 08/14/2017    HGB 13.9 08/03/2017    HGB 13.7 12/21/2016     08/14/2017       Imaging:        Medical Tests:        Summary of old records / correspondence / consultant report:        Request outside records:        Allergies   Allergen Reactions   • Codeine Nausea And Vomiting   • Zofran [Ondansetron Hcl] Other (See Comments)     CONSTIPATION        Current Outpatient Prescriptions   Medication Sig Dispense Refill   • acebutolol (SECTRAL) 200 MG capsule Take 1 capsule by mouth Daily. (Patient taking differently: Take 200 mg by mouth Every Morning.) 30 capsule 5   • albuterol (PROVENTIL HFA;VENTOLIN HFA) 108 (90 BASE) MCG/ACT inhaler Inhale 2 puffs 2 (Two) Times a Day.     • ALPRAZolam (XANAX) 1 MG tablet Take 1 tablet by mouth 2 (Two) Times a Day As Needed for Anxiety. 44 tablet 0   • atorvastatin (LIPITOR) 20 MG tablet Take 20 mg by mouth Every Night.     • butalbital-acetaminophen  MG tablet tablet Take 1 tablet by  mouth 2 (Two) Times a Day As Needed (tension headaches). 60 tablet 0   • cetirizine (ZYRTEC ALLERGY) 10 MG tablet Take 10 mg by mouth daily.     • Cholecalciferol (VITAMIN D3) 2000 UNITS tablet Take 2,000 Units by mouth Daily.     • FLUoxetine HCl (PROZAC PO) Take 60 mg by mouth Daily.     • fluticasone-salmeterol (ADVAIR) 500-50 MCG/DOSE DISKUS Inhale 1 puff 2 (Two) Times a Day.     • folic acid (FOLVITE) 1 MG tablet Take 1 tablet by mouth Daily. 30 tablet 5   • levothyroxine (SYNTHROID, LEVOTHROID) 125 MCG tablet 1/2 tablet daily (Patient taking differently: Take 65 mcg by mouth Daily. 1/2 tablet daily) 30 tablet 3   • montelukast (SINGULAIR) 10 MG tablet Take 10 mg by mouth Every Night.     • mupirocin (BACTROBAN) 2 % ointment Apply  topically. USE AS DIRECTED FOR PRE OP     • PROAIR  (90 Base) MCG/ACT inhaler inhale 2 puffs by mouth every 6 hours if needed 8.5 inhaler 1   • buPROPion SR (WELLBUTRIN SR) 150 MG 12 hr tablet Take 1 tablet by mouth 2 (Two) Times a Day. (Patient taking differently: Take 150 mg by mouth Daily.) 60 tablet 1   • Chlorhexidine Gluconate Cloth 2 % pads Apply  topically. USE AS DIRECTED FOR PRE OP       No current facility-administered medications for this visit.         PFSH:     The following portions of the patient's history were reviewed and updated as appropriate: Allergies / Current Medications / Past Medical History / Surgical History / Social History / Family History    Patient Active Problem List   Diagnosis   • Atopic rhinitis   • Migraine without aura and without status migrainosus, not intractable   • Common variable agammaglobulinemia   • Mixed anxiety depressive disorder   • Fibromyalgia   • Hyperlipidemia   • Multinodular goiter   • Osteoarthritis   • Asthmatic bronchitis   • Subclinical hypothyroidism   • Chronic mixed headache syndrome   • Impaired fasting glucose   • Abdominal aortic atherosclerosis   • Cigarette nicotine dependence with nicotine-induced disorder    • Abnormal weight gain   • DARRION on autoCPAP       Past Medical History:   Diagnosis Date   • Anxiety and depression    • Aortic sclerosis    • Asthma    • CVID (common variable immunodeficiency)    • Deep vein thrombosis     BEHIND LEFT KNEE   • Fibromyalgia    • Fibromyalgia, primary    • History of pneumonia    • Hyperlipidemia    • Hypothyroidism    • Lupus     skin   • Migraines    • MVP (mitral valve prolapse)    • Osteoarthritis    • Osteopenia    • Osteoporosis    • PONV (postoperative nausea and vomiting)    • Pulmonary embolism    • Recurrent UTI    • Sleep apnea     CPAP Machine #11       Past Surgical History:   Procedure Laterality Date   • CARDIAC CATHETERIZATION     • CHOLECYSTECTOMY     • COLONOSCOPY     • EPIDURAL BLOCK     • HYSTERECTOMY     • KNEE ARTHROSCOPY      calcium scraping   • NECK SURGERY      C 5 AND 6 TITANIUM PLATE   • PARTIAL HYSTERECTOMY     • SKIN BIOPSY      benign       Social History     Social History   • Marital status:      Spouse name: N/A   • Number of children: N/A   • Years of education: N/A     Occupational History   • Unemployed currently      Social History Main Topics   • Smoking status: Current Every Day Smoker     Packs/day: 1.00     Years: 30.00     Types: Cigarettes   • Smokeless tobacco: Never Used   • Alcohol use No   • Drug use: No   • Sexual activity: Yes     Partners: Male     Birth control/ protection: Post-menopausal, Other      Comment: Hysterectomy     Other Topics Concern   • None     Social History Narrative       Family History   Problem Relation Age of Onset   • Thyroid disease Mother      Hypothyroid   • No Known Problems Father      N/A   • Breast cancer Sister    • Cerebral aneurysm Sister    • Thyroid cancer Cousin    • Cerebral aneurysm Sister    • Arthritis Brother    • Cancer Sister      Breast   • Depression Sister    • Thyroid disease Sister      Hyperthyroid   • Malig Hyperthermia Neg Hx          **Dragon Disclaimer:   Much of this  encounter note is an electronic transcription/translation of spoken language to printed text. The electronic translation of spoken language may permit erroneous, or at times, nonsensical words or phrases to be inadvertently transcribed. Although I have reviewed the note for such errors, some may still exist.

## 2017-09-12 NOTE — ASSESSMENT & PLAN NOTE
Counseled on smoking cessation x 5 minutes:  Resume bupropion SR, recommended NRT (patch), advised to watch for worsening depression/anxiety/SI.

## 2017-09-19 ENCOUNTER — TELEPHONE (OUTPATIENT)
Dept: INTERNAL MEDICINE | Age: 62
End: 2017-09-19

## 2017-09-19 ENCOUNTER — APPOINTMENT (OUTPATIENT)
Dept: PREADMISSION TESTING | Facility: HOSPITAL | Age: 62
End: 2017-09-19

## 2017-09-19 NOTE — TELEPHONE ENCOUNTER
"----- Message from Kaci Mcgee sent at 2017 12:43 PM EDT -----  Regarding: Test Results Question  Contact: [phone]  Dr. Valenzuela,     I received my report from my 2nd CT Scan performed at [facility] of my kidneys done on 17.  I have attached the scan here on [email or web address] for you to review.    I am very concerned that this scan says that I have a \"Minimal Peripheral Thrombus\" in my abdominal aorta.    Is this a blood clot? Should I go to the hospital? I am very concerned since I have had many blood clots before. Please let me know if I should go to the hospital and if this is dangerous?     Thank You, Kaci Mcgee [phone]        : [birth year]  "

## 2017-09-19 NOTE — TELEPHONE ENCOUNTER
She has extensive atherosclerosis of her abdominal aorta. Aggressive risk factor modifications. Nothing to do at this time about this.

## 2017-09-20 LAB — DRUGS UR: NORMAL

## 2017-09-28 ENCOUNTER — RESULTS ENCOUNTER (OUTPATIENT)
Dept: ENDOCRINOLOGY | Age: 62
End: 2017-09-28

## 2017-09-28 DIAGNOSIS — E03.8 SUBCLINICAL HYPOTHYROIDISM: Chronic | ICD-10-CM

## 2017-09-28 DIAGNOSIS — Z88.9 HISTORY OF MULTIPLE ALLERGIES: ICD-10-CM

## 2017-09-28 DIAGNOSIS — E04.2 MULTINODULAR GOITER: Chronic | ICD-10-CM

## 2017-09-28 RX ORDER — MONTELUKAST SODIUM 10 MG/1
TABLET ORAL
Qty: 30 TABLET | Refills: 5 | Status: SHIPPED | OUTPATIENT
Start: 2017-09-28 | End: 2017-10-27 | Stop reason: SDUPTHER

## 2017-09-29 ENCOUNTER — LAB REQUISITION (OUTPATIENT)
Dept: LAB | Facility: HOSPITAL | Age: 62
End: 2017-09-29

## 2017-09-29 DIAGNOSIS — N39.0 URINARY TRACT INFECTION: ICD-10-CM

## 2017-09-29 LAB
T4 FREE SERPL-MCNC: 1.09 NG/DL (ref 0.93–1.7)
TSH SERPL DL<=0.005 MIU/L-ACNC: 0.69 MIU/ML (ref 0.27–4.2)

## 2017-09-29 PROCEDURE — 87086 URINE CULTURE/COLONY COUNT: CPT | Performed by: UROLOGY

## 2017-10-01 LAB — BACTERIA SPEC AEROBE CULT: NO GROWTH

## 2017-10-03 DIAGNOSIS — R51.9 CHRONIC DAILY HEADACHE: ICD-10-CM

## 2017-10-03 DIAGNOSIS — Z00.00 HEALTHCARE MAINTENANCE: ICD-10-CM

## 2017-10-03 DIAGNOSIS — F41.9 ANXIETY AND DEPRESSION: ICD-10-CM

## 2017-10-03 DIAGNOSIS — F32.A ANXIETY AND DEPRESSION: ICD-10-CM

## 2017-10-03 RX ORDER — FLUOXETINE HYDROCHLORIDE 60 MG/1
TABLET, FILM COATED ORAL; ORAL
Qty: 30 TABLET | Refills: 5 | Status: SHIPPED | OUTPATIENT
Start: 2017-10-03 | End: 2018-03-02 | Stop reason: SDUPTHER

## 2017-10-03 RX ORDER — FOLIC ACID 1 MG/1
TABLET ORAL
Qty: 30 TABLET | Refills: 5 | Status: SHIPPED | OUTPATIENT
Start: 2017-10-03 | End: 2018-03-02 | Stop reason: SDUPTHER

## 2017-10-03 RX ORDER — ACEBUTOLOL HYDROCHLORIDE 200 MG/1
CAPSULE ORAL
Qty: 30 CAPSULE | Refills: 5 | Status: SHIPPED | OUTPATIENT
Start: 2017-10-03 | End: 2018-03-02 | Stop reason: SDUPTHER

## 2017-10-12 DIAGNOSIS — F41.8 MIXED ANXIETY AND DEPRESSIVE DISORDER: ICD-10-CM

## 2017-10-12 RX ORDER — ALPRAZOLAM 1 MG/1
1 TABLET ORAL 2 TIMES DAILY PRN
Qty: 44 TABLET | Refills: 0 | OUTPATIENT
Start: 2017-10-12 | End: 2017-11-13 | Stop reason: SDUPTHER

## 2017-10-27 ENCOUNTER — OFFICE VISIT (OUTPATIENT)
Dept: CARDIOLOGY | Facility: CLINIC | Age: 62
End: 2017-10-27

## 2017-10-27 VITALS
HEIGHT: 64 IN | HEART RATE: 60 BPM | WEIGHT: 203 LBS | SYSTOLIC BLOOD PRESSURE: 140 MMHG | DIASTOLIC BLOOD PRESSURE: 90 MMHG | BODY MASS INDEX: 34.66 KG/M2

## 2017-10-27 DIAGNOSIS — I20.0 UNSTABLE ANGINA PECTORIS (HCC): Primary | ICD-10-CM

## 2017-10-27 DIAGNOSIS — E78.2 MIXED HYPERLIPIDEMIA: ICD-10-CM

## 2017-10-27 DIAGNOSIS — G47.33 OBSTRUCTIVE SLEEP APNEA SYNDROME: ICD-10-CM

## 2017-10-27 PROCEDURE — 93000 ELECTROCARDIOGRAM COMPLETE: CPT | Performed by: INTERNAL MEDICINE

## 2017-10-27 PROCEDURE — 99204 OFFICE O/P NEW MOD 45 MIN: CPT | Performed by: INTERNAL MEDICINE

## 2017-10-27 NOTE — PROGRESS NOTES
Date of Office Visit: 10/27/17  Encounter Provider: Sanjeev Bassett MD  Place of Service: [facility]  Patient Name: Kaci Mcgee  :1955      Chief Complaint   Patient presents with   • Chest Pain     History of Present Illness  HPI Comments: Mrs Mcgee is a pleasant 61 yo female with a history of sleep apnea, DVTs, pulmonary embolus, hyperlipidemia and abdominal atherosclerosis.  She also has history of mitral valve prolapse however when we checked her over 3-1/2 years ago her echocardiogram did not confirm mitral valve prolapse and no significant valvular disease and normal LV function that was back in .  But she now comes in because as part of an evaluation she had an abdominal CT scan that showed atherosclerosis of the abdominal aorta and iliac arteries.  Then she relates about a month ago she was awoken from sleep with a severe episode of chest pressure with shortness of breath and diaphoresis lasted about 5 minutes and resolved spontaneously over she did not seek any care at that time she also notes over the past couple months she's noted increased shortness of breath with activity.  She also gets very diaphoretic with any kind of physical stress or activity.  She's gained 40 pounds in the last year or since she was diagnosed with her thyroid issue and now on replacement.  She denies history of heart attack or heart failure.  Denies any further palpitations, near-syncope or syncope.  She was having palpitations before she was placed on her acebutolol.  She was exercising but hurt her knee and since and has not.    Chest Pain    Associated symptoms include headaches. Pertinent negatives include no abdominal pain, back pain, diaphoresis, dizziness, fever, malaise/fatigue, nausea, numbness, vomiting or weakness.   Pertinent negatives for past medical history include no muscle weakness.         Past Medical History:   Diagnosis Date   • Anxiety and depression     • Aortic sclerosis    • Asthma    • CVID (common variable immunodeficiency)    • Deep vein thrombosis     BEHIND LEFT KNEE   • Fibromyalgia    • Fibromyalgia, primary    • History of pneumonia    • Hyperlipidemia    • Hypothyroidism    • Lupus     skin   • Migraines    • MVP (mitral valve prolapse)    • Osteoarthritis    • Osteopenia    • Osteoporosis    • PONV (postoperative nausea and vomiting)    • Pulmonary embolism    • Recurrent UTI    • Sleep apnea     CPAP Machine #11         Past Surgical History:   Procedure Laterality Date   • CARDIAC CATHETERIZATION     • CHOLECYSTECTOMY     • COLONOSCOPY     • EPIDURAL BLOCK     • HYSTERECTOMY     • KNEE ARTHROSCOPY      calcium scraping   • NECK SURGERY      C 5 AND 6 TITANIUM PLATE   • PARTIAL HYSTERECTOMY     • SKIN BIOPSY      benign         Current Outpatient Prescriptions on File Prior to Visit   Medication Sig Dispense Refill   • acebutolol (SECTRAL) 200 MG capsule take 1 capsule by mouth once daily 30 capsule 5   • albuterol (PROVENTIL HFA;VENTOLIN HFA) 108 (90 BASE) MCG/ACT inhaler Inhale 2 puffs 2 (Two) Times a Day.     • ALPRAZolam (XANAX) 1 MG tablet Take 1 tablet by mouth 2 (Two) Times a Day As Needed for Anxiety. 44 tablet 0   • atorvastatin (LIPITOR) 20 MG tablet Take 20 mg by mouth Every Night.     • buPROPion SR (WELLBUTRIN SR) 150 MG 12 hr tablet Take 1 tablet by mouth 2 (Two) Times a Day. (Patient taking differently: Take 150 mg by mouth Daily.) 60 tablet 1   • butalbital-acetaminophen  MG tablet tablet Take 1 tablet by mouth 2 (Two) Times a Day As Needed (tension headaches). 60 tablet 2   • cetirizine (ZYRTEC ALLERGY) 10 MG tablet Take 10 mg by mouth daily.     • Cholecalciferol (VITAMIN D3) 2000 UNITS tablet Take 2,000 Units by mouth Daily.     • FLUoxetine (PROzac) 60 MG tablet take 1 tablet by mouth daily 30 tablet 5   • fluticasone-salmeterol (ADVAIR) 500-50 MCG/DOSE DISKUS Inhale 1 puff 2 (Two) Times a Day.     • folic acid (FOLVITE) 1  MG tablet take 1 tablet by mouth once daily 30 tablet 5   • levothyroxine (SYNTHROID, LEVOTHROID) 125 MCG tablet 1/2 tablet daily (Patient taking differently: Take 65 mcg by mouth Daily. 1/2 tablet daily) 30 tablet 3   • montelukast (SINGULAIR) 10 MG tablet Take 10 mg by mouth Every Night.     • [DISCONTINUED] Chlorhexidine Gluconate Cloth 2 % pads Apply  topically. USE AS DIRECTED FOR PRE OP     • [DISCONTINUED] FLUoxetine HCl (PROZAC PO) Take 60 mg by mouth Daily.     • [DISCONTINUED] montelukast (SINGULAIR) 10 MG tablet take 1 tablet by mouth once daily 30 tablet 5   • [DISCONTINUED] mupirocin (BACTROBAN) 2 % ointment Apply  topically. USE AS DIRECTED FOR PRE OP     • [DISCONTINUED] PROAIR  (90 Base) MCG/ACT inhaler inhale 2 puffs by mouth every 6 hours if needed 8.5 inhaler 1     No current facility-administered medications on file prior to visit.          Social History     Social History   • Marital status:      Spouse name: N/A   • Number of children: N/A   • Years of education: N/A     Occupational History   • Unemployed currently      Social History Main Topics   • Smoking status: Current Every Day Smoker     Packs/day: 1.00     Years: 30.00     Types: Cigarettes   • Smokeless tobacco: Never Used   • Alcohol use No   • Drug use: No   • Sexual activity: Yes     Partners: Male     Birth control/ protection: Post-menopausal, Other      Comment: Hysterectomy     Other Topics Concern   • Not on file     Social History Narrative       Family History   Problem Relation Age of Onset   • Thyroid disease Mother      Hypothyroid   • No Known Problems Father      N/A   • Breast cancer Sister    • Cerebral aneurysm Sister    • Thyroid cancer Cousin    • Cerebral aneurysm Sister    • Arthritis Brother    • Cancer Sister      Breast   • Depression Sister    • Thyroid disease Sister      Hyperthyroid   • Malig Hyperthermia Neg Hx          Review of Systems   Constitution: Negative for decreased appetite,  "diaphoresis, fever, weakness, malaise/fatigue, weight gain and weight loss.   HENT: Negative for congestion, hearing loss, nosebleeds and tinnitus.    Eyes: Negative for blurred vision, double vision, vision loss in left eye, vision loss in right eye and visual disturbance.   Cardiovascular:        As noted in HPI   Respiratory:        As noted HPI   Endocrine: Negative for cold intolerance and heat intolerance.   Hematologic/Lymphatic: Negative for bleeding problem. Does not bruise/bleed easily.   Skin: Negative for color change, flushing, itching and rash.   Musculoskeletal: Positive for joint pain. Negative for arthritis, back pain, joint swelling, muscle weakness and myalgias.   Gastrointestinal: Negative for bloating, abdominal pain, constipation, diarrhea, dysphagia, heartburn, hematemesis, hematochezia, melena, nausea and vomiting.   Genitourinary: Negative for bladder incontinence, dysuria, frequency, nocturia and urgency.   Neurological: Positive for headaches. Negative for dizziness, focal weakness, light-headedness, loss of balance, numbness, paresthesias and vertigo.   Psychiatric/Behavioral: Positive for depression. Negative for memory loss and substance abuse. The patient is nervous/anxious.        Procedures      ECG 12 Lead  Date/Time: 10/27/2017 2:06 PM  Performed by: RK LIPSCOMB  Authorized by: RK LIPSCOMB   Comparison: compared with previous ECG   Comparison to previous ECG: T wave abnormality anterior slightly ore pronounced.  Rhythm: sinus rhythm  Rate: normal  T depression: V1, V2 and V3  QRS axis: normal                 Objective:    /90 (BP Location: Left arm)  Pulse 60  Ht 64\" (162.6 cm)  Wt 203 lb (92.1 kg)  BMI 34.84 kg/m2       Physical Exam  Physical Exam   Constitutional: She is oriented to person, place, and time. She appears well-developed and well-nourished. No distress.   HENT:   Head: Normocephalic.   Eyes: Conjunctivae are normal. Pupils are equal, round, and " reactive to light. No scleral icterus.   Neck: Normal carotid pulses, no hepatojugular reflux and no JVD present. Carotid bruit is not present. No tracheal deviation, no edema and no erythema present. No thyromegaly present.   Cardiovascular: Normal rate, regular rhythm, S1 normal, S2 normal, normal heart sounds and intact distal pulses.   No extrasystoles are present. PMI is not displaced.  Exam reveals no gallop, no distant heart sounds and no friction rub.    No murmur heard.  Pulses:       Carotid pulses are 2+ on the right side, and 2+ on the left side.       Radial pulses are 2+ on the right side, and 2+ on the left side.        Femoral pulses are 2+ on the right side, and 2+ on the left side.       Dorsalis pedis pulses are 2+ on the right side, and 2+ on the left side.        Posterior tibial pulses are 2+ on the right side, and 2+ on the left side.   Pulmonary/Chest: Effort normal and breath sounds normal. No respiratory distress. She has no decreased breath sounds. She has no wheezes. She has no rhonchi. She has no rales. She exhibits no tenderness.   Abdominal: Soft. Bowel sounds are normal. She exhibits no distension and no mass. There is no hepatosplenomegaly. There is no tenderness. There is no rebound and no guarding.   Musculoskeletal: She exhibits no edema, tenderness or deformity.   Neurological: She is alert and oriented to person, place, and time.   Skin: Skin is warm and dry. No rash noted. She is not diaphoretic. No cyanosis or erythema. No pallor. Nails show no clubbing.   Psychiatric: She has a normal mood and affect. Her speech is normal and behavior is normal. Judgment and thought content normal.           Assessment:   1.  This is a 62-year-old female who presents with an episode of chest discomfort worrisome for unstable angina.  That combined with her atherosclerosis of her aorta certainly worrisome.  She however is only had one episode.  Her ECG is abnormal.  Her coronary disease risk  consortium score is intermediate at 10% therefore she is to return for perfusion stress test as a one-day non-walking protocol non-walking due to her inability to walk because of her knee.  If this is low risk medical therapy if intermediate to high risk will need further evaluation with catheterization.  2.  Question history of mitral valve prolapse not confirmed on follow-up echocardiogram.  In addition she has no murmur or click.  No further workup needed.  3.  Hyperlipidemia.  Her American called a cardiology risk assessment of stroke or heart attack in the next 10 years is high at 9.5% with proper risk factor modification would be 2.9%.  We spent time discussing her need to diet exercise quit cigarette smoking and optimize her cholesterol.  4.  History of obstructive sleep apnea on CPAP.  5.  History of hypothyroidism now on replacement.  6.  Remote history of DVT and pulmonary embolus is fairly was around the time of some neck surgery she's been off anticoagulation now for some time with no recurrence.          Plan:

## 2017-10-31 DIAGNOSIS — E78.5 HYPERLIPIDEMIA, UNSPECIFIED HYPERLIPIDEMIA TYPE: ICD-10-CM

## 2017-10-31 RX ORDER — ATORVASTATIN CALCIUM 20 MG/1
TABLET, FILM COATED ORAL
Qty: 30 TABLET | Refills: 2 | Status: SHIPPED | OUTPATIENT
Start: 2017-10-31 | End: 2018-01-12 | Stop reason: SDUPTHER

## 2017-11-02 ENCOUNTER — TELEPHONE (OUTPATIENT)
Dept: CARDIOLOGY | Facility: CLINIC | Age: 62
End: 2017-11-02

## 2017-11-02 ENCOUNTER — HOSPITAL ENCOUNTER (OUTPATIENT)
Dept: CARDIOLOGY | Facility: HOSPITAL | Age: 62
Discharge: HOME | End: 2017-11-02
Attending: INTERNAL MEDICINE | Admitting: INTERNAL MEDICINE

## 2017-11-02 LAB
BH CV NUCLEAR PRIOR STUDY: 2
BH CV STRESS BP STAGE 1: NORMAL
BH CV STRESS COMMENTS STAGE 1: NORMAL
BH CV STRESS DOSE REGADENOSON STAGE 1: 0.4
BH CV STRESS DURATION MIN STAGE 1: 0
BH CV STRESS DURATION SEC STAGE 1: 15
BH CV STRESS HR STAGE 1: 118
BH CV STRESS PROTOCOL 1: NORMAL
BH CV STRESS RECOVERY BP: NORMAL MMHG
BH CV STRESS RECOVERY HR: 88 BPM
BH CV STRESS STAGE 1: 1
LV EF NUC BP: 74 %
MAXIMAL PREDICTED HEART RATE: 158 BPM
PERCENT MAX PREDICTED HR: 74.68 %
STRESS BASELINE BP: NORMAL MMHG
STRESS BASELINE HR: 70 BPM
STRESS PERCENT HR: 88 %
STRESS POST EXERCISE DUR SEC: 15 SEC
STRESS POST PEAK BP: NORMAL MMHG
STRESS POST PEAK HR: 118 BPM
STRESS TARGET HR: 134 BPM

## 2017-11-02 PROCEDURE — 93018 CV STRESS TEST I&R ONLY: CPT | Performed by: INTERNAL MEDICINE

## 2017-11-02 PROCEDURE — 78452 HT MUSCLE IMAGE SPECT MULT: CPT | Performed by: INTERNAL MEDICINE

## 2017-11-02 PROCEDURE — 25010000002 REGADENOSON 0.4 MG/5ML SOLUTION: Performed by: INTERNAL MEDICINE

## 2017-11-02 PROCEDURE — 93016 CV STRESS TEST SUPVJ ONLY: CPT | Performed by: INTERNAL MEDICINE

## 2017-11-02 PROCEDURE — 78452 HT MUSCLE IMAGE SPECT MULT: CPT

## 2017-11-02 PROCEDURE — 93017 CV STRESS TEST TRACING ONLY: CPT

## 2017-11-02 PROCEDURE — A9502 TC99M TETROFOSMIN: HCPCS | Performed by: INTERNAL MEDICINE

## 2017-11-02 PROCEDURE — 0 TECHNETIUM TETROFOSMIN KIT: Performed by: INTERNAL MEDICINE

## 2017-11-02 RX ADMIN — TETROFOSMIN 1 DOSE: 1.38 INJECTION, POWDER, LYOPHILIZED, FOR SOLUTION INTRAVENOUS at 10:40

## 2017-11-02 RX ADMIN — REGADENOSON 0.4 MG: 0.08 INJECTION, SOLUTION INTRAVENOUS at 10:40

## 2017-11-02 RX ADMIN — TETROFOSMIN 1 DOSE: 1.38 INJECTION, POWDER, LYOPHILIZED, FOR SOLUTION INTRAVENOUS at 09:38

## 2017-11-02 NOTE — TELEPHONE ENCOUNTER
Kaci Mcgee A  Female, 62 y.o., 1955  PCP:   Sarwat Valenzuela MD  Language:   English  Need Interp:   No  Last Weight:   203 lb (92.1 kg)  Phone:   M: [phone] H: [phone]  Allergies  Codeine  Zofran [Ondansetron Hcl]  Health Maintenance:   Due  FYI:   None  Primary Ins.:   EFRA[employer] POP  MRN:   [id number]  MyChart:   Active  Pharmacy:   [address] [phone] [facility] [phone] FX [[id number]]  Preferred Lab:   None  Next Appt with Me:   None  Next Appt Date by Dept:   11/08/2017    Stress Test With Myocardial Perfusion One Day   Status:  Final result   Visible to patient:  No (Not Released) Dx:  Unstable angina pectoris Order: [id number]         Details        Reading Physician Reading Date Result Priority       MD Aline Herrera MD Rebecca M McFarland, MD 11/2/2017 11/2/2017 11/2/2017 Routine           Result Text             · Left ventricular ejection fraction is hyperdynamic (Calculated EF > 70%).  · Myocardial perfusion imaging indicates a normal myocardial perfusion study with no evidence of ischemia.  · Impressions are consistent with a low risk study.                    All Measurements          Ref Range & Units 11:27 AM         Nuclear Prior Study  2       BH CV STRESS PROTOCOL 1  Pharmacologic       Stage 1  1       HR Stage 1  118       BP Stage 1  180/78       Duration Min Stage 1  0       Duration Sec Stage 1  15       Stress Dose Regadenoson Stage 1  0.4       Stress Comments Stage 1  15 sec bolus injection       Baseline HR bpm 70       Baseline BP mmHg 144/80       Peak HR bpm 118       Percent Max Pred HR % 74.68       Percent Target HR % 88       Peak BP mmHg 180/78       Recovery HR bpm 88       Recovery BP mmHg 150/70       Target HR (85%) bpm 134       Max. Pred. HR (100%) bpm 158       Exercise duration (sec) sec 15       Nuc Stress EF % 74                       [facility] NUC  CARD  [address] [facility]  [address]  [facility] [id number] [phone]            Stress Data        Stage HR (bpm) BP (mmHg) Minutes Seconds Dose (mg) Comments       1        118        180/78        0        15        0.4        15 sec bolus injection                  Stress Measurements        Baseline Vitals   Baseline HR 70 bpm        Baseline /80 mmHg         Peak Stress Vitals   Peak  bpm        Peak /78 mmHg         Recovery Vitals   Recovery HR 88 bpm        Recovery /70 mmHg         Exercise Data   Target HR (85%) 134 bpm        Max. Pred. HR (100%) 158 bpm        Percent Max Pred HR 74.68 %        Exercise duration (sec) 15 sec                 Stress Procedure        Rest ECG  Baseline ECG of normal sinus rhythm noted. Non-specific ST-T wave changes noted.   NC interval = 80 ms. There was no ST segment deviation noted.       Stress ECG  Stress ECG of normal sinus rhythm noted. Normal ECG with no significant stress induced changes noted.   There was no ST segment deviation noted during stress.   There were no arrhythmias during stress.   Arrhythmias during recovery: rare PVCs.   Arrhythmias were not significant.   ECG was interpretable.   Indeterminate stress ECG interpretation.       Stress Description  A pharmacological stress test was performed using regadenoson with low-level exercise.   The patient reached the end of the protocol.   The patient reported dyspnea during the stress test. Symptoms were not clinically significant.       Stress Findings  Equivocal ECG evidence of myocardial ischemia.Indeterminate clinical evidence of myocardial ischemia.           Nuclear Perfusion Findings        Study Impression  Myocardial perfusion imaging indicates a normal myocardial perfusion study with no evidence of ischemia. Impressions are consistent with a low risk study.       Rest Perfusion Defect 1  No rest myocardial perfusion defect noted.       Stress Perfusion Defect 1  No stress  myocardial perfusion defect noted.       Ventricle Size / Description  Left ventricular ejection fraction is hyperdynamic (Calculated EF > 70%). Normal LV cavity size. Normal LV wall motion noted. RV cavity size not well visualized.           Nuclear Perfusion        Resting         The left ventricular perfusion is normal.             Stress         The left ventricular perfusion is normal.                  Perfusion Scores           Score Percentage Abnormal          SRS 0 0.00%        SSS 0 0.00%        SDS 0 0.00%                              Last Resulted: 11/02/17 12:46 PM                            Status of Other Orders        Order    Lab Status Result Date Provider Status       ECG 12 Lead Final result 10/27/2017 Open              Canceled By On Released       Stress Test With Pet Myocardial Perfusion Joselito Estrella 10/30/2017  1:23 PM 10/27/2017  2:01 PM       Reason: Per Protocol       Comment: BMI too low for PET/de                  Routing History        Priority Sent On From To Message Type        11/2/2017 12:47 PM MD Sarwat Herrera MD Results        11/2/2017 12:47 PM MD Sanjeev Herrera MD Results

## 2017-11-03 NOTE — TELEPHONE ENCOUNTER
Patient received your voicemail with test results, but she does a few questions, and asked that you call her back.    She can be reached at [phone].    Thanks!

## 2017-11-08 ENCOUNTER — OFFICE VISIT (OUTPATIENT)
Dept: SLEEP MEDICINE | Facility: HOSPITAL | Age: 62
End: 2017-11-08
Attending: INTERNAL MEDICINE

## 2017-11-08 ENCOUNTER — TELEPHONE (OUTPATIENT)
Dept: SLEEP MEDICINE | Facility: HOSPITAL | Age: 62
End: 2017-11-08

## 2017-11-08 DIAGNOSIS — G47.33 OSA ON CPAP: Primary | ICD-10-CM

## 2017-11-08 DIAGNOSIS — Z99.89 OSA ON CPAP: Primary | ICD-10-CM

## 2017-11-08 PROCEDURE — G0463 HOSPITAL OUTPT CLINIC VISIT: HCPCS

## 2017-11-08 PROCEDURE — 99213 OFFICE O/P EST LOW 20 MIN: CPT | Performed by: INTERNAL MEDICINE

## 2017-11-13 DIAGNOSIS — F41.8 MIXED ANXIETY AND DEPRESSIVE DISORDER: ICD-10-CM

## 2017-11-13 RX ORDER — ALPRAZOLAM 1 MG/1
1 TABLET ORAL 2 TIMES DAILY PRN
Qty: 36 TABLET | Refills: 0 | OUTPATIENT
Start: 2017-11-13 | End: 2017-12-12 | Stop reason: SDUPTHER

## 2017-11-30 DIAGNOSIS — J45.909 UNCOMPLICATED ASTHMA: ICD-10-CM

## 2017-12-06 DIAGNOSIS — G43.009 MIGRAINE WITHOUT AURA AND WITHOUT STATUS MIGRAINOSUS, NOT INTRACTABLE: Chronic | ICD-10-CM

## 2017-12-06 DIAGNOSIS — G44.89 CHRONIC MIXED HEADACHE SYNDROME: Chronic | ICD-10-CM

## 2017-12-06 RX ORDER — BUTALBITAL/ACETAMINOPHEN 50MG-325MG
1 TABLET ORAL 2 TIMES DAILY PRN
Qty: 60 TABLET | Refills: 0 | OUTPATIENT
Start: 2017-12-06 | End: 2018-01-03 | Stop reason: SDUPTHER

## 2017-12-12 DIAGNOSIS — F41.8 MIXED ANXIETY AND DEPRESSIVE DISORDER: ICD-10-CM

## 2017-12-12 RX ORDER — ALPRAZOLAM 1 MG/1
1 TABLET ORAL 2 TIMES DAILY PRN
Qty: 36 TABLET | Refills: 0 | OUTPATIENT
Start: 2017-12-12 | End: 2018-01-08 | Stop reason: SDUPTHER

## 2017-12-13 DIAGNOSIS — F17.219 CIGARETTE NICOTINE DEPENDENCE WITH NICOTINE-INDUCED DISORDER: ICD-10-CM

## 2017-12-13 RX ORDER — BUPROPION HYDROCHLORIDE 150 MG/1
TABLET, EXTENDED RELEASE ORAL
Qty: 60 TABLET | Refills: 1 | Status: SHIPPED | OUTPATIENT
Start: 2017-12-13 | End: 2018-04-03 | Stop reason: SDUPTHER

## 2017-12-17 NOTE — TELEPHONE ENCOUNTER
Info for s/u faxed to naps as requested.  Spoke with naps as well.  Pt to be s/u with device prior to her surgery next Thursday  
Patient/Caregiver provided printed discharge information.

## 2018-01-03 DIAGNOSIS — G43.009 MIGRAINE WITHOUT AURA AND WITHOUT STATUS MIGRAINOSUS, NOT INTRACTABLE: Chronic | ICD-10-CM

## 2018-01-03 DIAGNOSIS — G44.89 CHRONIC MIXED HEADACHE SYNDROME: Chronic | ICD-10-CM

## 2018-01-03 RX ORDER — BUTALBITAL/ACETAMINOPHEN 50MG-325MG
1 TABLET ORAL 2 TIMES DAILY PRN
Qty: 60 TABLET | Refills: 0 | OUTPATIENT
Start: 2018-01-03 | End: 2018-02-02 | Stop reason: SDUPTHER

## 2018-01-08 DIAGNOSIS — F41.8 MIXED ANXIETY AND DEPRESSIVE DISORDER: ICD-10-CM

## 2018-01-08 DIAGNOSIS — E03.8 SUBCLINICAL HYPOTHYROIDISM: ICD-10-CM

## 2018-01-08 RX ORDER — LEVOTHYROXINE SODIUM 0.12 MG/1
TABLET ORAL
Qty: 30 TABLET | Refills: 3 | Status: SHIPPED | OUTPATIENT
Start: 2018-01-08 | End: 2018-09-04 | Stop reason: SDUPTHER

## 2018-01-09 RX ORDER — ALPRAZOLAM 1 MG/1
1 TABLET ORAL 2 TIMES DAILY PRN
Qty: 36 TABLET | Refills: 0 | OUTPATIENT
Start: 2018-01-09 | End: 2018-02-02 | Stop reason: SDUPTHER

## 2018-01-12 ENCOUNTER — OFFICE VISIT (OUTPATIENT)
Dept: INTERNAL MEDICINE | Age: 63
End: 2018-01-12

## 2018-01-12 VITALS
OXYGEN SATURATION: 99 % | TEMPERATURE: 97.2 F | WEIGHT: 205 LBS | HEART RATE: 68 BPM | BODY MASS INDEX: 35 KG/M2 | HEIGHT: 64 IN | SYSTOLIC BLOOD PRESSURE: 122 MMHG | DIASTOLIC BLOOD PRESSURE: 72 MMHG

## 2018-01-12 DIAGNOSIS — F33.1 MODERATE EPISODE OF RECURRENT MAJOR DEPRESSIVE DISORDER (HCC): Primary | Chronic | ICD-10-CM

## 2018-01-12 DIAGNOSIS — F17.219 CIGARETTE NICOTINE DEPENDENCE WITH NICOTINE-INDUCED DISORDER: Chronic | ICD-10-CM

## 2018-01-12 PROCEDURE — 99214 OFFICE O/P EST MOD 30 MIN: CPT | Performed by: INTERNAL MEDICINE

## 2018-01-15 NOTE — ASSESSMENT & PLAN NOTE
Spent 40 minutes in face-to-face encounter time and >50% of time spent in counseling regarding above medical problems/issues.  Discussed need for therapy and counseling, recommended strongly that she establish with a psychiatrist. Continue fluoxetine and bupropion and minimized dependence on alprazolam. Discussed with patient's spouse who was present.

## 2018-01-18 ENCOUNTER — LAB (OUTPATIENT)
Dept: ENDOCRINOLOGY | Age: 63
End: 2018-01-18

## 2018-01-18 DIAGNOSIS — E05.90 SUBCLINICAL HYPERTHYROIDISM: ICD-10-CM

## 2018-01-18 DIAGNOSIS — E04.2 MULTINODULAR GOITER: ICD-10-CM

## 2018-01-18 DIAGNOSIS — E04.2 MULTINODULAR GOITER: Primary | ICD-10-CM

## 2018-01-18 LAB
ALBUMIN SERPL-MCNC: 4.4 G/DL (ref 3.5–5.2)
ALBUMIN/GLOB SERPL: 2 G/DL
ALP SERPL-CCNC: 190 U/L (ref 39–117)
ALT SERPL-CCNC: 20 U/L (ref 1–33)
AST SERPL-CCNC: 14 U/L (ref 1–32)
BILIRUB SERPL-MCNC: 0.2 MG/DL (ref 0.1–1.2)
BUN SERPL-MCNC: 11 MG/DL (ref 8–23)
BUN/CREAT SERPL: 13.4 (ref 7–25)
CALCIUM SERPL-MCNC: 9.4 MG/DL (ref 8.6–10.5)
CHLORIDE SERPL-SCNC: 99 MMOL/L (ref 98–107)
CO2 SERPL-SCNC: 26.1 MMOL/L (ref 22–29)
CREAT SERPL-MCNC: 0.82 MG/DL (ref 0.57–1)
GLOBULIN SER CALC-MCNC: 2.2 GM/DL
GLUCOSE SERPL-MCNC: 112 MG/DL (ref 65–99)
POTASSIUM SERPL-SCNC: 3.9 MMOL/L (ref 3.5–5.2)
PROT SERPL-MCNC: 6.6 G/DL (ref 6–8.5)
SODIUM SERPL-SCNC: 139 MMOL/L (ref 136–145)
T4 FREE SERPL-MCNC: 0.94 NG/DL (ref 0.93–1.7)
TSH SERPL DL<=0.005 MIU/L-ACNC: 0.49 MIU/ML (ref 0.27–4.2)

## 2018-01-30 DIAGNOSIS — E78.5 HYPERLIPIDEMIA, UNSPECIFIED HYPERLIPIDEMIA TYPE: ICD-10-CM

## 2018-01-30 DIAGNOSIS — Z00.00 HEALTHCARE MAINTENANCE: ICD-10-CM

## 2018-01-31 DIAGNOSIS — Z00.00 HEALTHCARE MAINTENANCE: ICD-10-CM

## 2018-01-31 DIAGNOSIS — J45.909 ASTHMATIC BRONCHITIS WITHOUT COMPLICATION, UNSPECIFIED ASTHMA SEVERITY, UNSPECIFIED WHETHER PERSISTENT: Primary | Chronic | ICD-10-CM

## 2018-01-31 RX ORDER — ATORVASTATIN CALCIUM 20 MG/1
TABLET, FILM COATED ORAL
Qty: 30 TABLET | Refills: 2 | Status: SHIPPED | OUTPATIENT
Start: 2018-01-31 | End: 2018-03-02 | Stop reason: SDUPTHER

## 2018-02-02 DIAGNOSIS — G44.89 CHRONIC MIXED HEADACHE SYNDROME: Chronic | ICD-10-CM

## 2018-02-02 DIAGNOSIS — G43.009 MIGRAINE WITHOUT AURA AND WITHOUT STATUS MIGRAINOSUS, NOT INTRACTABLE: Chronic | ICD-10-CM

## 2018-02-02 DIAGNOSIS — F41.8 MIXED ANXIETY AND DEPRESSIVE DISORDER: ICD-10-CM

## 2018-02-02 RX ORDER — ALPRAZOLAM 1 MG/1
1 TABLET ORAL 2 TIMES DAILY PRN
Qty: 36 TABLET | Refills: 0 | OUTPATIENT
Start: 2018-02-02 | End: 2018-03-02 | Stop reason: SDUPTHER

## 2018-02-02 RX ORDER — BUTALBITAL/ACETAMINOPHEN 50MG-325MG
1 TABLET ORAL 2 TIMES DAILY PRN
Qty: 60 TABLET | Refills: 0 | OUTPATIENT
Start: 2018-02-02 | End: 2018-03-02 | Stop reason: SDUPTHER

## 2018-02-02 NOTE — TELEPHONE ENCOUNTER
Okay to refill fioricet and alprazolam x 1 month; will have  send a letter of 30 day notice that she is transferring out.

## 2018-02-03 DIAGNOSIS — Z00.00 HEALTHCARE MAINTENANCE: ICD-10-CM

## 2018-02-28 ENCOUNTER — OFFICE VISIT (OUTPATIENT)
Dept: FAMILY MEDICINE CLINIC | Facility: CLINIC | Age: 63
End: 2018-02-28

## 2018-02-28 VITALS
SYSTOLIC BLOOD PRESSURE: 128 MMHG | HEIGHT: 64 IN | WEIGHT: 205 LBS | HEART RATE: 61 BPM | RESPIRATION RATE: 15 BRPM | DIASTOLIC BLOOD PRESSURE: 82 MMHG | BODY MASS INDEX: 35 KG/M2 | OXYGEN SATURATION: 98 %

## 2018-02-28 DIAGNOSIS — M79.642 PAIN IN BOTH HANDS: ICD-10-CM

## 2018-02-28 DIAGNOSIS — E03.9 HYPOTHYROIDISM (ACQUIRED): ICD-10-CM

## 2018-02-28 DIAGNOSIS — F41.9 ANXIETY: ICD-10-CM

## 2018-02-28 DIAGNOSIS — J45.909 ASTHMATIC BRONCHITIS WITHOUT COMPLICATION, UNSPECIFIED ASTHMA SEVERITY, UNSPECIFIED WHETHER PERSISTENT: Chronic | ICD-10-CM

## 2018-02-28 DIAGNOSIS — R76.8 ANA POSITIVE: Primary | ICD-10-CM

## 2018-02-28 DIAGNOSIS — J45.20 MILD INTERMITTENT ASTHMA WITHOUT COMPLICATION: ICD-10-CM

## 2018-02-28 DIAGNOSIS — D83.9 CVID (COMMON VARIABLE IMMUNODEFICIENCY) (HCC): ICD-10-CM

## 2018-02-28 DIAGNOSIS — E78.5 HYPERLIPIDEMIA, UNSPECIFIED HYPERLIPIDEMIA TYPE: ICD-10-CM

## 2018-02-28 DIAGNOSIS — M79.641 PAIN IN BOTH HANDS: ICD-10-CM

## 2018-02-28 DIAGNOSIS — R73.9 ELEVATED SERUM GLUCOSE: ICD-10-CM

## 2018-02-28 PROBLEM — K76.0 FATTY LIVER: Status: ACTIVE | Noted: 2018-02-28

## 2018-02-28 PROBLEM — Z72.0 TOBACCO ABUSE: Status: ACTIVE | Noted: 2018-02-28

## 2018-02-28 PROBLEM — Z86.711 HISTORY OF PULMONARY EMBOLISM: Status: ACTIVE | Noted: 2018-02-28

## 2018-02-28 PROBLEM — Z82.49 FAMILY HISTORY OF DVT: Status: ACTIVE | Noted: 2018-02-28

## 2018-02-28 PROBLEM — M81.0 OSTEOPOROSIS: Status: ACTIVE | Noted: 2018-02-28

## 2018-02-28 PROBLEM — L93.2 CUTANEOUS LUPUS ERYTHEMATOSUS: Status: ACTIVE | Noted: 2018-02-28

## 2018-02-28 PROCEDURE — 99214 OFFICE O/P EST MOD 30 MIN: CPT | Performed by: FAMILY MEDICINE

## 2018-03-01 LAB
BASOPHILS # BLD AUTO: 0.02 10*3/MM3 (ref 0–0.2)
BASOPHILS NFR BLD AUTO: 0.4 % (ref 0–1.5)
CHOLEST SERPL-MCNC: 183 MG/DL (ref 0–200)
EOSINOPHIL # BLD AUTO: 0.06 10*3/MM3 (ref 0–0.7)
EOSINOPHIL NFR BLD AUTO: 1.1 % (ref 0.3–6.2)
ERYTHROCYTE [DISTWIDTH] IN BLOOD BY AUTOMATED COUNT: 13.2 % (ref 11.7–13)
HBA1C MFR BLD: 5.28 % (ref 4.8–5.6)
HCT VFR BLD AUTO: 40.3 % (ref 35.6–45.5)
HDLC SERPL-MCNC: 43 MG/DL (ref 40–60)
HGB BLD-MCNC: 13.1 G/DL (ref 11.9–15.5)
IMM GRANULOCYTES # BLD: 0.02 10*3/MM3 (ref 0–0.03)
IMM GRANULOCYTES NFR BLD: 0.4 % (ref 0–0.5)
LDLC SERPL CALC-MCNC: 116 MG/DL (ref 0–100)
LDLC/HDLC SERPL: 2.69 {RATIO}
LYMPHOCYTES # BLD AUTO: 1.91 10*3/MM3 (ref 0.9–4.8)
LYMPHOCYTES NFR BLD AUTO: 35 % (ref 19.6–45.3)
MCH RBC QN AUTO: 31.8 PG (ref 26.9–32)
MCHC RBC AUTO-ENTMCNC: 32.5 G/DL (ref 32.4–36.3)
MCV RBC AUTO: 97.8 FL (ref 80.5–98.2)
MONOCYTES # BLD AUTO: 0.61 10*3/MM3 (ref 0.2–1.2)
MONOCYTES NFR BLD AUTO: 11.2 % (ref 5–12)
NEUTROPHILS # BLD AUTO: 2.83 10*3/MM3 (ref 1.9–8.1)
NEUTROPHILS NFR BLD AUTO: 51.9 % (ref 42.7–76)
PLATELET # BLD AUTO: 220 10*3/MM3 (ref 140–500)
RBC # BLD AUTO: 4.12 10*6/MM3 (ref 3.9–5.2)
TRIGL SERPL-MCNC: 122 MG/DL (ref 0–150)
VLDLC SERPL CALC-MCNC: 24.4 MG/DL (ref 5–40)
WBC # BLD AUTO: 5.45 10*3/MM3 (ref 4.5–10.7)

## 2018-03-02 ENCOUNTER — OFFICE VISIT (OUTPATIENT)
Dept: FAMILY MEDICINE CLINIC | Facility: CLINIC | Age: 63
End: 2018-03-02

## 2018-03-02 VITALS
RESPIRATION RATE: 14 BRPM | BODY MASS INDEX: 35 KG/M2 | HEART RATE: 68 BPM | OXYGEN SATURATION: 98 % | SYSTOLIC BLOOD PRESSURE: 132 MMHG | HEIGHT: 64 IN | DIASTOLIC BLOOD PRESSURE: 84 MMHG | WEIGHT: 205 LBS

## 2018-03-02 DIAGNOSIS — G43.009 MIGRAINE WITHOUT AURA AND WITHOUT STATUS MIGRAINOSUS, NOT INTRACTABLE: Chronic | ICD-10-CM

## 2018-03-02 DIAGNOSIS — E78.5 HYPERLIPIDEMIA, UNSPECIFIED HYPERLIPIDEMIA TYPE: ICD-10-CM

## 2018-03-02 DIAGNOSIS — Z00.00 HEALTHCARE MAINTENANCE: ICD-10-CM

## 2018-03-02 DIAGNOSIS — F32.A ANXIETY AND DEPRESSION: ICD-10-CM

## 2018-03-02 DIAGNOSIS — F41.9 ANXIETY AND DEPRESSION: ICD-10-CM

## 2018-03-02 DIAGNOSIS — R51.9 CHRONIC DAILY HEADACHE: ICD-10-CM

## 2018-03-02 DIAGNOSIS — F41.8 MIXED ANXIETY AND DEPRESSIVE DISORDER: ICD-10-CM

## 2018-03-02 PROCEDURE — 99213 OFFICE O/P EST LOW 20 MIN: CPT | Performed by: FAMILY MEDICINE

## 2018-03-02 RX ORDER — ACEBUTOLOL HYDROCHLORIDE 200 MG/1
200 CAPSULE ORAL DAILY
Qty: 30 CAPSULE | Refills: 11 | Status: SHIPPED | OUTPATIENT
Start: 2018-03-02 | End: 2019-03-01 | Stop reason: SDUPTHER

## 2018-03-02 RX ORDER — ATORVASTATIN CALCIUM 20 MG/1
20 TABLET, FILM COATED ORAL NIGHTLY
Qty: 30 TABLET | Refills: 11 | Status: SHIPPED | OUTPATIENT
Start: 2018-03-02 | End: 2019-02-25 | Stop reason: SDUPTHER

## 2018-03-02 RX ORDER — ALPRAZOLAM 1 MG/1
1 TABLET ORAL 2 TIMES DAILY PRN
Qty: 60 TABLET | Refills: 0 | Status: SHIPPED | OUTPATIENT
Start: 2018-03-02 | End: 2018-03-29 | Stop reason: SDUPTHER

## 2018-03-02 RX ORDER — FLUOXETINE HYDROCHLORIDE 60 MG/1
60 TABLET, FILM COATED ORAL; ORAL DAILY
Qty: 30 TABLET | Refills: 5 | Status: SHIPPED | OUTPATIENT
Start: 2018-03-02 | End: 2018-08-29 | Stop reason: SDUPTHER

## 2018-03-02 RX ORDER — BUTALBITAL/ACETAMINOPHEN 50MG-325MG
1 TABLET ORAL 2 TIMES DAILY PRN
Qty: 60 TABLET | Refills: 0 | Status: SHIPPED | OUTPATIENT
Start: 2018-03-02 | End: 2018-03-29 | Stop reason: SDUPTHER

## 2018-03-02 RX ORDER — FOLIC ACID 1 MG/1
1 TABLET ORAL DAILY
Qty: 30 TABLET | Refills: 5 | Status: SHIPPED | OUTPATIENT
Start: 2018-03-02 | End: 2018-08-29 | Stop reason: SDUPTHER

## 2018-03-02 NOTE — PROGRESS NOTES
Ida Mcgee is a 62 y.o. female.     Chief Complaint   Patient presents with   • Med Refill   • Anxiety   • Hyperlipidemia       HPI     Anxiety:   -chronic problem for over 20 yr   -mood stable on current regimen of fluoxetine, bupoprion, and alprazolam, which she is reluctant to change  -she declines referral for counseling or psychiatric consult at this time   -she denies any hx of SI, HI, self harm, or substance abuse    Hyperlipidemia:  -recent lipid panel dated 3/1/18 reviewed: LDL elevated at 116 but remainder of panel WNL  -pt has been taking atorvastatin 20 mg daily for several years and tolerating it well; she is reluctant to increase the dose    Refills needed on several of her chronic medications       Review of Systems   Constitutional: Positive for fatigue. Negative for fever.   HENT: Negative for congestion and sore throat.    Respiratory: Negative for cough and shortness of breath.    Cardiovascular: Negative for chest pain and palpitations.   Gastrointestinal: Negative for abdominal pain and nausea.   Psychiatric/Behavioral: Negative for confusion, decreased concentration, dysphoric mood, self-injury, sleep disturbance and suicidal ideas. The patient is not nervous/anxious.        The following portions of the patient's history were reviewed and updated as appropriate: allergies, current medications, past family history, past medical history, past social history, past surgical history and problem list.    Past Medical History:   Diagnosis Date   • Anxiety    • Anxiety and depression    • Aortic sclerosis     followed by Dr. Smith, Vascular Surgery   • Asthma     mild intermittent   • CVID (common variable immunodeficiency)     started to have a bad reaction to IVIG after several years   • Deep vein thrombosis     BEHIND LEFT KNEE   • Depression    • Elevated alkaline phosphatase level     chronic, due to CVID   • Fibromyalgia    • History of pneumonia    • Hyperlipidemia    •  Hypothyroidism     followed by Endocrinology, Dr. Kuhn   • Lupus     skin   • Migraines    • MVP (mitral valve prolapse)     followed by Cardiology, Dr. Bassett   • Osteoarthritis     followed by [facility]   • Osteoporosis    • PONV (postoperative nausea and vomiting)    • Pulmonary embolism 1998    x 4 after neck surgery, took coumadin x 10 yr but did not tolerate it   • Recurrent UTI    • Sleep apnea     CPAP Machine #11       Family History   Problem Relation Age of Onset   • Thyroid disease Mother      Hypothyroid   • ALS Mother    • Anxiety disorder Mother    • No Known Problems Father      N/A   • Breast cancer Sister 50   • Cerebral aneurysm Sister    • Depression Sister    • Thyroid cancer Cousin    • Cerebral aneurysm Sister    • Depression Sister    • Arthritis Brother    • Cancer Sister      Breast   • Depression Sister    • Depression Sister    • Thyroid disease Sister      Hyperthyroid   • Depression Sister    • Psychosis Maternal Grandfather    • Depression Daughter    • Malig Hyperthermia Neg Hx      Social History     Social History   • Marital status:      Social History Main Topics   • Smoking status: Current Every Day Smoker     Packs/day: 1.00     Years: 30.00     Types: Cigarettes   • Smokeless tobacco: Never Used   • Alcohol use No   • Drug use: No          Allergies   Allergen Reactions   • Codeine Nausea And Vomiting   • Zofran [Ondansetron Hcl] Other (See Comments)     CONSTIPATION        Outpatient Medications Prior to Visit   Medication Sig Dispense Refill   • ADVAIR DISKUS 500-50 MCG/DOSE DISKUS inhale 1 dose by mouth twice a day 60 each 0   • aspirin 81 MG chewable tablet Chew 81 mg Daily.     • buPROPion SR (WELLBUTRIN SR) 150 MG 12 hr tablet take 1 tablet by mouth twice a day 60 tablet 1   • cetirizine (ZYRTEC ALLERGY) 10 MG tablet Take 10 mg by mouth daily.     • Cholecalciferol (VITAMIN D3) 2000 UNITS tablet Take 2,000 Units by mouth Daily.     •  Cranberry 300 MG tablet Take 300 mg by mouth Daily.     • levothyroxine (SYNTHROID, LEVOTHROID) 125 MCG tablet take 1/2 tablet by mouth once daily 30 tablet 3   • montelukast (SINGULAIR) 10 MG tablet Take 10 mg by mouth Every Night.     • PROAIR  (90 Base) MCG/ACT inhaler inhale 2 puffs by mouth every 6 hours if needed 8.5 inhaler 1   • acebutolol (SECTRAL) 200 MG capsule take 1 capsule by mouth once daily 30 capsule 5   • ALPRAZolam (XANAX) 1 MG tablet Take 1 tablet by mouth 2 (Two) Times a Day As Needed for Anxiety. 36 tablet 0   • atorvastatin (LIPITOR) 20 MG tablet take 1 tablet by mouth once daily 30 tablet 2   • butalbital-acetaminophen  MG tablet tablet Take 1 tablet by mouth 2 (Two) Times a Day As Needed (tension headaches). 60 tablet 0   • FLUoxetine (PROzac) 60 MG tablet take 1 tablet by mouth daily 30 tablet 5   • folic acid (FOLVITE) 1 MG tablet take 1 tablet by mouth once daily 30 tablet 5     No facility-administered medications prior to visit.        Objective     Vitals:    03/02/18 1040   BP: 132/84   Pulse: 68   Resp: 14   SpO2: 98%       Physical Exam   Constitutional: She appears well-developed and well-nourished. No distress.   HENT:   Head: Normocephalic and atraumatic.   Cardiovascular: Normal rate, regular rhythm and normal heart sounds.  Exam reveals no gallop and no friction rub.    No murmur heard.  Pulmonary/Chest: Effort normal and breath sounds normal. No respiratory distress. She has no wheezes. She has no rhonchi. She has no rales.   Abdominal: Soft. Bowel sounds are normal. She exhibits no distension. There is no tenderness.   Skin: Skin is warm and dry.   Psychiatric: Her speech is normal and behavior is normal. Her mood appears anxious.       ASSESSMENT/PLAN       Problem List Items Addressed This Visit        Cardiovascular and Mediastinum    Migraine without aura and without status migrainosus, not intractable (Chronic)    Relfill    acebutolol (SECTRAL) 200 MG  capsule    butalbital-acetaminophen  MG tablet tablet  (pt has not tolerated triptans and topamax in the past)        Hyperlipidemia (Chronic)    Relevant Medications    Pt advised to increase  atorvastatin (LIPITOR) from 20 to 40 mg daily, but she declines  Therefore pt advised to continue 20 mg daily and work on diet and exercise modifications        Healthcare maintenance        Refill    folic acid (FOLVITE) 1 MG tablet    Anxiety and depression        Refill    FLUoxetine (PROzac) 60 MG tablet    ALPRAZolam (XANAX) 1 MG tablet  GOYO reviewed and consistent with reported history.    Urine drug screen appropriate as of 9/12/17.    Controlled substance contract completed and signed.  Pt declines referral to Psychiatry at this time  Pt advised to try to taper alprazolam from BID to QHS            Return in about 3 months (around 6/2/2018).      Latanya Hickman MD  03/02/18

## 2018-03-11 RX ORDER — CLINDAMYCIN HYDROCHLORIDE 150 MG/1
CAPSULE ORAL
Refills: 0 | COMMUNITY
Start: 2018-02-20 | End: 2018-05-10

## 2018-03-11 RX ORDER — ESTRADIOL 0.1 MG/G
CREAM VAGINAL
Refills: 1 | COMMUNITY
Start: 2017-12-08 | End: 2018-06-04

## 2018-03-12 DIAGNOSIS — M25.541 ARTHRALGIA OF BOTH HANDS: Primary | ICD-10-CM

## 2018-03-12 DIAGNOSIS — M25.561 CHRONIC PAIN OF BOTH KNEES: ICD-10-CM

## 2018-03-12 DIAGNOSIS — M25.562 CHRONIC PAIN OF BOTH KNEES: ICD-10-CM

## 2018-03-12 DIAGNOSIS — R76.8 ANA POSITIVE: ICD-10-CM

## 2018-03-12 DIAGNOSIS — M25.542 ARTHRALGIA OF BOTH HANDS: Primary | ICD-10-CM

## 2018-03-12 DIAGNOSIS — G89.29 CHRONIC PAIN OF BOTH KNEES: ICD-10-CM

## 2018-03-23 DIAGNOSIS — Z88.9 HISTORY OF MULTIPLE ALLERGIES: ICD-10-CM

## 2018-03-23 DIAGNOSIS — J45.909 UNCOMPLICATED ASTHMA, UNSPECIFIED ASTHMA SEVERITY, UNSPECIFIED WHETHER PERSISTENT: ICD-10-CM

## 2018-03-23 RX ORDER — MONTELUKAST SODIUM 10 MG/1
TABLET ORAL
Qty: 30 TABLET | Refills: 5 | Status: SHIPPED | OUTPATIENT
Start: 2018-03-23 | End: 2018-05-10 | Stop reason: SDUPTHER

## 2018-03-23 RX ORDER — ALBUTEROL SULFATE 90 UG/1
2 AEROSOL, METERED RESPIRATORY (INHALATION)
Qty: 8.5 INHALER | Refills: 1 | Status: SHIPPED | OUTPATIENT
Start: 2018-03-23 | End: 2018-06-06 | Stop reason: SDUPTHER

## 2018-03-29 DIAGNOSIS — F17.219 CIGARETTE NICOTINE DEPENDENCE WITH NICOTINE-INDUCED DISORDER: ICD-10-CM

## 2018-03-29 DIAGNOSIS — G43.009 MIGRAINE WITHOUT AURA AND WITHOUT STATUS MIGRAINOSUS, NOT INTRACTABLE: Chronic | ICD-10-CM

## 2018-03-29 DIAGNOSIS — F41.8 MIXED ANXIETY AND DEPRESSIVE DISORDER: ICD-10-CM

## 2018-03-29 RX ORDER — BUPROPION HYDROCHLORIDE 150 MG/1
TABLET, EXTENDED RELEASE ORAL
Qty: 60 TABLET | Refills: 1 | OUTPATIENT
Start: 2018-03-29

## 2018-04-02 RX ORDER — ALPRAZOLAM 1 MG/1
TABLET ORAL
Qty: 60 TABLET | Refills: 0 | Status: SHIPPED | OUTPATIENT
Start: 2018-04-02 | End: 2018-05-21 | Stop reason: SDUPTHER

## 2018-04-02 RX ORDER — BUTALBITAL/ACETAMINOPHEN 50MG-325MG
TABLET ORAL
Qty: 60 TABLET | Refills: 0 | Status: SHIPPED | OUTPATIENT
Start: 2018-04-02 | End: 2018-05-01 | Stop reason: SDUPTHER

## 2018-04-03 DIAGNOSIS — Z00.00 HEALTHCARE MAINTENANCE: ICD-10-CM

## 2018-04-03 DIAGNOSIS — F17.219 CIGARETTE NICOTINE DEPENDENCE WITH NICOTINE-INDUCED DISORDER: ICD-10-CM

## 2018-04-03 RX ORDER — BUPROPION HYDROCHLORIDE 150 MG/1
150 TABLET, EXTENDED RELEASE ORAL 2 TIMES DAILY
Qty: 60 TABLET | Refills: 1 | Status: SHIPPED | OUTPATIENT
Start: 2018-04-03 | End: 2018-07-24 | Stop reason: SDUPTHER

## 2018-05-01 DIAGNOSIS — G43.009 MIGRAINE WITHOUT AURA AND WITHOUT STATUS MIGRAINOSUS, NOT INTRACTABLE: Chronic | ICD-10-CM

## 2018-05-02 RX ORDER — BUTALBITAL/ACETAMINOPHEN 50MG-325MG
TABLET ORAL
Qty: 60 TABLET | Refills: 0 | Status: SHIPPED | OUTPATIENT
Start: 2018-05-02 | End: 2018-05-30 | Stop reason: SDUPTHER

## 2018-05-10 ENCOUNTER — CONSULT (OUTPATIENT)
Dept: ONCOLOGY | Facility: CLINIC | Age: 63
End: 2018-05-10

## 2018-05-10 ENCOUNTER — LAB (OUTPATIENT)
Dept: LAB | Facility: HOSPITAL | Age: 63
End: 2018-05-10

## 2018-05-10 VITALS
SYSTOLIC BLOOD PRESSURE: 136 MMHG | RESPIRATION RATE: 18 BRPM | OXYGEN SATURATION: 97 % | BODY MASS INDEX: 36.98 KG/M2 | WEIGHT: 216.6 LBS | TEMPERATURE: 98 F | HEIGHT: 64 IN | HEART RATE: 61 BPM | DIASTOLIC BLOOD PRESSURE: 74 MMHG

## 2018-05-10 DIAGNOSIS — Z86.718 HISTORY OF THROMBOSIS: Primary | ICD-10-CM

## 2018-05-10 DIAGNOSIS — L93.2 CUTANEOUS LUPUS ERYTHEMATOSUS: ICD-10-CM

## 2018-05-10 DIAGNOSIS — R76.8 ANA POSITIVE: Primary | ICD-10-CM

## 2018-05-10 DIAGNOSIS — D83.9 CVID (COMMON VARIABLE IMMUNODEFICIENCY) (HCC): ICD-10-CM

## 2018-05-10 LAB
BASOPHILS # BLD AUTO: 0.04 10*3/MM3 (ref 0–0.1)
BASOPHILS NFR BLD AUTO: 0.8 % (ref 0–1.1)
DEPRECATED RDW RBC AUTO: 42.8 FL (ref 37–49)
EOSINOPHIL # BLD AUTO: 0.07 10*3/MM3 (ref 0–0.36)
EOSINOPHIL NFR BLD AUTO: 1.4 % (ref 1–5)
ERYTHROCYTE [DISTWIDTH] IN BLOOD BY AUTOMATED COUNT: 12.4 % (ref 11.7–14.5)
F5 GENE MUT ANL BLD/T: NORMAL
FACTOR II, DNA ANALYSIS: NORMAL
HCT VFR BLD AUTO: 41.1 % (ref 34–45)
HCV AB SER DONR QL: NORMAL
HGB BLD-MCNC: 14 G/DL (ref 11.5–14.9)
IMM GRANULOCYTES # BLD: 0.02 10*3/MM3 (ref 0–0.03)
IMM GRANULOCYTES NFR BLD: 0.4 % (ref 0–0.5)
LYMPHOCYTES # BLD AUTO: 1.82 10*3/MM3 (ref 1–3.5)
LYMPHOCYTES NFR BLD AUTO: 36.8 % (ref 20–49)
MCH RBC QN AUTO: 32 PG (ref 27–33)
MCHC RBC AUTO-ENTMCNC: 34.1 G/DL (ref 32–35)
MCV RBC AUTO: 94.1 FL (ref 83–97)
MONOCYTES # BLD AUTO: 0.57 10*3/MM3 (ref 0.25–0.8)
MONOCYTES NFR BLD AUTO: 11.5 % (ref 4–12)
NEUTROPHILS # BLD AUTO: 2.42 10*3/MM3 (ref 1.5–7)
NEUTROPHILS NFR BLD AUTO: 49.1 % (ref 39–75)
NRBC BLD MANUAL-RTO: 0 /100 WBC (ref 0–0)
PLATELET # BLD AUTO: 184 10*3/MM3 (ref 150–375)
PMV BLD AUTO: 9.3 FL (ref 8.9–12.1)
RBC # BLD AUTO: 4.37 10*6/MM3 (ref 3.9–5)
WBC NRBC COR # BLD: 4.94 10*3/MM3 (ref 4–10)

## 2018-05-10 PROCEDURE — 85306 CLOT INHIBIT PROT S FREE: CPT | Performed by: INTERNAL MEDICINE

## 2018-05-10 PROCEDURE — 81240 F2 GENE: CPT | Performed by: INTERNAL MEDICINE

## 2018-05-10 PROCEDURE — 36415 COLL VENOUS BLD VENIPUNCTURE: CPT | Performed by: INTERNAL MEDICINE

## 2018-05-10 PROCEDURE — 85303 CLOT INHIBIT PROT C ACTIVITY: CPT | Performed by: INTERNAL MEDICINE

## 2018-05-10 PROCEDURE — 36416 COLLJ CAPILLARY BLOOD SPEC: CPT | Performed by: INTERNAL MEDICINE

## 2018-05-10 PROCEDURE — 86803 HEPATITIS C AB TEST: CPT | Performed by: INTERNAL MEDICINE

## 2018-05-10 PROCEDURE — 81241 F5 GENE: CPT | Performed by: INTERNAL MEDICINE

## 2018-05-10 PROCEDURE — 99245 OFF/OP CONSLTJ NEW/EST HI 55: CPT | Performed by: INTERNAL MEDICINE

## 2018-05-10 PROCEDURE — 85025 COMPLETE CBC W/AUTO DIFF WBC: CPT | Performed by: INTERNAL MEDICINE

## 2018-05-10 RX ORDER — HYDROXYCHLOROQUINE SULFATE 200 MG/1
TABLET, FILM COATED ORAL
COMMUNITY
Start: 2018-01-20 | End: 2019-04-09

## 2018-05-10 NOTE — PROGRESS NOTES
Subjective     REASON FOR CONSULTATION: Positive anticardiolipin antibodies  Provide an opinion on any further workup or treatment                             REQUESTING PHYSICIAN:  Kelly Cabrera M.D.    RECORDS OBTAINED:  Records of the patients history including those obtained from the referring provider were reviewed and summarized in detail.    HISTORY OF PRESENT ILLNESS:  The patient is a 62 y.o. year old female who is here for an opinion about the above issue.    History of Present Illness patient is a 62-year-old female with a 1 year history of a generalized arthralgia presenting initially with pseudogout in the left knee which was debilitating and for 3 months she was wheelchair-bound because of pain and immobility.  Symptoms quickly improved but then she has had arthralgias in her fingers and a lot of myalgias and was referred to Dr. Cabrera for evaluation because when she was at the [facility] being evaluated for recurrent UTIs 1 year ago she was found to have positive SIRI and RNP antibodies suspicious for underlying rheumatological illness.  She's been on plaquenil now for about a month for an unclassifiable rheumatological illness but also has a history of cutaneous lupus diagnosed 15 years ago    The patient has a complicated past history including diagnosis of common variable immunodeficiency in 2005.  She took IV IgG for a year but could not tolerate it and stopped in 1998 she had C-spine surgery with a plate in her neck and postoperatively developed DVT and PE and is better on Coumadin which she stayed on for 7 years and then discontinued in 2005 when her INR was extremely long and the risk of bleeding was felt to outweigh the risk of clotting.  She does not remember being told she had any hypercoagulable state and she saw Dr. Rodgers at that time for hematology.  She has never had a recurrence of her DVT since 2005 and was started on low-dose vaginal estrogen in the [facility] for her recurrent  UTIs and she uses a 3 times a week and has had no problems.  She has no family history of DVTs and has a sister with breast cancer at age 57.        Past Medical History:   Diagnosis Date   • Anxiety    • Anxiety and depression    • Aortic sclerosis     followed by Dr. Smith, Vascular Surgery   • Asthma     mild intermittent   • CVID (common variable immunodeficiency)     started to have a bad reaction to IVIG after several years   • Deep vein thrombosis     BEHIND LEFT KNEE   • Depression    • Elevated alkaline phosphatase level     chronic, due to CVID   • Fibromyalgia    • History of pneumonia    • Hyperlipidemia    • Hypothyroidism     followed by Endocrinology, Dr. Kuhn   • Lupus     skin   • Migraines    • Multinodular goiter    • MVP (mitral valve prolapse)     followed by Cardiology, Dr. Bassett   • Osteoarthritis     followed by [facility]   • Osteoporosis    • PONV (postoperative nausea and vomiting)    • Prediabetes    • Pulmonary embolism 1998    x 4 after neck surgery, took coumadin x 10 yr but did not tolerate it   • Recurrent UTI    • Sleep apnea     CPAP Machine #11   • Tobacco abuse         Past Surgical History:   Procedure Laterality Date   • CARDIAC CATHETERIZATION     • CHOLECYSTECTOMY     • COLONOSCOPY     • EPIDURAL BLOCK     • HYSTERECTOMY      complete at age 38, endometriosis   • KNEE ARTHROSCOPY      calcium scraping   • NECK SURGERY      C 5 AND 6 TITANIUM PLATE   • PARTIAL HYSTERECTOMY     • SKIN BIOPSY      benign        Current Outpatient Prescriptions on File Prior to Visit   Medication Sig Dispense Refill   • acebutolol (SECTRAL) 200 MG capsule Take 1 capsule by mouth Daily. 30 capsule 11   • albuterol (PROAIR HFA) 108 (90 Base) MCG/ACT inhaler Inhale 2 puffs 4 (Four) Times a Day. 8.5 inhaler 1   • ALPRAZolam (XANAX) 1 MG tablet take 1 tablet by mouth twice a day if needed for anxiety 60 tablet 0   • aspirin 81 MG chewable tablet Chew 81 mg Daily.     •  atorvastatin (LIPITOR) 20 MG tablet Take 1 tablet by mouth Every Night. 30 tablet 11   • buPROPion SR (WELLBUTRIN SR) 150 MG 12 hr tablet Take 1 tablet by mouth 2 (Two) Times a Day. 60 tablet 1   • butalbital-acetaminophen  MG tablet tablet take 1 tablet by mouth twice a day if needed for TENSION headache 60 tablet 0   • cetirizine (ZYRTEC ALLERGY) 10 MG tablet Take 10 mg by mouth daily.     • Cholecalciferol (VITAMIN D3) 2000 UNITS tablet Take 2,000 Units by mouth Daily.     • Cranberry 300 MG tablet Take 300 mg by mouth Daily.     • ESTRACE VAGINAL 0.1 MG/GM vaginal cream insert 1 gram vaginally TWO TO three times a week  1   • FLUoxetine (PROzac) 60 MG tablet Take 1 tablet by mouth Daily. 30 tablet 5   • fluticasone-salmeterol (ADVAIR DISKUS) 500-50 MCG/DOSE DISKUS Inhale 1 puff 2 (Two) Times a Day. 60 each 1   • folic acid (FOLVITE) 1 MG tablet Take 1 tablet by mouth Daily. 30 tablet 5   • levothyroxine (SYNTHROID, LEVOTHROID) 125 MCG tablet take 1/2 tablet by mouth once daily (Patient taking differently: 65mg) 30 tablet 3   • montelukast (SINGULAIR) 10 MG tablet Take 10 mg by mouth Every Night.     • [DISCONTINUED] clindamycin (CLEOCIN) 150 MG capsule take 2 tablets by mouth NOW then 1 tablet by mouth four times a day until finished  0   • [DISCONTINUED] montelukast (SINGULAIR) 10 MG tablet take 1 tablet by mouth once daily 30 tablet 5     No current facility-administered medications on file prior to visit.         ALLERGIES:    Allergies   Allergen Reactions   • Codeine Nausea And Vomiting   • Zofran [Ondansetron Hcl] Other (See Comments)     CONSTIPATION        Social History     Social History   • Marital status:      Occupational History   • Unemployed currently      Social History Main Topics   • Smoking status: Current Every Day Smoker     Packs/day: 1.00     Years: 30.00     Types: Cigarettes   • Smokeless tobacco: Never Used   • Alcohol use No   • Drug use: No   • Sexual activity: Yes      "Partners: Male     Birth control/ protection: Post-menopausal, Other      Comment: Hysterectomy     Other Topics Concern   • Not on file        Family History   Problem Relation Age of Onset   • Thyroid disease Mother      Hypothyroid   • ALS Mother    • Anxiety disorder Mother    • No Known Problems Father      N/A   • Breast cancer Sister 50   • Cerebral aneurysm Sister    • Depression Sister    • Thyroid cancer Cousin    • Cerebral aneurysm Sister    • Depression Sister    • Arthritis Brother    • Cancer Sister      Breast   • Depression Sister    • Depression Sister    • Thyroid disease Sister      Hyperthyroid   • Depression Sister    • Psychosis Maternal Grandfather    • Depression Daughter    • Malig Hyperthermia Neg Hx         Review of Systems   Constitutional: Positive for unexpected weight change (Weight gain).   Respiratory: Positive for shortness of breath (Due to asthma).    Musculoskeletal: Positive for arthralgias, back pain, gait problem and joint swelling.   Neurological: Positive for dizziness and headaches.        Objective     Vitals:    05/10/18 1236   BP: 136/74   Pulse: 61   Resp: 18   Temp: 98 °F (36.7 °C)   TempSrc: Oral   SpO2: 97%   Weight: 98.2 kg (216 lb 9.6 oz)   Height: 161.5 cm (63.58\")   PainSc: 0-No pain     Current Status 5/10/2018   ECOG score 0       Physical Exam    GENERAL:  Well-developed, well-nourished in no acute distress.   SKIN:  Warm, dry without rashes, purpura or petechiae.  EYES:  Pupils equal, round and reactive to light.  EOMs intact.  Conjunctivae normal.  EARS:  Hearing intact.  NOSE:  Septum midline.  No excoriations or nasal discharge.  MOUTH:  Tongue is well-papillated; no stomatitis or ulcers.  Lips normal.  THROAT:  Oropharynx without lesions or exudates.  NECK:  Supple with good range of motion; no thyromegaly or masses, no JVD.  LYMPHATICS:  No cervical, supraclavicular, axillary or inguinal adenopathy.  CHEST:  Lungs clear to auscultation. Good " airflow.  CARDIAC:  Regular rate and rhythm without murmurs, rubs or gallops. Normal S1,S2.  ABDOMEN:  Soft, nontender with no hepatosplenomegaly or masses.  EXTREMITIES:  No clubbing, cyanosis or edema.Some ulnar deviation of her fingers on the left hand and thumb synovitis in her wrists  NEUROLOGICAL:  Cranial Nerves II-XII grossly intact.  No focal neurological deficits.  PSYCHIATRIC:  Normal affect and mood.        RECENT LABS:  Hematology WBC   Date Value Ref Range Status   05/10/2018 4.94 4.00 - 10.00 10*3/mm3 Final     RBC   Date Value Ref Range Status   05/10/2018 4.37 3.90 - 5.00 10*6/mm3 Final     Hemoglobin   Date Value Ref Range Status   05/10/2018 14.0 11.5 - 14.9 g/dL Final     Hematocrit   Date Value Ref Range Status   05/10/2018 41.1 34.0 - 45.0 % Final     Platelets   Date Value Ref Range Status   05/10/2018 184 150 - 375 10*3/mm3 Final        CT of the abdomen and pelvis dated 12/16 shows no hepatosplenomegaly or kidney masses    IgM anticardiolipin antibody was elevated at 30  Lupus anticoagulant was negative and anti-beta 2 glycoprotein antibodies negative    Assessment/Plan   1.  History of provoked DVT and pulmonary embolism in 1998 treated with 7 years of  coumadin  2.  History of cutaneous lupus with positive SIRI and RNP.Anticard AB  and arthralgias?  Atypical SLE  3.  Positive IgM anticardiolipin antibody intermediate  4.  Family history of breast cancer    Plan  1.  Repeat testing to see if this is a transient anticardiolipin antibody  2.  Minimize estrogen use   3.  Limited hyper coag workup and check for immunoglobulin levels  4.  3-4 weeks review

## 2018-05-11 LAB
CARDIOLIPIN IGG SER IA-ACNC: <9 GPL U/ML (ref 0–14)
CARDIOLIPIN IGM SER IA-ACNC: 56 MPL U/ML (ref 0–12)
PROT C PPP-ACNC: 183 % (ref 86–163)
PROT S ACT/NOR PPP: 110 % (ref 70–127)
PROT S FREE PPP-ACNC: 107 % (ref 49–138)

## 2018-05-12 LAB
B2 GLYCOPROT1 IGA SER-ACNC: <9 GPI IGA UNITS (ref 0–25)
B2 GLYCOPROT1 IGG SER-ACNC: <9 GPI IGG UNITS (ref 0–20)
B2 GLYCOPROT1 IGM SER-ACNC: <9 GPI IGM UNITS (ref 0–32)
DRVVT IMM 1:2 NP PPP: 40.8 SEC (ref 0–47)
LA NT PLATELET PPP: 41.3 SEC (ref 0–51.9)
LUPUS ANTICOAGULANT REFLEX: ABNORMAL
SCREEN DRVVT: 47.1 SEC (ref 0–47)

## 2018-05-13 LAB
ALBUMIN SERPL-MCNC: 4.2 G/DL (ref 2.9–4.4)
ALBUMIN/GLOB SERPL: 1.5 {RATIO} (ref 0.7–1.7)
ALPHA1 GLOB FLD ELPH-MCNC: 0.2 G/DL (ref 0–0.4)
ALPHA2 GLOB SERPL ELPH-MCNC: 0.9 G/DL (ref 0.4–1)
B-GLOBULIN SERPL ELPH-MCNC: 1.1 G/DL (ref 0.7–1.3)
GAMMA GLOB SERPL ELPH-MCNC: 0.9 G/DL (ref 0.4–1.8)
GLOBULIN SER CALC-MCNC: 3 G/DL (ref 2.2–3.9)
IGA SERPL-MCNC: 105 MG/DL (ref 87–352)
IGG SERPL-MCNC: 633 MG/DL (ref 700–1600)
IGM SERPL-MCNC: 201 MG/DL (ref 26–217)
INTERPRETATION SERPL IEP-IMP: ABNORMAL
KAPPA LC SERPL-MCNC: 12.8 MG/L (ref 3.3–19.4)
KAPPA LC/LAMBDA SER: 0.66 {RATIO} (ref 0.26–1.65)
LAMBDA LC FREE SERPL-MCNC: 19.3 MG/L (ref 5.7–26.3)
Lab: ABNORMAL
M-SPIKE: ABNORMAL G/DL
PROT SERPL-MCNC: 7.2 G/DL (ref 6–8.5)

## 2018-05-17 LAB
ANTI-PHOSPHATIDYLETHANOLAMINE, IGA: 0.9 U/ML
ANTI-PHOSPHATIDYLETHANOLAMINE, IGG: 2.4 U/ML
ANTI-PHOSPHATIDYLETHANOLAMINE, IGM: 7.5 U/ML
PS IGG SER-ACNC: 11 GPS IGG (ref 0–11)
PS IGM SER-ACNC: 33 MPS IGM (ref 0–25)

## 2018-05-21 DIAGNOSIS — F41.8 MIXED ANXIETY AND DEPRESSIVE DISORDER: ICD-10-CM

## 2018-05-21 RX ORDER — ALPRAZOLAM 1 MG/1
TABLET ORAL
Qty: 60 TABLET | Refills: 0 | Status: SHIPPED | OUTPATIENT
Start: 2018-05-21 | End: 2018-07-09 | Stop reason: SDUPTHER

## 2018-05-30 DIAGNOSIS — G43.009 MIGRAINE WITHOUT AURA AND WITHOUT STATUS MIGRAINOSUS, NOT INTRACTABLE: Chronic | ICD-10-CM

## 2018-06-01 RX ORDER — BUTALBITAL/ACETAMINOPHEN 50MG-325MG
TABLET ORAL
Qty: 60 TABLET | Refills: 0 | Status: SHIPPED | OUTPATIENT
Start: 2018-06-01 | End: 2018-06-28 | Stop reason: SDUPTHER

## 2018-06-04 ENCOUNTER — OFFICE VISIT (OUTPATIENT)
Dept: FAMILY MEDICINE CLINIC | Facility: CLINIC | Age: 63
End: 2018-06-04

## 2018-06-04 VITALS
SYSTOLIC BLOOD PRESSURE: 132 MMHG | HEART RATE: 71 BPM | DIASTOLIC BLOOD PRESSURE: 84 MMHG | BODY MASS INDEX: 34.49 KG/M2 | RESPIRATION RATE: 13 BRPM | OXYGEN SATURATION: 99 % | HEIGHT: 64 IN | WEIGHT: 202 LBS

## 2018-06-04 DIAGNOSIS — Z12.11 SCREENING FOR COLON CANCER: ICD-10-CM

## 2018-06-04 DIAGNOSIS — F41.9 ANXIETY: ICD-10-CM

## 2018-06-04 DIAGNOSIS — Z12.2 ENCOUNTER FOR SCREENING FOR LUNG CANCER: Primary | ICD-10-CM

## 2018-06-04 PROCEDURE — 99214 OFFICE O/P EST MOD 30 MIN: CPT | Performed by: FAMILY MEDICINE

## 2018-06-05 ENCOUNTER — OFFICE VISIT (OUTPATIENT)
Dept: ONCOLOGY | Facility: CLINIC | Age: 63
End: 2018-06-05

## 2018-06-05 ENCOUNTER — LAB (OUTPATIENT)
Dept: LAB | Facility: HOSPITAL | Age: 63
End: 2018-06-05

## 2018-06-05 VITALS
TEMPERATURE: 98.8 F | HEART RATE: 64 BPM | HEIGHT: 64 IN | RESPIRATION RATE: 16 BRPM | SYSTOLIC BLOOD PRESSURE: 134 MMHG | BODY MASS INDEX: 37.46 KG/M2 | DIASTOLIC BLOOD PRESSURE: 76 MMHG | OXYGEN SATURATION: 96 % | WEIGHT: 219.4 LBS

## 2018-06-05 DIAGNOSIS — G43.009 MIGRAINE WITHOUT AURA AND WITHOUT STATUS MIGRAINOSUS, NOT INTRACTABLE: Primary | ICD-10-CM

## 2018-06-05 DIAGNOSIS — L93.2 CUTANEOUS LUPUS ERYTHEMATOSUS: ICD-10-CM

## 2018-06-05 DIAGNOSIS — I82.5Z2 CHRONIC DEEP VEIN THROMBOSIS (DVT) OF DISTAL VEIN OF LEFT LOWER EXTREMITY (HCC): ICD-10-CM

## 2018-06-05 DIAGNOSIS — D83.9 CVID (COMMON VARIABLE IMMUNODEFICIENCY) (HCC): Primary | ICD-10-CM

## 2018-06-05 PROBLEM — I82.409 DVT (DEEP VENOUS THROMBOSIS) (HCC): Status: ACTIVE | Noted: 2018-06-05

## 2018-06-05 LAB
BASOPHILS # BLD AUTO: 0.03 10*3/MM3 (ref 0–0.1)
BASOPHILS NFR BLD AUTO: 0.6 % (ref 0–1.1)
DEPRECATED RDW RBC AUTO: 42.2 FL (ref 37–49)
EOSINOPHIL # BLD AUTO: 0.07 10*3/MM3 (ref 0–0.36)
EOSINOPHIL NFR BLD AUTO: 1.5 % (ref 1–5)
ERYTHROCYTE [DISTWIDTH] IN BLOOD BY AUTOMATED COUNT: 12.3 % (ref 11.7–14.5)
HCT VFR BLD AUTO: 37.8 % (ref 34–45)
HGB BLD-MCNC: 13 G/DL (ref 11.5–14.9)
IMM GRANULOCYTES # BLD: 0.03 10*3/MM3 (ref 0–0.03)
IMM GRANULOCYTES NFR BLD: 0.6 % (ref 0–0.5)
LYMPHOCYTES # BLD AUTO: 1.56 10*3/MM3 (ref 1–3.5)
LYMPHOCYTES NFR BLD AUTO: 32.8 % (ref 20–49)
MCH RBC QN AUTO: 32 PG (ref 27–33)
MCHC RBC AUTO-ENTMCNC: 34.4 G/DL (ref 32–35)
MCV RBC AUTO: 93.1 FL (ref 83–97)
MONOCYTES # BLD AUTO: 0.48 10*3/MM3 (ref 0.25–0.8)
MONOCYTES NFR BLD AUTO: 10.1 % (ref 4–12)
NEUTROPHILS # BLD AUTO: 2.58 10*3/MM3 (ref 1.5–7)
NEUTROPHILS NFR BLD AUTO: 54.4 % (ref 39–75)
NRBC BLD MANUAL-RTO: 0 /100 WBC (ref 0–0)
PLATELET # BLD AUTO: 176 10*3/MM3 (ref 150–375)
PMV BLD AUTO: 9.3 FL (ref 8.9–12.1)
RBC # BLD AUTO: 4.06 10*6/MM3 (ref 3.9–5)
WBC NRBC COR # BLD: 4.75 10*3/MM3 (ref 4–10)

## 2018-06-05 PROCEDURE — 85025 COMPLETE CBC W/AUTO DIFF WBC: CPT | Performed by: INTERNAL MEDICINE

## 2018-06-05 PROCEDURE — 36416 COLLJ CAPILLARY BLOOD SPEC: CPT | Performed by: INTERNAL MEDICINE

## 2018-06-05 PROCEDURE — 99214 OFFICE O/P EST MOD 30 MIN: CPT | Performed by: INTERNAL MEDICINE

## 2018-06-05 RX ORDER — FONDAPARINUX SODIUM 2.5 MG/.5ML
2.5 INJECTION SUBCUTANEOUS
Qty: 1 ML | Refills: 2 | Status: SHIPPED | OUTPATIENT
Start: 2018-06-05 | End: 2018-12-28

## 2018-06-05 NOTE — PROGRESS NOTES
Subjective     REASON FOR CONSULTATION:  1. Positive IgM anticardiolipin antibodies 3/19 AND 6/18  2.  Cutaneous. lupus and positive SIRI   3.  DVT and PE after neck surgery in 1998  4.  Recurrent UTI on vaginal estrogen therapy   5. ?  C VID on no therapy                      REQUESTING PHYSICIAN:  Kelly Cabrera M.D.    History of Present Illness patient is a 63-year-old lady with cutaneous lupus and a positive anticardiolipin antibody referred to us for evaluation who returns today after blood work was done at her last visit to look at the results.  Her anti-cardio lipid antibody is still persistently positive with a titer IgM of 56  and negative IgG her titer in March was 34.  The rest of her hypercoagulable workup including protein Cand S and antithrombin III and lupus anticoagulant were negative -factor V Leiden and prothrombin gene mutation were negative.  Anti-phosphatidylserine and IgM was elevated at 33 with a normal IgG.  Quantitative immunoglobulins were normal except for mild lowering of IgG of 633 despite being on no treatment for her common variable immunodeficiency and I'm not sure this is a true diagnosis.  Immunofixation showed no paraprotein    She has backed off on vaginal estrogen use which is probably safest for her but she is having some more hot flashes since backing off on the vaginal estrogens likely because of some systemic absorption of the estrogen.At this point there is no indication to anticoagulate preventatively but I did warn her that if she has another DVT we will have to keep her on lifelong anticoagulation with Coumadin.    She and her  are planning a trip to [place] in September and I recommended to Arixtra 2.5 mg subcutaneous for each leg of the flight and we will get this authorized through her insurance.      Past Medical History:   Diagnosis Date   • Anxiety    • Anxiety and depression    • Aortic sclerosis     followed by Dr. Smith, Vascular Surgery   • Asthma     mild  intermittent   • CVID (common variable immunodeficiency)     started to have a bad reaction to IVIG after several years   • Deep vein thrombosis     BEHIND LEFT KNEE   • Depression    • Elevated alkaline phosphatase level     chronic, due to CVID   • Fibromyalgia    • History of pneumonia 1999   • Hyperlipidemia    • Hypothyroidism     followed by Endocrinology, Dr. Kuhn   • Lupus     cutaneous, followed by Rheumatology, Dr. Kelly Cabrera   • Migraines    • Multinodular goiter    • MVP (mitral valve prolapse)     followed by Cardiology, Dr. Bassett   • Osteoarthritis     followed by [facility]   • Osteoporosis    • PONV (postoperative nausea and vomiting)    • Prediabetes    • Pulmonary embolism 1998    x 4 after neck surgery, took coumadin x 10 yr but did not tolerate it   • Recurrent UTI    • Sleep apnea     CPAP Machine #11   • Tobacco abuse         Past Surgical History:   Procedure Laterality Date   • CARDIAC CATHETERIZATION     • CHOLECYSTECTOMY     • COLONOSCOPY     • EPIDURAL BLOCK     • HYSTERECTOMY  1993    complete at age 38, endometriosis   • KNEE ARTHROSCOPY      calcium scraping   • NECK SURGERY  1998    C 5 AND 6 TITANIUM PLATE   • PARTIAL HYSTERECTOMY     • SKIN BIOPSY      benign      HEME HISTORY;  patient is a 62-year-old female with a 1 year history of a generalized arthralgia presenting initially with pseudogout in the left knee which was debilitating and for 3 months she was wheelchair-bound because of pain and immobility.  Symptoms quickly improved but then she has had arthralgias in her fingers and a lot of myalgias and was referred to Dr. Cabrera for evaluation because when she was at the [facility] being evaluated for recurrent UTIs 1 year ago she was found to have positive SIRI and RNP antibodies suspicious for underlying rheumatological illness.  She's been on plaquenil now for about a month for an unclassifiable rheumatological illness but also has a history of  cutaneous lupus diagnosed 15 years ago    The patient has a complicated past history including diagnosis of common variable immunodeficiency in 2005.  She took IV IgG for a year but could not tolerate it and stopped in 1998 she had C-spine surgery with a plate in her neck and postoperatively developed DVT and PE and is better on Coumadin which she stayed on for 7 years and then discontinued in 2005 when her INR was extremely long and the risk of bleeding was felt to outweigh the risk of clotting.  She does not remember being told she had any hypercoagulable state and she saw Dr. Rodgers at that time for hematology.  She has never had a recurrence of her DVT since 2005 and was started on low-dose vaginal estrogen in the [facility] for her recurrent UTIs and she uses a 3 times a week and has had no problems.  She has no family history of DVTs and has a sister with breast cancer at age 57.      Current Outpatient Prescriptions on File Prior to Visit   Medication Sig Dispense Refill   • acebutolol (SECTRAL) 200 MG capsule Take 1 capsule by mouth Daily. 30 capsule 11   • albuterol (PROAIR HFA) 108 (90 Base) MCG/ACT inhaler Inhale 2 puffs 4 (Four) Times a Day. 8.5 inhaler 1   • ALPRAZolam (XANAX) 1 MG tablet take 1 tablet by mouth twice a day if needed for anxiety 60 tablet 0   • aspirin 81 MG chewable tablet Chew 81 mg Daily.     • atorvastatin (LIPITOR) 20 MG tablet Take 1 tablet by mouth Every Night. 30 tablet 11   • buPROPion SR (WELLBUTRIN SR) 150 MG 12 hr tablet Take 1 tablet by mouth 2 (Two) Times a Day. 60 tablet 1   • butalbital-acetaminophen  MG tablet tablet take 1 tablet by mouth twice a day if needed for TENSION HEADACHE 60 tablet 0   • cetirizine (ZYRTEC ALLERGY) 10 MG tablet Take 10 mg by mouth daily.     • Cholecalciferol (VITAMIN D3) 2000 UNITS tablet Take 2,000 Units by mouth Daily.     • Cranberry 300 MG tablet Take 300 mg by mouth Daily.     • FLUoxetine (PROzac) 60 MG tablet Take 1 tablet by  mouth Daily. 30 tablet 5   • fluticasone-salmeterol (ADVAIR DISKUS) 500-50 MCG/DOSE DISKUS Inhale 1 puff 2 (Two) Times a Day. 60 each 1   • folic acid (FOLVITE) 1 MG tablet Take 1 tablet by mouth Daily. 30 tablet 5   • hydroxychloroquine (PLAQUENIL) 200 MG tablet      • levothyroxine (SYNTHROID, LEVOTHROID) 125 MCG tablet take 1/2 tablet by mouth once daily (Patient taking differently: 65mg) 30 tablet 3   • montelukast (SINGULAIR) 10 MG tablet Take 10 mg by mouth Every Night.     • Multiple Vitamins-Minerals (STRESS TAB NF PO) Take  by mouth. vitamin     • Omega-3 Fatty Acids (FISH OIL PO) Take  by mouth Daily.       No current facility-administered medications on file prior to visit.         ALLERGIES:    Allergies   Allergen Reactions   • Codeine Nausea And Vomiting   • Zofran [Ondansetron Hcl] Other (See Comments)     CONSTIPATION        Social History     Social History   • Marital status:    • Years of education: High school     Occupational History   • Unemployed currently Retired     Social History Main Topics   • Smoking status: Current Every Day Smoker     Packs/day: 1.00     Years: 30.00     Types: Cigarettes   • Smokeless tobacco: Never Used   • Alcohol use No   • Drug use: No   • Sexual activity: Yes     Partners: Male     Birth control/ protection: Post-menopausal, Other      Comment: Hysterectomy     Other Topics Concern   • Not on file        Family History   Problem Relation Age of Onset   • Thyroid disease Mother         Hypothyroid   • ALS Mother    • Anxiety disorder Mother    • Aortic aneurysm Father    • Heart disease Father    • Breast cancer Sister 50   • Cerebral aneurysm Sister    • Depression Sister    • Hypertension Sister    • Diabetes Sister    • Thyroid cancer Cousin    • Cerebral aneurysm Sister    • Depression Sister    • Hypertension Sister    • Diabetes Sister    • Arthritis Brother    • Hypertension Brother    • Cancer Sister         Breast   • Depression Sister    •  "Depression Sister    • Thyroid disease Sister         Hyperthyroid   • Depression Sister    • Psychosis Maternal Grandfather    • Depression Daughter    • Malig Hyperthermia Neg Hx         Review of Systems   Constitutional: Positive for unexpected weight change (Weight gain).   Respiratory: Positive for shortness of breath (Due to asthma).    Musculoskeletal: Positive for arthralgias, back pain, gait problem and joint swelling.   Neurological: Positive for dizziness and headaches.        Objective     Vitals:    06/05/18 1111   BP: 134/76   Pulse: 64   Resp: 16   Temp: 98.8 °F (37.1 °C)   TempSrc: Oral   SpO2: 96%   Weight: 99.5 kg (219 lb 6.4 oz)   Height: 161.5 cm (63.58\")   PainSc: 0-No pain     Current Status 6/5/2018   ECOG score 0       Physical Exam    GENERAL:  Well-developed, well-nourished in no acute distress.   SKIN:  Warm, dry without rashes, purpura or petechiae.  EYES:  Pupils equal, round and reactive to light.  EOMs intact.  Conjunctivae normal.  EARS:  Hearing intact.  NOSE:  Septum midline.  No excoriations or nasal discharge.  MOUTH:  Tongue is well-papillated; no stomatitis or ulcers.  Lips normal.  THROAT:  Oropharynx without lesions or exudates.  NECK:  Supple with good range of motion; no thyromegaly or masses, no JVD.  LYMPHATICS:  No cervical, supraclavicular, axillary or inguinal adenopathy.  CHEST:  Lungs clear to auscultation. Good airflow.  CARDIAC:  Regular rate and rhythm without murmurs, rubs or gallops. Normal S1,S2.  ABDOMEN:  Soft, nontender with no hepatosplenomegaly or masses.  EXTREMITIES:  No clubbing, cyanosis or edema.Some ulnar deviation of her fingers on the left hand and thumb synovitis in her wrists  NEUROLOGICAL:  Cranial Nerves II-XII grossly intact.  No focal neurological deficits.  PSYCHIATRIC:  Normal affect and mood.        RECENT LABS:  Hematology WBC   Date Value Ref Range Status   06/05/2018 4.75 4.00 - 10.00 10*3/mm3 Final     RBC   Date Value Ref Range Status "   06/05/2018 4.06 3.90 - 5.00 10*6/mm3 Final     Hemoglobin   Date Value Ref Range Status   06/05/2018 13.0 11.5 - 14.9 g/dL Final     Hematocrit   Date Value Ref Range Status   06/05/2018 37.8 34.0 - 45.0 % Final     Platelets   Date Value Ref Range Status   06/05/2018 176 150 - 375 10*3/mm3 Final        CT of the abdomen and pelvis dated 12/16 shows no hepatosplenomegaly or kidney masses    IgM anticardiolipin antibody was elevated at 30-repeat testing in 5 /18 was 56   Lupus anticoagulant was negative and anti-beta 2 glycoprotein antibodies negative    Assessment/Plan   1.  History of provoked DVT and pulmonary embolism in 1998 treated with 7 years of  coumadin  2.  History of cutaneous lupus with positive SIRI and RNP.Anticard AB  and arthralgias?  Atypical SLE  3.  Positive IgM anticardiolipin antibody intermediate-persistent  4.  Family history of breast cancer  5.  History of CVID with fairly normal immunoglobulin levels    Plan  1.   Minimize estrogen use   2.  Return in 3 months with one more repeat anticardiolipin antibody and we would recommend Arixtra for her flight to [place] 2.5 mg subcutaneous going and  coming

## 2018-06-06 DIAGNOSIS — J45.909 UNCOMPLICATED ASTHMA, UNSPECIFIED ASTHMA SEVERITY, UNSPECIFIED WHETHER PERSISTENT: ICD-10-CM

## 2018-06-07 LAB — HEMOCCULT STL QL IA: POSITIVE

## 2018-06-08 DIAGNOSIS — R19.5 OCCULT BLOOD POSITIVE STOOL: Primary | ICD-10-CM

## 2018-06-11 ENCOUNTER — OFFICE VISIT (OUTPATIENT)
Dept: GASTROENTEROLOGY | Facility: CLINIC | Age: 63
End: 2018-06-11

## 2018-06-11 ENCOUNTER — TELEPHONE (OUTPATIENT)
Dept: GASTROENTEROLOGY | Facility: CLINIC | Age: 63
End: 2018-06-11

## 2018-06-11 VITALS
WEIGHT: 218.6 LBS | TEMPERATURE: 98.7 F | SYSTOLIC BLOOD PRESSURE: 118 MMHG | DIASTOLIC BLOOD PRESSURE: 60 MMHG | BODY MASS INDEX: 37.32 KG/M2 | HEIGHT: 64 IN

## 2018-06-11 DIAGNOSIS — K59.09 OTHER CONSTIPATION: Primary | ICD-10-CM

## 2018-06-11 DIAGNOSIS — R19.5 HEME POSITIVE STOOL: ICD-10-CM

## 2018-06-11 DIAGNOSIS — Z12.11 COLON CANCER SCREENING: ICD-10-CM

## 2018-06-11 DIAGNOSIS — R74.8 ELEVATED ALKALINE PHOSPHATASE LEVEL: ICD-10-CM

## 2018-06-11 PROCEDURE — 99204 OFFICE O/P NEW MOD 45 MIN: CPT | Performed by: INTERNAL MEDICINE

## 2018-06-11 RX ORDER — ASPIRIN 81 MG
200 TABLET, DELAYED RELEASE (ENTERIC COATED) ORAL DAILY
Qty: 60 TABLET | Refills: 3 | Status: SHIPPED | OUTPATIENT
Start: 2018-06-11 | End: 2019-04-09

## 2018-06-11 RX ORDER — SODIUM CHLORIDE, SODIUM LACTATE, POTASSIUM CHLORIDE, CALCIUM CHLORIDE 600; 310; 30; 20 MG/100ML; MG/100ML; MG/100ML; MG/100ML
30 INJECTION, SOLUTION INTRAVENOUS CONTINUOUS
Status: CANCELLED | OUTPATIENT
Start: 2018-06-11

## 2018-06-11 NOTE — TELEPHONE ENCOUNTER
Call received from [facility] pt last seen 2013 - asking if what these records.  Advise do want records - she will fax as requested.

## 2018-06-11 NOTE — PROGRESS NOTES
"Chief Complaint   Patient presents with   • Rectal Bleeding       Subjective     HPI    Kaci Mcgee is a 63 y.o. female with a past medical history noted below who presents for evaluation of rectal bleeding and constipation.    She was sent for evaluation of heme positive stool.  She had a routine checkup with her pcp last week, she had a positive hemoccult test.  This was checked because she has not had a colonoscopy in over 15 years and the last one was incomplete due to tortuous colon.  She denies any overt bleeding rectally including no melena, hematochezia.  No abdominal pain, rectal pain.  Thinks she has a hemorrhoid but cannot confirm this diagnosis.  Her hemoglobin has been stable on my review.  She does take fioricet every day.     She does feel like she has had chronic issues with constipation.  It has been an issue for a number of years.  She says it is intermittent and she feels it is worse when she takes estrogen supplementation.  She describes it as having hard, ball like stools.  She has most recently started a stool softener and this has helped her to have softer stools on a daily basis.  She denies that she has struggling or straining to pass her stools.  She has not been on any count of medication on a regular basis.    Review of labs shows that she has had a chronically elevated alkaline phosphatase.  I do not see any antimitochondrial antibody testing.  When questioned about this, she reports that she saw somebody [facility] and had a complete workup for this.    Last colonoscopy about 15 years ago. She was told to \"never have one again\" due to what she describes as a tortuous colon.  She remembers waking in the middle of the procedure.  Thinks a brother has polyps.  Smokes 1 ppd. No ETOH.  Retired from [employer]. S/p cholecystectomy.        Past Medical History:   Diagnosis Date   • Anxiety    • Anxiety and depression    • Aortic sclerosis     followed by Dr. Smith, " Vascular Surgery   • Asthma     mild intermittent   • Clotting disorder    • CVID (common variable immunodeficiency)     started to have a bad reaction to IVIG after several years   • Deep vein thrombosis     BEHIND LEFT KNEE   • Depression    • Elevated alkaline phosphatase level     chronic, due to CVID   • Fatty liver 2006 ?   • Fibromyalgia    • History of pneumonia 1999   • Hyperlipidemia    • Hypothyroidism     followed by Endocrinology, Dr. Kuhn   • Lupus     cutaneous, followed by Rheumatology, Dr. Mendoza June   • Migraines    • Multinodular goiter    • MVP (mitral valve prolapse)     followed by Cardiology, Dr. Bassett   • Osteoarthritis     followed by [facility]   • Osteoporosis    • PONV (postoperative nausea and vomiting)    • Prediabetes    • Pulmonary embolism 1998    x 4 after neck surgery, took coumadin x 10 yr but did not tolerate it   • Recurrent UTI    • Sleep apnea     CPAP Machine #11   • Tobacco abuse          Current Outpatient Prescriptions:   •  acebutolol (SECTRAL) 200 MG capsule, Take 1 capsule by mouth Daily., Disp: 30 capsule, Rfl: 11  •  ALPRAZolam (XANAX) 1 MG tablet, take 1 tablet by mouth twice a day if needed for anxiety, Disp: 60 tablet, Rfl: 0  •  aspirin 81 MG chewable tablet, Chew 81 mg Daily., Disp: , Rfl:   •  atorvastatin (LIPITOR) 20 MG tablet, Take 1 tablet by mouth Every Night., Disp: 30 tablet, Rfl: 11  •  buPROPion SR (WELLBUTRIN SR) 150 MG 12 hr tablet, Take 1 tablet by mouth 2 (Two) Times a Day., Disp: 60 tablet, Rfl: 1  •  butalbital-acetaminophen  MG tablet tablet, take 1 tablet by mouth twice a day if needed for TENSION HEADACHE, Disp: 60 tablet, Rfl: 0  •  cetirizine (ZYRTEC ALLERGY) 10 MG tablet, Take 10 mg by mouth daily., Disp: , Rfl:   •  Cholecalciferol (VITAMIN D3) 2000 UNITS tablet, Take 2,000 Units by mouth Daily., Disp: , Rfl:   •  Cranberry 300 MG tablet, Take 300 mg by mouth Daily., Disp: , Rfl:   •  FLUoxetine (PROzac) 60  MG tablet, Take 1 tablet by mouth Daily., Disp: 30 tablet, Rfl: 5  •  fluticasone-salmeterol (ADVAIR DISKUS) 500-50 MCG/DOSE DISKUS, Inhale 1 puff 2 (Two) Times a Day., Disp: 60 each, Rfl: 1  •  folic acid (FOLVITE) 1 MG tablet, Take 1 tablet by mouth Daily., Disp: 30 tablet, Rfl: 5  •  fondaparinux (ARIXTRA) 2.5 MG/0.5ML solution injection, Inject 0.5 mL under the skin Daily., Disp: 1 mL, Rfl: 2  •  hydroxychloroquine (PLAQUENIL) 200 MG tablet, , Disp: , Rfl:   •  levothyroxine (SYNTHROID, LEVOTHROID) 125 MCG tablet, take 1/2 tablet by mouth once daily (Patient taking differently: 65mg), Disp: 30 tablet, Rfl: 3  •  montelukast (SINGULAIR) 10 MG tablet, Take 10 mg by mouth Every Night., Disp: , Rfl:   •  Multiple Vitamins-Minerals (STRESS TAB NF PO), Take  by mouth. vitamin, Disp: , Rfl:   •  Omega-3 Fatty Acids (FISH OIL PO), Take  by mouth Daily., Disp: , Rfl:   •  PROAIR  (90 Base) MCG/ACT inhaler, inhale 2 puffs by mouth four times a day if needed, Disp: 8.5 g, Rfl: 1  •  Docusate Sodium 100 MG capsule, Take 200 mg by mouth Daily., Disp: 60 tablet, Rfl: 3    Allergies   Allergen Reactions   • Codeine Nausea And Vomiting   • Zofran [Ondansetron Hcl] Other (See Comments)     CONSTIPATION       Social History     Social History   • Marital status:      Spouse name: N/A   • Number of children: N/A   • Years of education: High school     Occupational History   • Unemployed currently Retired     Social History Main Topics   • Smoking status: Current Every Day Smoker     Packs/day: 1.00     Years: 30.00     Types: Cigarettes   • Smokeless tobacco: Never Used   • Alcohol use No   • Drug use: No   • Sexual activity: Yes     Partners: Male     Birth control/ protection: Post-menopausal, Other      Comment: Hysterectomy     Other Topics Concern   • Not on file     Social History Narrative   • No narrative on file       Family History   Problem Relation Age of Onset   • Thyroid disease Mother          Hypothyroid   • ALS Mother    • Anxiety disorder Mother    • Aortic aneurysm Father    • Heart disease Father    • Breast cancer Sister 50   • Cerebral aneurysm Sister    • Depression Sister    • Hypertension Sister    • Diabetes Sister    • Thyroid cancer Cousin    • Cerebral aneurysm Sister    • Depression Sister    • Hypertension Sister    • Diabetes Sister    • Arthritis Brother    • Hypertension Brother    • Colon polyps Brother         fine   • Cancer Sister         Breast   • Depression Sister    • Depression Sister    • Thyroid disease Sister         Hyperthyroid   • Depression Sister    • Psychosis Maternal Grandfather    • Depression Daughter    • Malig Hyperthermia Neg Hx        Review of Systems   Constitutional: Negative for activity change, appetite change and fatigue.   HENT: Negative for sore throat and trouble swallowing.    Respiratory: Negative.    Cardiovascular: Negative.    Gastrointestinal: Positive for constipation. Negative for abdominal distention, abdominal pain and blood in stool.   Endocrine: Negative for cold intolerance and heat intolerance.   Genitourinary: Negative for difficulty urinating, dysuria and frequency.   Musculoskeletal: Negative for arthralgias, back pain and myalgias.   Skin: Negative.    Hematological: Negative for adenopathy. Does not bruise/bleed easily.   All other systems reviewed and are negative.      Objective     Vitals:    06/11/18 0955   BP: 118/60   Temp: 98.7 °F (37.1 °C)     1    06/11/18  0955   Weight: 99.2 kg (218 lb 9.6 oz)     Body mass index is 37.52 kg/m².    Physical Exam   Constitutional: She is oriented to person, place, and time. She appears well-developed and well-nourished. No distress.   HENT:   Head: Normocephalic and atraumatic.   Right Ear: External ear normal.   Left Ear: External ear normal.   Nose: Nose normal.   Mouth/Throat: Oropharynx is clear and moist.   Eyes: Conjunctivae and EOM are normal. Right eye exhibits no discharge. Left  eye exhibits no discharge. No scleral icterus.   Neck: Normal range of motion. Neck supple. No thyromegaly present.   No supraclavicular adenopathy   Cardiovascular: Normal rate, regular rhythm, normal heart sounds and intact distal pulses.  Exam reveals no gallop.    No murmur heard.  No lower extremity edema   Pulmonary/Chest: Effort normal and breath sounds normal. No respiratory distress. She has no wheezes.   Abdominal: Soft. Normal appearance and bowel sounds are normal. She exhibits no distension and no mass. There is no hepatosplenomegaly. There is no tenderness. There is no rigidity, no rebound and no guarding. No hernia.   obese   Genitourinary:   Genitourinary Comments: Rectal exam deferred   Musculoskeletal: Normal range of motion. She exhibits no edema or tenderness.   No atrophy of upper or lower extremities.  Normal digits and nails of both hands.   Lymphadenopathy:     She has no cervical adenopathy.   Neurological: She is alert and oriented to person, place, and time. She displays no atrophy. Coordination normal.   Skin: Skin is warm and dry. No rash noted. She is not diaphoretic. No erythema.   Psychiatric: She has a normal mood and affect. Her behavior is normal. Judgment and thought content normal.   Vitals reviewed.      WBC   Date Value Ref Range Status   06/05/2018 4.75 4.00 - 10.00 10*3/mm3 Final     RBC   Date Value Ref Range Status   06/05/2018 4.06 3.90 - 5.00 10*6/mm3 Final     Hemoglobin   Date Value Ref Range Status   06/05/2018 13.0 11.5 - 14.9 g/dL Final     Hematocrit   Date Value Ref Range Status   06/05/2018 37.8 34.0 - 45.0 % Final     MCV   Date Value Ref Range Status   06/05/2018 93.1 83.0 - 97.0 fL Final     MCH   Date Value Ref Range Status   06/05/2018 32.0 27.0 - 33.0 pg Final     MCHC   Date Value Ref Range Status   06/05/2018 34.4 32.0 - 35.0 g/dL Final     RDW   Date Value Ref Range Status   06/05/2018 12.3 11.7 - 14.5 % Final     RDW-SD   Date Value Ref Range Status    06/05/2018 42.2 37.0 - 49.0 fl Final     MPV   Date Value Ref Range Status   06/05/2018 9.3 8.9 - 12.1 fL Final     Platelets   Date Value Ref Range Status   06/05/2018 176 150 - 375 10*3/mm3 Final     Neutrophil %   Date Value Ref Range Status   06/05/2018 54.4 39.0 - 75.0 % Final     Lymphocyte %   Date Value Ref Range Status   06/05/2018 32.8 20.0 - 49.0 % Final     Monocyte %   Date Value Ref Range Status   06/05/2018 10.1 4.0 - 12.0 % Final     Eosinophil %   Date Value Ref Range Status   06/05/2018 1.5 1.0 - 5.0 % Final     Basophil %   Date Value Ref Range Status   06/05/2018 0.6 0.0 - 1.1 % Final     Immature Grans %   Date Value Ref Range Status   06/05/2018 0.6 (H) 0.0 - 0.5 % Final     Neutrophils, Absolute   Date Value Ref Range Status   06/05/2018 2.58 1.50 - 7.00 10*3/mm3 Final     Lymphocytes, Absolute   Date Value Ref Range Status   06/05/2018 1.56 1.00 - 3.50 10*3/mm3 Final     Monocytes, Absolute   Date Value Ref Range Status   06/05/2018 0.48 0.25 - 0.80 10*3/mm3 Final     Eosinophils, Absolute   Date Value Ref Range Status   06/05/2018 0.07 0.00 - 0.36 10*3/mm3 Final     Basophils, Absolute   Date Value Ref Range Status   06/05/2018 0.03 0.00 - 0.10 10*3/mm3 Final     Immature Grans, Absolute   Date Value Ref Range Status   06/05/2018 0.03 0.00 - 0.03 10*3/mm3 Final     nRBC   Date Value Ref Range Status   06/05/2018 0.0 0.0 - 0.0 /100 WBC Final       Glucose   Date Value Ref Range Status   08/14/2017 115 (H) 65 - 99 mg/dL Final     Sodium   Date Value Ref Range Status   01/18/2018 139 136 - 145 mmol/L Final   08/14/2017 139 136 - 145 mmol/L Final     Potassium   Date Value Ref Range Status   01/18/2018 3.9 3.5 - 5.2 mmol/L Final   08/14/2017 3.2 (L) 3.5 - 5.2 mmol/L Final     CO2   Date Value Ref Range Status   08/14/2017 23.6 22.0 - 29.0 mmol/L Final     Total CO2   Date Value Ref Range Status   01/18/2018 26.1 22.0 - 29.0 mmol/L Final     Chloride   Date Value Ref Range Status   01/18/2018  99 98 - 107 mmol/L Final   08/14/2017 101 98 - 107 mmol/L Final     Anion Gap   Date Value Ref Range Status   08/14/2017 14.4 mmol/L Final     Creatinine   Date Value Ref Range Status   01/18/2018 0.82 0.57 - 1.00 mg/dL Final   08/14/2017 0.79 0.57 - 1.00 mg/dL Final     BUN   Date Value Ref Range Status   01/18/2018 11 8 - 23 mg/dL Final   08/14/2017 12 8 - 23 mg/dL Final     BUN/Creatinine Ratio   Date Value Ref Range Status   01/18/2018 13.4 7.0 - 25.0 Final   08/14/2017 15.2 7.0 - 25.0 Final     Calcium   Date Value Ref Range Status   01/18/2018 9.4 8.6 - 10.5 mg/dL Final   08/14/2017 9.5 8.6 - 10.5 mg/dL Final     eGFR Non  Amer   Date Value Ref Range Status   08/14/2017 74 >60 mL/min/1.73 Final     eGFR Non  Am   Date Value Ref Range Status   01/18/2018 71 >60 mL/min/1.73 Final     Alkaline Phosphatase   Date Value Ref Range Status   01/18/2018 190 (H) 39 - 117 U/L Final   08/03/2017 173 (H) 39 - 117 U/L Final     Total Protein   Date Value Ref Range Status   08/03/2017 7.1 6.0 - 8.5 g/dL Final     ALT (SGPT)   Date Value Ref Range Status   01/18/2018 20 1 - 33 U/L Final   08/03/2017 21 1 - 33 U/L Final     AST (SGOT)   Date Value Ref Range Status   01/18/2018 14 1 - 32 U/L Final   08/03/2017 14 1 - 32 U/L Final     Total Bilirubin   Date Value Ref Range Status   01/18/2018 0.2 0.1 - 1.2 mg/dL Final   08/03/2017 0.2 0.1 - 1.2 mg/dL Final     Albumin   Date Value Ref Range Status   05/10/2018 4.2 2.9 - 4.4 g/dL Final   08/03/2017 4.30 3.50 - 5.20 g/dL Final     Globulin   Date Value Ref Range Status   08/03/2017 2.8 gm/dL Final     A/G Ratio   Date Value Ref Range Status   05/10/2018 1.5 0.7 - 1.7 Final   08/03/2017 1.5 g/dL Final         Imaging Results (last 7 days)     ** No results found for the last 168 hours. **            No notes on file    Assessment/Plan    Heme positive stool: No overt bleeding.  She has not had a complete colonoscopy    Chronic constipation: She seems to be doing  better on some stool softeners and wishes to continue this    Elevated alkaline phosphatase: She reports prior extensive workup for this    Colon cancer screening: She has never had a complete colonoscopy    Plan  I have advised her that she needs a complete screening colonoscopy for findings of heme positive stool and for the fact that she has not had a complete colonoscopy.  I explained that she likely was under conscious sedation during her last colonoscopy and that with propofol sedation, the outcomes may be very different.  I do not think CT scan imaging will be helpful.  I don't think a  cologuard test is adequate in this situation.  She wishes to consider this and discuss it with her cousin, Dr. Giovana Mooney before proceeding.    Continue daily stool softeners    We'll get her prior records from [facility] for workup of her alkaline phosphatase    Kaci was seen today for rectal bleeding.    Diagnoses and all orders for this visit:    Other constipation  -     Docusate Sodium 100 MG capsule; Take 200 mg by mouth Daily.    Heme positive stool  -     Case Request; Standing  -     Implement Anesthesia Orders Day of Procedure; Standing  -     Obtain Informed Consent; Standing  -     Verify bowel prep was successful; Standing  -     lactated ringers infusion; Infuse 30 mL/hr into a venous catheter Continuous.  -     Case Request  -     Docusate Sodium 100 MG capsule; Take 200 mg by mouth Daily.    Colon cancer screening  -     Case Request; Standing  -     Implement Anesthesia Orders Day of Procedure; Standing  -     Obtain Informed Consent; Standing  -     Verify bowel prep was successful; Standing  -     lactated ringers infusion; Infuse 30 mL/hr into a venous catheter Continuous.  -     Case Request        I have discussed the above plan with the patient.  They verbalize understanding and are in agreement with the plan.  They have been advised to contact the office for any questions,  concerns, or changes related to their health.    Dictated utilizing Dragon dictation

## 2018-06-11 NOTE — TELEPHONE ENCOUNTER
----- Message from Danae Flower MD sent at 6/11/2018 12:24 PM EDT -----  She reports a workup for abnormal liver tests with [facility].  Can we please get these records, thank you

## 2018-06-11 NOTE — PATIENT INSTRUCTIONS
Continue the stool softener    Recommend scheduling the colonoscopy for further evaluation of the blood in your stool    Start fiber supplementation with benefiber    For any additional questions, concerns or changes to your condition after today's office visit please contact the office at [phone].

## 2018-06-21 ENCOUNTER — LAB (OUTPATIENT)
Dept: ENDOCRINOLOGY | Age: 63
End: 2018-06-21

## 2018-06-21 DIAGNOSIS — E03.8 SUBCLINICAL HYPOTHYROIDISM: ICD-10-CM

## 2018-06-21 DIAGNOSIS — E05.90 SUBCLINICAL HYPERTHYROIDISM: Primary | ICD-10-CM

## 2018-06-21 DIAGNOSIS — E05.90 SUBCLINICAL HYPERTHYROIDISM: ICD-10-CM

## 2018-06-21 LAB
ALBUMIN SERPL-MCNC: 4.3 G/DL (ref 3.5–5.2)
ALBUMIN/GLOB SERPL: 2.2 G/DL
ALP SERPL-CCNC: 174 U/L (ref 39–117)
ALT SERPL-CCNC: 22 U/L (ref 1–33)
AST SERPL-CCNC: 19 U/L (ref 1–32)
BILIRUB SERPL-MCNC: 0.2 MG/DL (ref 0.1–1.2)
BUN SERPL-MCNC: 11 MG/DL (ref 8–23)
BUN/CREAT SERPL: 14.7 (ref 7–25)
CALCIUM SERPL-MCNC: 9.2 MG/DL (ref 8.6–10.5)
CHLORIDE SERPL-SCNC: 101 MMOL/L (ref 98–107)
CO2 SERPL-SCNC: 28.3 MMOL/L (ref 22–29)
CREAT SERPL-MCNC: 0.75 MG/DL (ref 0.57–1)
GFR SERPLBLD CREATININE-BSD FMLA CKD-EPI: 78 ML/MIN/1.73
GFR SERPLBLD CREATININE-BSD FMLA CKD-EPI: 95 ML/MIN/1.73
GLOBULIN SER CALC-MCNC: 2 GM/DL
GLUCOSE SERPL-MCNC: 93 MG/DL (ref 65–99)
POTASSIUM SERPL-SCNC: 4.1 MMOL/L (ref 3.5–5.2)
PROT SERPL-MCNC: 6.3 G/DL (ref 6–8.5)
SODIUM SERPL-SCNC: 140 MMOL/L (ref 136–145)
T4 FREE SERPL-MCNC: 1.06 NG/DL (ref 0.93–1.7)
TSH SERPL DL<=0.005 MIU/L-ACNC: 0.7 MIU/ML (ref 0.27–4.2)

## 2018-06-25 DIAGNOSIS — Z00.00 HEALTHCARE MAINTENANCE: ICD-10-CM

## 2018-06-28 ENCOUNTER — OFFICE VISIT (OUTPATIENT)
Dept: ENDOCRINOLOGY | Age: 63
End: 2018-06-28

## 2018-06-28 VITALS
SYSTOLIC BLOOD PRESSURE: 110 MMHG | HEART RATE: 81 BPM | WEIGHT: 219.4 LBS | HEIGHT: 64 IN | BODY MASS INDEX: 37.46 KG/M2 | DIASTOLIC BLOOD PRESSURE: 62 MMHG

## 2018-06-28 DIAGNOSIS — G43.009 MIGRAINE WITHOUT AURA AND WITHOUT STATUS MIGRAINOSUS, NOT INTRACTABLE: Chronic | ICD-10-CM

## 2018-06-28 DIAGNOSIS — E04.2 MULTINODULAR GOITER: Chronic | ICD-10-CM

## 2018-06-28 DIAGNOSIS — M81.0 POSTMENOPAUSAL OSTEOPOROSIS: ICD-10-CM

## 2018-06-28 DIAGNOSIS — R63.5 ABNORMAL WEIGHT GAIN: ICD-10-CM

## 2018-06-28 DIAGNOSIS — E03.9 HYPOTHYROIDISM (ACQUIRED): Primary | ICD-10-CM

## 2018-06-28 LAB
T3 SERPL-MCNC: 133.5 NG/DL (ref 80–200)
T4 FREE SERPL-MCNC: 0.97 NG/DL (ref 0.93–1.7)
TSH SERPL DL<=0.005 MIU/L-ACNC: 0.55 MIU/ML (ref 0.27–4.2)

## 2018-06-28 PROCEDURE — 99214 OFFICE O/P EST MOD 30 MIN: CPT | Performed by: INTERNAL MEDICINE

## 2018-06-28 RX ORDER — BUTALBITAL/ACETAMINOPHEN 50MG-325MG
TABLET ORAL
Qty: 60 TABLET | Refills: 0 | Status: SHIPPED | OUTPATIENT
Start: 2018-06-28 | End: 2018-08-16 | Stop reason: SDUPTHER

## 2018-06-28 RX ORDER — ESTRADIOL 0.1 MG/G
CREAM VAGINAL
COMMUNITY
Start: 2018-06-07 | End: 2019-08-23 | Stop reason: SDUPTHER

## 2018-06-28 RX ORDER — FLUCONAZOLE 150 MG/1
150 TABLET ORAL ONCE
Qty: 1 TABLET | Refills: 2 | Status: SHIPPED | OUTPATIENT
Start: 2018-06-28 | End: 2018-06-28

## 2018-06-28 RX ORDER — FLUTICASONE PROPIONATE 50 MCG
2 SPRAY, SUSPENSION (ML) NASAL DAILY
Qty: 1 BOTTLE | Refills: 2 | Status: SHIPPED | OUTPATIENT
Start: 2018-06-28 | End: 2019-05-08

## 2018-07-09 DIAGNOSIS — F41.8 MIXED ANXIETY AND DEPRESSIVE DISORDER: ICD-10-CM

## 2018-07-09 RX ORDER — ALPRAZOLAM 1 MG/1
TABLET ORAL
Qty: 60 TABLET | Refills: 0 | Status: SHIPPED | OUTPATIENT
Start: 2018-07-09 | End: 2018-07-24 | Stop reason: SDUPTHER

## 2018-07-10 ENCOUNTER — HOSPITAL ENCOUNTER (OUTPATIENT)
Dept: ULTRASOUND IMAGING | Facility: HOSPITAL | Age: 63
Discharge: HOME | End: 2018-07-10
Attending: INTERNAL MEDICINE | Admitting: INTERNAL MEDICINE

## 2018-07-10 DIAGNOSIS — E04.2 MULTINODULAR GOITER: Chronic | ICD-10-CM

## 2018-07-10 DIAGNOSIS — E03.9 HYPOTHYROIDISM (ACQUIRED): ICD-10-CM

## 2018-07-10 PROCEDURE — 76536 US EXAM OF HEAD AND NECK: CPT

## 2018-07-24 DIAGNOSIS — J45.909 UNCOMPLICATED ASTHMA, UNSPECIFIED ASTHMA SEVERITY, UNSPECIFIED WHETHER PERSISTENT: ICD-10-CM

## 2018-07-24 DIAGNOSIS — F17.219 CIGARETTE NICOTINE DEPENDENCE WITH NICOTINE-INDUCED DISORDER: ICD-10-CM

## 2018-07-24 DIAGNOSIS — F41.8 MIXED ANXIETY AND DEPRESSIVE DISORDER: ICD-10-CM

## 2018-07-24 DIAGNOSIS — G43.009 MIGRAINE WITHOUT AURA AND WITHOUT STATUS MIGRAINOSUS, NOT INTRACTABLE: Chronic | ICD-10-CM

## 2018-07-24 RX ORDER — BUTALBITAL/ACETAMINOPHEN 50MG-325MG
TABLET ORAL
Qty: 60 TABLET | Refills: 0 | Status: SHIPPED | OUTPATIENT
Start: 2018-07-24 | End: 2018-08-16 | Stop reason: SDUPTHER

## 2018-07-24 RX ORDER — ALPRAZOLAM 1 MG/1
1 TABLET ORAL 2 TIMES DAILY PRN
Qty: 60 TABLET | Refills: 0 | Status: SHIPPED | OUTPATIENT
Start: 2018-07-24 | End: 2018-09-09 | Stop reason: ALTCHOICE

## 2018-07-24 RX ORDER — BUPROPION HYDROCHLORIDE 150 MG/1
TABLET, EXTENDED RELEASE ORAL
Qty: 60 TABLET | Refills: 1 | Status: SHIPPED | OUTPATIENT
Start: 2018-07-24 | End: 2018-08-28

## 2018-07-31 ENCOUNTER — APPOINTMENT (OUTPATIENT)
Dept: WOMENS IMAGING | Facility: HOSPITAL | Age: 63
End: 2018-07-31

## 2018-07-31 PROCEDURE — 77067 SCR MAMMO BI INCL CAD: CPT | Performed by: RADIOLOGY

## 2018-07-31 PROCEDURE — 77063 BREAST TOMOSYNTHESIS BI: CPT | Performed by: RADIOLOGY

## 2018-07-31 PROCEDURE — MDREVIEWSP: Performed by: RADIOLOGY

## 2018-08-03 NOTE — TELEPHONE ENCOUNTER
Patient sent a [employer] message asking for Duo-Neb to be sent in. She attached an rx that was from 2015. Okay to fill?

## 2018-08-04 RX ORDER — IPRATROPIUM BROMIDE AND ALBUTEROL SULFATE 2.5; .5 MG/3ML; MG/3ML
3 SOLUTION RESPIRATORY (INHALATION) EVERY 4 HOURS PRN
Qty: 360 ML | Refills: 1 | Status: SHIPPED | OUTPATIENT
Start: 2018-08-04 | End: 2019-05-08 | Stop reason: ALTCHOICE

## 2018-08-16 DIAGNOSIS — G43.809 OTHER MIGRAINE WITHOUT STATUS MIGRAINOSUS, NOT INTRACTABLE: Primary | ICD-10-CM

## 2018-08-16 DIAGNOSIS — G43.819 OTHER MIGRAINE WITHOUT STATUS MIGRAINOSUS, INTRACTABLE: Primary | ICD-10-CM

## 2018-08-16 RX ORDER — BUTALBITAL, ASPIRIN, AND CAFFEINE 50; 325; 40 MG/1; MG/1; MG/1
1 CAPSULE ORAL 2 TIMES DAILY PRN
Qty: 60 CAPSULE | Refills: 0 | Status: SHIPPED | OUTPATIENT
Start: 2018-08-16 | End: 2018-08-16

## 2018-08-16 RX ORDER — BUTALBITAL, ACETAMINOPHEN AND CAFFEINE 50; 325; 40 MG/1; MG/1; MG/1
1 TABLET ORAL 2 TIMES DAILY PRN
Qty: 60 TABLET | Refills: 0 | Status: SHIPPED | OUTPATIENT
Start: 2018-08-16 | End: 2018-09-20 | Stop reason: SDUPTHER

## 2018-08-21 ENCOUNTER — LAB (OUTPATIENT)
Dept: LAB | Facility: HOSPITAL | Age: 63
End: 2018-08-21

## 2018-08-21 DIAGNOSIS — Z12.11 COLON CANCER SCREENING: Primary | ICD-10-CM

## 2018-08-21 DIAGNOSIS — I82.5Z2 CHRONIC DEEP VEIN THROMBOSIS (DVT) OF DISTAL VEIN OF LEFT LOWER EXTREMITY (HCC): ICD-10-CM

## 2018-08-21 LAB
BASOPHILS # BLD AUTO: 0.03 10*3/MM3 (ref 0–0.1)
BASOPHILS NFR BLD AUTO: 0.7 % (ref 0–1.1)
DEPRECATED RDW RBC AUTO: 43.8 FL (ref 37–49)
EOSINOPHIL # BLD AUTO: 0.04 10*3/MM3 (ref 0–0.36)
EOSINOPHIL NFR BLD AUTO: 1 % (ref 1–5)
ERYTHROCYTE [DISTWIDTH] IN BLOOD BY AUTOMATED COUNT: 12.5 % (ref 11.7–14.5)
HCT VFR BLD AUTO: 40.9 % (ref 34–45)
HGB BLD-MCNC: 13.6 G/DL (ref 11.5–14.9)
IMM GRANULOCYTES # BLD: 0.01 10*3/MM3 (ref 0–0.03)
IMM GRANULOCYTES NFR BLD: 0.2 % (ref 0–0.5)
LYMPHOCYTES # BLD AUTO: 1.56 10*3/MM3 (ref 1–3.5)
LYMPHOCYTES NFR BLD AUTO: 38.2 % (ref 20–49)
MCH RBC QN AUTO: 32 PG (ref 27–33)
MCHC RBC AUTO-ENTMCNC: 33.3 G/DL (ref 32–35)
MCV RBC AUTO: 96.2 FL (ref 83–97)
MONOCYTES # BLD AUTO: 0.42 10*3/MM3 (ref 0.25–0.8)
MONOCYTES NFR BLD AUTO: 10.3 % (ref 4–12)
NEUTROPHILS # BLD AUTO: 2.02 10*3/MM3 (ref 1.5–7)
NEUTROPHILS NFR BLD AUTO: 49.6 % (ref 39–75)
NRBC BLD MANUAL-RTO: 0 /100 WBC (ref 0–0)
PLATELET # BLD AUTO: 171 10*3/MM3 (ref 150–375)
PMV BLD AUTO: 9.5 FL (ref 8.9–12.1)
RBC # BLD AUTO: 4.25 10*6/MM3 (ref 3.9–5)
WBC NRBC COR # BLD: 4.08 10*3/MM3 (ref 4–10)

## 2018-08-21 PROCEDURE — 36415 COLL VENOUS BLD VENIPUNCTURE: CPT | Performed by: INTERNAL MEDICINE

## 2018-08-21 PROCEDURE — 85025 COMPLETE CBC W/AUTO DIFF WBC: CPT | Performed by: INTERNAL MEDICINE

## 2018-08-22 LAB
CARDIOLIPIN IGG SER IA-ACNC: <9 GPL U/ML (ref 0–14)
CARDIOLIPIN IGM SER IA-ACNC: 12 MPL U/ML (ref 0–12)

## 2018-08-27 NOTE — TELEPHONE ENCOUNTER
Due to her anxiety problem she really needs to change her butalbital to formulation without any caffeine. See if she is okay with this. If so refill for butablbital/apap 50/325 (without caffeine) for same sig/qty.  
Last OV 5/9/17  Next OV 9/12/17  Last RF 5/11/17 #60 +1  Last KSP On File  UDS On File  Contract On File    KD  
Pt will trial Butalbital-acetaminophen without caffeine per Dr. Valenzuela  
Walk in

## 2018-08-28 ENCOUNTER — OFFICE VISIT (OUTPATIENT)
Dept: ONCOLOGY | Facility: CLINIC | Age: 63
End: 2018-08-28

## 2018-08-28 ENCOUNTER — APPOINTMENT (OUTPATIENT)
Dept: LAB | Facility: HOSPITAL | Age: 63
End: 2018-08-28

## 2018-08-28 VITALS
TEMPERATURE: 97.9 F | HEIGHT: 64 IN | WEIGHT: 219.6 LBS | DIASTOLIC BLOOD PRESSURE: 60 MMHG | RESPIRATION RATE: 18 BRPM | HEART RATE: 63 BPM | OXYGEN SATURATION: 98 % | BODY MASS INDEX: 37.49 KG/M2 | SYSTOLIC BLOOD PRESSURE: 140 MMHG

## 2018-08-28 DIAGNOSIS — Z86.711 HISTORY OF PULMONARY EMBOLISM: Primary | ICD-10-CM

## 2018-08-28 PROCEDURE — 99213 OFFICE O/P EST LOW 20 MIN: CPT | Performed by: INTERNAL MEDICINE

## 2018-08-28 PROCEDURE — G0463 HOSPITAL OUTPT CLINIC VISIT: HCPCS | Performed by: INTERNAL MEDICINE

## 2018-08-28 NOTE — PROGRESS NOTES
Subjective     REASON FOR CONSULTATION:  1. Positive IgM anticardiolipin antibodies 3/19 AND 6/18  2.  Cutaneous. lupus and positive SIRI   3.  DVT and PE after neck surgery in 1998  4.  Recurrent UTI on vaginal estrogen therapy   5. ?  CVID on no therapy                        REQUESTING PHYSICIAN:  Kelly Cabrera M.D.    History of Present Illness patient is a 62-year-old lady with a positive IgM anticardiolipin antibody persistent from March through June comes in today to for reevaluation.  She is doing well overall and is scheduled to go to [place] for 10 days and we have gotten her some Arixtra for the trip itself and she knows to get up and be active during the plane ride.  Thankfully her repeat anticardiolipin antibodies totally normal which is surprising but  her risk is certainly lower at this point but I think we should check one more time in 3 months to make sure this is truly negative      Past Medical History:   Diagnosis Date   • Anxiety    • Anxiety and depression    • Aortic sclerosis     followed by Dr. Smith, Vascular Surgery   • Asthma     mild intermittent   • Clotting disorder (CMS/HCC)    • CVID (common variable immunodeficiency) (CMS/HCC)     started to have a bad reaction to IVIG after several years   • Deep vein thrombosis (CMS/HCC)     BEHIND LEFT KNEE, followed by Hematology Ramiro Benavides    • Depression    • Elevated alkaline phosphatase level     chronic, due to CVID   • Fatty liver 2006 ?   • Fibromyalgia    • History of pneumonia 1999   • Hyperlipidemia    • Hypothyroidism     followed by Endocrinology, Dr. Kuhn   • Lupus     cutaneous, followed by Rheumatology, Dr. Kelly Cabrera   • Migraines    • Multinodular goiter    • MVP (mitral valve prolapse)     followed by Cardiology, Dr. Bassett   • Osteoarthritis     followed by [facility]   • Osteoporosis    • PONV (postoperative nausea and vomiting)    • Prediabetes    • Pulmonary embolism (CMS/HCC) 1998    x 4 after neck  surgery, took coumadin x 10 yr but did not tolerate it   • Recurrent UTI    • Sleep apnea     CPAP Machine #11   • Tobacco abuse         Past Surgical History:   Procedure Laterality Date   • ABDOMINAL SURGERY  1993    Endometriosis   • CARDIAC CATHETERIZATION     • CHOLECYSTECTOMY     • COLONOSCOPY      Per pt unable to complete due to severely tortuous colon   • EPIDURAL BLOCK     • HYSTERECTOMY  1993    complete at age 38, endometriosis   • KNEE ARTHROSCOPY      calcium scraping   • NECK SURGERY  1998    C 5 AND 6 TITANIUM PLATE   • PARTIAL HYSTERECTOMY     • SKIN BIOPSY      benign      HEME HISTORY;  patient is a 62-year-old female with a 1 year history of a generalized arthralgia presenting initially with pseudogout in the left knee which was debilitating and for 3 months she was wheelchair-bound because of pain and immobility.  Symptoms quickly improved but then she has had arthralgias in her fingers and a lot of myalgias and was referred to Dr. Cabrera for evaluation because when she was at the [facility] being evaluated for recurrent UTIs 1 year ago she was found to have positive SIRI and RNP antibodies suspicious for underlying rheumatological illness.  She's been on plaquenil now for about a month for an unclassifiable rheumatological illness but also has a history of cutaneous lupus diagnosed 15 years ago    The patient has a complicated past history including diagnosis of common variable immunodeficiency in 2005.  She took IV IgG for a year but could not tolerate it and stopped in 1998 she had C-spine surgery with a plate in her neck and postoperatively developed DVT and PE and is better on Coumadin which she stayed on for 7 years and then discontinued in 2005 when her INR was extremely long and the risk of bleeding was felt to outweigh the risk of clotting.  She does not remember being told she had any hypercoagulable state and she saw Dr. Rodgers at that time for hematology.  She has never had a  recurrence of her DVT since 2005 and was started on low-dose vaginal estrogen in the [facility] for her recurrent UTIs and she uses a 3 times a week and has had no problems.  She has no family history of DVTs and has a sister with breast cancer at age 57.  5/18  patient is a 63-year-old lady with cutaneous lupus and a positive anticardiolipin antibody referred to us for evaluation who returns today after blood work was done at her last visit to look at the results.  Her anti-cardio lipid antibody is still persistently positive with a titer IgM of 56  and negative IgG her titer in March was 34.  The rest of her hypercoagulable workup including protein Cand S and antithrombin III and lupus anticoagulant were negative -factor V Leiden and prothrombin gene mutation were negative.  Anti-phosphatidylserine and IgM was elevated at 33 with a normal IgG.  Quantitative immunoglobulins were normal except for mild lowering of IgG of 633 despite being on no treatment for her common variable immunodeficiency and I'm not sure this is a true diagnosis.  Immunofixation showed no paraprotein    She has backed off on vaginal estrogen use which is probably safest for her but she is having some more hot flashes since backing off on the vaginal estrogens likely because of some systemic absorption of the estrogen.At this point there is no indication to anticoagulate preventatively but I did warn her that if she has another DVT we will have to keep her on lifelong anticoagulation with Coumadin.    She and her  are planning a trip to [place] in September and I recommended to Arixtra 2.5 mg subcutaneous for each leg of the flight and we will get this authorized through her insurance.        Current Outpatient Prescriptions on File Prior to Visit   Medication Sig Dispense Refill   • acebutolol (SECTRAL) 200 MG capsule Take 1 capsule by mouth Daily. 30 capsule 11   • ADVAIR DISKUS 500-50 MCG/DOSE DISKUS inhale 1 dose by mouth  twice a day 60 each 1   • ALPRAZolam (XANAX) 1 MG tablet Take 1 tablet by mouth 2 (Two) Times a Day As Needed for Anxiety. Do not fill prior to 8/6/18 60 tablet 0   • aspirin 81 MG chewable tablet Chew 81 mg Daily.     • atorvastatin (LIPITOR) 20 MG tablet Take 1 tablet by mouth Every Night. 30 tablet 11   • buPROPion SR (WELLBUTRIN SR) 150 MG 12 hr tablet take 1 tablet by mouth twice a day 60 tablet 1   • butalbital-acetaminophen-caffeine (ESGIC) -40 MG per tablet Take 1 tablet by mouth 2 (Two) Times a Day As Needed for Headache. 60 tablet 0   • cetirizine (ZYRTEC ALLERGY) 10 MG tablet Take 10 mg by mouth daily.     • Cholecalciferol (VITAMIN D3) 2000 UNITS tablet Take 2,000 Units by mouth Daily.     • Cranberry 300 MG tablet Take 300 mg by mouth Daily.     • Docusate Sodium 100 MG capsule Take 200 mg by mouth Daily. 60 tablet 3   • ESTRACE VAGINAL 0.1 MG/GM vaginal cream      • FLUoxetine (PROzac) 60 MG tablet Take 1 tablet by mouth Daily. 30 tablet 5   • fluticasone (FLONASE) 50 MCG/ACT nasal spray 2 sprays into each nostril Daily. 1 bottle 2   • folic acid (FOLVITE) 1 MG tablet Take 1 tablet by mouth Daily. 30 tablet 5   • fondaparinux (ARIXTRA) 2.5 MG/0.5ML solution injection Inject 0.5 mL under the skin Daily. 1 mL 2   • hydroxychloroquine (PLAQUENIL) 200 MG tablet      • ipratropium-albuterol (DUO-NEB) 0.5-2.5 mg/3 ml nebulizer Take 3 mL by nebulization Every 4 (Four) Hours As Needed for Wheezing. 360 mL 1   • levothyroxine (SYNTHROID, LEVOTHROID) 125 MCG tablet take 1/2 tablet by mouth once daily (Patient taking differently: 65mg) 30 tablet 3   • montelukast (SINGULAIR) 10 MG tablet Take 10 mg by mouth Every Night.     • Multiple Vitamins-Minerals (STRESS TAB NF PO) Take  by mouth. vitamin     • Omega-3 Fatty Acids (FISH OIL PO) Take  by mouth Daily.     • PROAIR  (90 Base) MCG/ACT inhaler inhale 2 puffs by mouth four times a day if needed 8.5 g 1     No current facility-administered medications  on file prior to visit.         ALLERGIES:    Allergies   Allergen Reactions   • Codeine Nausea And Vomiting   • Zofran [Ondansetron Hcl] Other (See Comments)     CONSTIPATION        Social History     Social History   • Marital status:    • Years of education: High school     Occupational History   • Unemployed currently Retired     Social History Main Topics   • Smoking status: Current Every Day Smoker     Packs/day: 1.00     Years: 30.00     Types: Cigarettes   • Smokeless tobacco: Never Used   • Alcohol use No   • Drug use: No   • Sexual activity: Yes     Partners: Male     Birth control/ protection: Post-menopausal, Other      Comment: Hysterectomy     Other Topics Concern   • Not on file        Family History   Problem Relation Age of Onset   • Thyroid disease Mother         Hypothyroid   • ALS Mother    • Anxiety disorder Mother    • Aortic aneurysm Father    • Heart disease Father    • Breast cancer Sister 50   • Cerebral aneurysm Sister    • Depression Sister    • Hypertension Sister    • Diabetes Sister    • Thyroid cancer Cousin    • Cerebral aneurysm Sister    • Depression Sister    • Hypertension Sister    • Diabetes Sister    • Arthritis Brother    • Hypertension Brother    • Colon polyps Brother         fine   • Cancer Sister         Breast   • Depression Sister    • Depression Sister    • Thyroid disease Sister         Hyperthyroid   • Depression Sister    • Psychosis Maternal Grandfather    • Depression Daughter    • Malig Hyperthermia Neg Hx         Review of Systems   Constitutional: Positive for unexpected weight change (Weight gain).   Respiratory: Positive for shortness of breath (Due to asthma).    Musculoskeletal: Positive for arthralgias, back pain, gait problem and joint swelling.   Neurological: Positive for dizziness and headaches.        Objective     There were no vitals filed for this visit.  Current Status 6/5/2018   ECOG score 0       Physical Exam    GENERAL:  Well-developed,  well-nourished in no acute distress.   SKIN:  Warm, dry without rashes, purpura or petechiae.  EYES:  Pupils equal, round and reactive to light.  EOMs intact.  Conjunctivae normal.  EARS:  Hearing intact.  NOSE:  Septum midline.  No excoriations or nasal discharge.  MOUTH:  Tongue is well-papillated; no stomatitis or ulcers.  Lips normal.  THROAT:  Oropharynx without lesions or exudates.  NECK:  Supple with good range of motion; no thyromegaly or masses, no JVD.  LYMPHATICS:  No cervical, supraclavicular, axillary or inguinal adenopathy.  CHEST:  Lungs clear to auscultation. Good airflow.  CARDIAC:  Regular rate and rhythm without murmurs, rubs or gallops. Normal S1,S2.  ABDOMEN:  Soft, nontender with no hepatosplenomegaly or masses.  EXTREMITIES:  No clubbing, cyanosis or edema.Some ulnar deviation of her fingers on the left hand and thumb synovitis in her wrists  NEUROLOGICAL:  Cranial Nerves II-XII grossly intact.  No focal neurological deficits.  PSYCHIATRIC:  Normal affect and mood.        RECENT LABS:  Hematology WBC   Date Value Ref Range Status   08/21/2018 4.08 4.00 - 10.00 10*3/mm3 Final     RBC   Date Value Ref Range Status   08/21/2018 4.25 3.90 - 5.00 10*6/mm3 Final     Hemoglobin   Date Value Ref Range Status   08/21/2018 13.6 11.5 - 14.9 g/dL Final     Hematocrit   Date Value Ref Range Status   08/21/2018 40.9 34.0 - 45.0 % Final     Platelets   Date Value Ref Range Status   08/21/2018 171 150 - 375 10*3/mm3 Final        CT of the abdomen and pelvis dated 12/16 shows no hepatosplenomegaly or kidney masses    IgM anticardiolipin antibody was elevated at 30-repeat testing in 5 /18 was 56   Lupus anticoagulant was negative and anti-beta 2 glycoprotein antibodies negative    Assessment/Plan   1.  History of provoked DVT and pulmonary embolism in 1998 treated with 7 years of  coumadin  2.  History of cutaneous lupus with positive SIRI and RNP.Anticard AB  and arthralgias?  Atypical SLE  3.  Positive IgM  anticardiolipin antibody intermediate-persistent-resolved as of 8/18  4.  Family history of breast cancer  5.  History of CVID with fairly normal immunoglobulin levels    Plan  1.   Minimize estrogen use   2.  Return in 3 months with one more repeat anticardiolipin antibody and we would recommend Arixtra for her flight to [place] 2.5 mg subcutaneous going and  coming

## 2018-08-29 DIAGNOSIS — F32.A ANXIETY AND DEPRESSION: ICD-10-CM

## 2018-08-29 DIAGNOSIS — F41.9 ANXIETY AND DEPRESSION: ICD-10-CM

## 2018-08-29 DIAGNOSIS — Z00.00 HEALTHCARE MAINTENANCE: ICD-10-CM

## 2018-08-29 RX ORDER — FOLIC ACID 1 MG/1
TABLET ORAL
Qty: 30 TABLET | Refills: 1 | Status: SHIPPED | OUTPATIENT
Start: 2018-08-29 | End: 2018-10-09 | Stop reason: SDUPTHER

## 2018-08-29 RX ORDER — FLUOXETINE HYDROCHLORIDE 60 MG/1
TABLET, FILM COATED ORAL; ORAL
Qty: 30 TABLET | Refills: 1 | Status: SHIPPED | OUTPATIENT
Start: 2018-08-29 | End: 2018-10-09 | Stop reason: SDUPTHER

## 2018-09-04 DIAGNOSIS — E03.8 SUBCLINICAL HYPOTHYROIDISM: ICD-10-CM

## 2018-09-04 RX ORDER — LEVOTHYROXINE SODIUM 0.12 MG/1
TABLET ORAL
Qty: 15 TABLET | Refills: 1 | Status: SHIPPED | OUTPATIENT
Start: 2018-09-04 | End: 2018-11-13 | Stop reason: SDUPTHER

## 2018-09-09 DIAGNOSIS — F41.9 ANXIETY: Primary | ICD-10-CM

## 2018-09-09 RX ORDER — CLONAZEPAM 0.5 MG/1
0.5 TABLET ORAL 2 TIMES DAILY PRN
Qty: 60 TABLET | Refills: 0 | Status: SHIPPED | OUTPATIENT
Start: 2018-09-09 | End: 2018-10-09

## 2018-09-20 DIAGNOSIS — G43.819 OTHER MIGRAINE WITHOUT STATUS MIGRAINOSUS, INTRACTABLE: ICD-10-CM

## 2018-09-20 RX ORDER — BUTALBITAL, ACETAMINOPHEN AND CAFFEINE 50; 325; 40 MG/1; MG/1; MG/1
TABLET ORAL
Qty: 60 TABLET | Refills: 0 | Status: SHIPPED | OUTPATIENT
Start: 2018-09-20 | End: 2018-10-09 | Stop reason: SDUPTHER

## 2018-10-02 RX ORDER — MONTELUKAST SODIUM 10 MG/1
10 TABLET ORAL NIGHTLY
Qty: 30 TABLET | Refills: 2 | Status: SHIPPED | OUTPATIENT
Start: 2018-10-02 | End: 2018-12-27 | Stop reason: SDUPTHER

## 2018-10-09 ENCOUNTER — OFFICE VISIT (OUTPATIENT)
Dept: FAMILY MEDICINE CLINIC | Facility: CLINIC | Age: 63
End: 2018-10-09

## 2018-10-09 VITALS
OXYGEN SATURATION: 99 % | BODY MASS INDEX: 37.05 KG/M2 | SYSTOLIC BLOOD PRESSURE: 122 MMHG | HEART RATE: 67 BPM | HEIGHT: 64 IN | WEIGHT: 217 LBS | DIASTOLIC BLOOD PRESSURE: 84 MMHG | RESPIRATION RATE: 14 BRPM

## 2018-10-09 DIAGNOSIS — F41.9 ANXIETY AND DEPRESSION: ICD-10-CM

## 2018-10-09 DIAGNOSIS — F32.A ANXIETY AND DEPRESSION: ICD-10-CM

## 2018-10-09 DIAGNOSIS — Z78.0 POSTMENOPAUSAL: ICD-10-CM

## 2018-10-09 DIAGNOSIS — Z00.00 ANNUAL PHYSICAL EXAM: Primary | ICD-10-CM

## 2018-10-09 DIAGNOSIS — M81.0 OSTEOPOROSIS, UNSPECIFIED OSTEOPOROSIS TYPE, UNSPECIFIED PATHOLOGICAL FRACTURE PRESENCE: ICD-10-CM

## 2018-10-09 DIAGNOSIS — G43.819 OTHER MIGRAINE WITHOUT STATUS MIGRAINOSUS, INTRACTABLE: ICD-10-CM

## 2018-10-09 DIAGNOSIS — Z00.00 HEALTHCARE MAINTENANCE: ICD-10-CM

## 2018-10-09 DIAGNOSIS — J45.909 UNCOMPLICATED ASTHMA, UNSPECIFIED ASTHMA SEVERITY, UNSPECIFIED WHETHER PERSISTENT: ICD-10-CM

## 2018-10-09 DIAGNOSIS — R19.5 FECAL OCCULT BLOOD TEST POSITIVE: ICD-10-CM

## 2018-10-09 PROCEDURE — 99213 OFFICE O/P EST LOW 20 MIN: CPT | Performed by: FAMILY MEDICINE

## 2018-10-09 PROCEDURE — 99396 PREV VISIT EST AGE 40-64: CPT | Performed by: FAMILY MEDICINE

## 2018-10-09 RX ORDER — FLUOXETINE HYDROCHLORIDE 60 MG/1
60 TABLET, FILM COATED ORAL; ORAL DAILY
Qty: 30 TABLET | Refills: 11 | Status: SHIPPED | OUTPATIENT
Start: 2018-10-09 | End: 2019-08-23 | Stop reason: SDUPTHER

## 2018-10-09 RX ORDER — BUTALBITAL, ACETAMINOPHEN AND CAFFEINE 50; 325; 40 MG/1; MG/1; MG/1
1 TABLET ORAL EVERY 6 HOURS PRN
Qty: 60 TABLET | Refills: 0 | Status: SHIPPED | OUTPATIENT
Start: 2018-10-09 | End: 2018-11-07 | Stop reason: SDUPTHER

## 2018-10-09 RX ORDER — FOLIC ACID 1 MG/1
1000 TABLET ORAL DAILY
Qty: 30 TABLET | Refills: 1 | Status: SHIPPED | OUTPATIENT
Start: 2018-10-09 | End: 2019-01-02 | Stop reason: SDUPTHER

## 2018-10-09 RX ORDER — DEXAMETHASONE SODIUM PHOSPHATE 10 MG/ML
10 INJECTION INTRAMUSCULAR; INTRAVENOUS ONCE
Status: DISCONTINUED | OUTPATIENT
Start: 2018-10-09 | End: 2019-04-09

## 2018-10-09 RX ORDER — PROMETHAZINE HYDROCHLORIDE 25 MG/ML
25 INJECTION, SOLUTION INTRAMUSCULAR; INTRAVENOUS ONCE
Status: DISCONTINUED | OUTPATIENT
Start: 2018-10-09 | End: 2019-04-09

## 2018-10-09 RX ORDER — ALPRAZOLAM 1 MG/1
1 TABLET ORAL 2 TIMES DAILY PRN
Qty: 60 TABLET | Refills: 0 | Status: SHIPPED | OUTPATIENT
Start: 2018-10-09 | End: 2018-11-07 | Stop reason: SDUPTHER

## 2018-10-09 NOTE — PROGRESS NOTES
Ida Mcgee is a 63 y.o. female. Patient is here today for   Chief Complaint   Patient presents with   • Annual Exam     Physical   • Headache                HPI    Headache:  -bitemporal  -started yesterday  -she has taken fioricet and joon seltzer cold plus this morning with no improvement  -no nausea  -mild photophobia  -change in weather often triggers migraines  -she has a ride home and would like to get a migraine cocktail while here today    Health Habits:  Dental Exam. up to date  Eye Exam. up to date  Exercise: 0 times/week.  Current exercise activities include: none, activity limited by chronic low back and bilateral hip pain, due to spinal DJD and sciatica; she already follows with Ortho  No longer getting pap smears since she is s/p complete hysterectomy  Mammogram UTD  DEXA is due  Flu and shingrix vaccines are not available today  Pt believes her tetanus vaccination is UTD within the last 8 yr  Anxiety somewhat worse since switching from xanax to clonazepam, so she would like to switch back   She declines CT chest to screen for lung cancer at this time  Pt declines to discuss tobacco cessation   FOBT positive 4 months ago  Pt is afraid to undergo a colonoscopy due to a hx of tortuous colon and near-perforation in the past  She is not fasting today    The following portions of the patient's history were reviewed and updated as appropriate: allergies, current medications, past family history, past medical history, past social history, past surgical history and problem list.    Past Medical History:   Diagnosis Date   • Anxiety    • Anxiety and depression    • Aortic sclerosis     followed by Dr. Smith, Vascular Surgery   • Asthma     mild intermittent   • Clotting disorder (CMS/HCC)    • COPD (chronic obstructive pulmonary disease) (CMS/HCC)    • CVID (common variable immunodeficiency) (CMS/HCC)     started to have a bad reaction to IVIG after several years   • Deep vein thrombosis (CMS/HCC)      BEHIND LEFT KNEE, followed by Hematology Ramiro Benavides    • Depression    • DJD (degenerative joint disease), lumbar    • Elevated alkaline phosphatase level     chronic, due to CVID   • Fatty liver 2006 ?   • Fibromyalgia    • History of pneumonia 1999   • Hyperlipidemia    • Hypothyroidism     followed by Endocrinology, Dr. Kuhn   • Lupus     cutaneous, followed by Rheumatology, Dr. Mendoza June   • Migraines    • Multinodular goiter    • MVP (mitral valve prolapse)     followed by Cardiology, Dr. Bassett   • Osteoarthritis     followed by [facility], Dr. Hugo Costa   • Osteoporosis    • PONV (postoperative nausea and vomiting)    • Prediabetes    • Pulmonary embolism (CMS/HCC) 1998    x 4 after neck surgery, took coumadin x 10 yr but did not tolerate it   • Recurrent UTI    • Sciatica    • Sleep apnea     CPAP Machine #11   • Tobacco abuse       Past Surgical History:   Procedure Laterality Date   • ABDOMINAL SURGERY  1993    Endometriosis   • CARDIAC CATHETERIZATION     • CHOLECYSTECTOMY     • COLONOSCOPY      Per pt unable to complete due to severely tortuous colon   • EPIDURAL BLOCK     • HYSTERECTOMY  1993    complete at age 38, endometriosis   • KNEE ARTHROSCOPY      calcium scraping   • NECK SURGERY  1998    C 5 AND 6 TITANIUM PLATE   • PARTIAL HYSTERECTOMY     • SKIN BIOPSY      benign     Family History   Problem Relation Age of Onset   • Thyroid disease Mother         Hypothyroid   • ALS Mother    • Anxiety disorder Mother    • Aortic aneurysm Father    • Heart disease Father    • Breast cancer Sister 50   • Cerebral aneurysm Sister    • Depression Sister    • Hypertension Sister    • Diabetes Sister    • Thyroid cancer Cousin    • Cerebral aneurysm Sister    • Depression Sister    • Hypertension Sister    • Diabetes Sister    • Arthritis Brother    • Hypertension Brother    • Colon polyps Brother         fine   • Cancer Sister         Breast   • Depression Sister    •  Depression Sister    • Thyroid disease Sister         Hyperthyroid   • Depression Sister    • Psychosis Maternal Grandfather    • Depression Daughter    • Malig Hyperthermia Neg Hx        Allergies   Allergen Reactions   • Codeine Nausea And Vomiting   • Zofran [Ondansetron Hcl] Other (See Comments)     CONSTIPATION      Social History     Social History   • Marital status:      Spouse name: N/A   • Number of children: N/A   • Years of education: High school     Occupational History   • Unemployed currently Retired     Social History Main Topics   • Smoking status: Current Every Day Smoker     Packs/day: 1.00     Years: 30.00     Types: Cigarettes   • Smokeless tobacco: Never Used   • Alcohol use No   • Drug use: No   • Sexual activity: Yes     Partners: Male     Birth control/ protection: Post-menopausal, Other      Comment: Hysterectomy     Other Topics Concern   • Not on file     Social History Narrative   • No narrative on file      Outpatient Medications Prior to Visit   Medication Sig Dispense Refill   • acebutolol (SECTRAL) 200 MG capsule Take 1 capsule by mouth Daily. 30 capsule 11   • aspirin 81 MG chewable tablet Chew 81 mg Daily.     • atorvastatin (LIPITOR) 20 MG tablet Take 1 tablet by mouth Every Night. 30 tablet 11   • cetirizine (ZYRTEC ALLERGY) 10 MG tablet Take 10 mg by mouth daily.     • Cholecalciferol (VITAMIN D3) 2000 UNITS tablet Take 2,000 Units by mouth Daily.     • Docusate Sodium 100 MG capsule Take 200 mg by mouth Daily. 60 tablet 3   • ESTRACE VAGINAL 0.1 MG/GM vaginal cream      • fluticasone (FLONASE) 50 MCG/ACT nasal spray 2 sprays into each nostril Daily. 1 bottle 2          • hydroxychloroquine (PLAQUENIL) 200 MG tablet      • ipratropium-albuterol (DUO-NEB) 0.5-2.5 mg/3 ml nebulizer Take 3 mL by nebulization Every 4 (Four) Hours As Needed for Wheezing. 360 mL 1   • levothyroxine (SYNTHROID, LEVOTHROID) 125 MCG tablet take 1/2 tablet by mouth every morning ON AN EMPTY STOMACH  15 tablet 1   • montelukast (SINGULAIR) 10 MG tablet Take 1 tablet by mouth Every Night. 30 tablet 2   • Multiple Vitamins-Minerals (STRESS TAB NF PO) Take  by mouth. vitamin     • Omega-3 Fatty Acids (FISH OIL PO) Take  by mouth Daily.     • PROAIR  (90 Base) MCG/ACT inhaler inhale 2 puffs by mouth four times a day if needed 8.5 g 1   • ADVAIR DISKUS 500-50 MCG/DOSE DISKUS inhale 1 dose by mouth twice a day 60 each 0   • butalbital-acetaminophen-caffeine (FIORICET, ESGIC) -40 MG per tablet take 1 tablet by mouth twice a day if needed for headache 60 tablet 0   • clonazePAM (KlonoPIN) 0.5 MG tablet Take 1 tablet by mouth 2 (Two) Times a Day As Needed for Anxiety. 60 tablet 0   • FLUoxetine (PROzac) 60 MG tablet take 1 tablet by mouth once daily 30 tablet 1   • folic acid (FOLVITE) 1 MG tablet take 1 tablet by mouth once daily 30 tablet 1     No facility-administered medications prior to visit.           Review of Systems   Constitutional: Negative for fatigue and fever.   HENT: Negative for congestion and sore throat.    Eyes: Positive for photophobia. Negative for pain and visual disturbance.   Respiratory: Negative for cough and shortness of breath.    Cardiovascular: Negative for chest pain and palpitations.   Gastrointestinal: Negative for abdominal pain and nausea.   Musculoskeletal: Positive for arthralgias and gait problem.   Skin: Negative for rash and wound.   Neurological: Positive for headaches. Negative for dizziness, syncope and weakness.        NO falls     Psychiatric/Behavioral: Negative for dysphoric mood and suicidal ideas. The patient is nervous/anxious.          Objective       Vitals:    10/09/18 1049   BP: 122/84   Pulse: 67   Resp: 14   SpO2: 99%           Physical Exam   Constitutional: She appears well-developed and well-nourished. No distress.   HENT:   Head: Normocephalic and atraumatic.   Eyes: Pupils are equal, round, and reactive to light. EOM are normal.   Cardiovascular:  Normal rate, regular rhythm and normal heart sounds.  Exam reveals no gallop and no friction rub.    No murmur heard.  Pulmonary/Chest: Effort normal and breath sounds normal. No respiratory distress. She has no wheezes. She has no rhonchi. She has no rales.   Abdominal: Soft. Bowel sounds are normal. She exhibits no distension. There is no tenderness.   Neurological: She has normal strength. Gait (antalgic) abnormal.   Skin: Skin is warm and dry.       ASSESSMENT/PLAN       Problem List Items Addressed This Visit        Musculoskeletal and Integument    Osteoporosis    Relevant Orders    DEXA Bone Density Axial       Other    Anxiety and depression    Relevant Medications    ALPRAZolam (XANAX) 1 MG tablet    FLUoxetine (PROzac) 60 MG tablet    Fecal occult blood test positive    Relevant Orders    Ambulatory Referral to Gastroenterology  Pt strongly encouraged to discuss repeat colonoscopy with GI      Other Visit Diagnoses     Annual physical exam    -  Primary    Relevant Orders    DEXA Bone Density Axial    Ambulatory Referral to Gastroenterology    Healthcare maintenance        Relevant Medications    folic acid (FOLVITE) 1 MG tablet    Other Relevant Orders    DEXA Bone Density Axial    Other migraine with status migrainosus, intractable        Relevant Medications    butalbital-acetaminophen-caffeine (FIORICET, ESGIC) -40 MG per tablet      Migraine cocktail administered IM:    promethazine (PHENERGAN) injection 25 mg    dexamethasone (DECADRON) injection 10 mg  Pt advised not to drive for the remainder of the day      Uncomplicated asthma, unspecified asthma severity, unspecified whether persistent        Relevant Medications    fluticasone-salmeterol (ADVAIR DISKUS) 500-50 MCG/DOSE DISKUS    Postmenopausal        Relevant Orders    DEXA Bone Density Axial            Patient Instructions   Don't forget to get a flu shot this month!      Ask your pharmacist about the Shingrix, the new shingles  vaccine.          Return in about 6 months (around 4/9/2019) for schedule fasting labs within the next month.

## 2018-10-09 NOTE — PATIENT INSTRUCTIONS
Don't forget to get a flu shot this month!      Ask your pharmacist about the Shingrix, the new shingles vaccine.

## 2018-10-17 DIAGNOSIS — R19.5 FECAL OCCULT BLOOD TEST POSITIVE: Primary | ICD-10-CM

## 2018-10-22 ENCOUNTER — APPOINTMENT (OUTPATIENT)
Dept: BONE DENSITY | Facility: HOSPITAL | Age: 63
End: 2018-10-22

## 2018-11-02 ENCOUNTER — HOSPITAL ENCOUNTER (OUTPATIENT)
Dept: BONE DENSITY | Facility: HOSPITAL | Age: 63
End: 2018-11-02

## 2018-11-06 DIAGNOSIS — F41.8 MIXED ANXIETY AND DEPRESSIVE DISORDER: ICD-10-CM

## 2018-11-06 DIAGNOSIS — G43.819 OTHER MIGRAINE WITHOUT STATUS MIGRAINOSUS, INTRACTABLE: ICD-10-CM

## 2018-11-06 RX ORDER — BUTALBITAL, ACETAMINOPHEN AND CAFFEINE 50; 325; 40 MG/1; MG/1; MG/1
TABLET ORAL
Qty: 60 TABLET | Refills: 0 | Status: CANCELLED | OUTPATIENT
Start: 2018-11-06

## 2018-11-07 DIAGNOSIS — F41.9 ANXIETY AND DEPRESSION: ICD-10-CM

## 2018-11-07 DIAGNOSIS — G43.819 OTHER MIGRAINE WITHOUT STATUS MIGRAINOSUS, INTRACTABLE: ICD-10-CM

## 2018-11-07 DIAGNOSIS — F32.A ANXIETY AND DEPRESSION: ICD-10-CM

## 2018-11-07 RX ORDER — ALPRAZOLAM 1 MG/1
1 TABLET ORAL 2 TIMES DAILY PRN
Qty: 60 TABLET | Refills: 0 | Status: SHIPPED | OUTPATIENT
Start: 2018-11-07 | End: 2018-12-07 | Stop reason: SDUPTHER

## 2018-11-07 RX ORDER — BUTALBITAL, ACETAMINOPHEN AND CAFFEINE 50; 325; 40 MG/1; MG/1; MG/1
1 TABLET ORAL EVERY 6 HOURS PRN
Qty: 60 TABLET | Refills: 0 | Status: SHIPPED | OUTPATIENT
Start: 2018-11-07 | End: 2018-12-07 | Stop reason: SDUPTHER

## 2018-11-08 ENCOUNTER — OFFICE VISIT (OUTPATIENT)
Dept: SLEEP MEDICINE | Facility: HOSPITAL | Age: 63
End: 2018-11-08
Attending: INTERNAL MEDICINE

## 2018-11-08 VITALS
HEIGHT: 64 IN | BODY MASS INDEX: 37.32 KG/M2 | HEART RATE: 65 BPM | DIASTOLIC BLOOD PRESSURE: 51 MMHG | WEIGHT: 218.6 LBS | SYSTOLIC BLOOD PRESSURE: 134 MMHG

## 2018-11-08 DIAGNOSIS — Z99.89 OSA ON CPAP: Primary | ICD-10-CM

## 2018-11-08 DIAGNOSIS — G47.33 OSA ON CPAP: Primary | ICD-10-CM

## 2018-11-08 PROCEDURE — 99213 OFFICE O/P EST LOW 20 MIN: CPT | Performed by: INTERNAL MEDICINE

## 2018-11-08 PROCEDURE — G0463 HOSPITAL OUTPT CLINIC VISIT: HCPCS

## 2018-11-08 RX ORDER — ALPRAZOLAM 1 MG/1
TABLET ORAL
Qty: 60 TABLET | Refills: 0 | OUTPATIENT
Start: 2018-11-08

## 2018-11-08 NOTE — PROGRESS NOTES
"[facility] Note     Chief Complaint:  DARRION     Primary Care Physician: Latanya Hickman MD    Interval History:   I last saw the patient one year ago.  She is stable.  She goes to bed at 10 PM and awakens at 7 AM.  [place] Sleepiness Scale is normal at 1 or 2.    She states she's been diagnosed with lupus.    Review of Systems:  Recorded on the Sleep Questionnaire.  Unremarkable except for nasal congestion    Social History:    Social History     Social History   • Marital status:    • Years of education: High school     Occupational History   • Unemployed currently Retired     Social History Main Topics   • Smoking status: Current Every Day Smoker     Packs/day: 1.00     Years: 30.00     Types: Cigarettes   • Smokeless tobacco: Never Used   • Alcohol use No   • Drug use: No   • Sexual activity: Yes     Partners: Male     Birth control/ protection: Post-menopausal, Other      Comment: Hysterectomy     Other Topics Concern   • Not on file       Allergies:  Codeine and Zofran [ondansetron hcl]     Medication Review:  Reviewed.      Vital Signs:    Vitals:    11/08/18 1100   BP: 134/51   Pulse: 65   Weight: 99.2 kg (218 lb 9.6 oz)   Height: 162.6 cm (64\")     Body mass index is 37.52 kg/m².    Physical Exam:    Constitutional:  Well developed white female and appears in no apparent distress.  Awake & oriented times 3.  Normal mood with normal recent and remote memory and normal judgement.  Eyes:  Conjunctivae normal.  Oropharynx:  moist mucous membranes without exudate and a large tongue and large uvula and the uvula is slightly deformed and deviated to the left and she has a patent posterior pharyngeal opening and class II-III MP airway      Results Review:  DME is [employer] and she uses a fullface mask.  Downloads between August 10 and November 7, 2018 compliances 100%.  Average usage is 8 hours and 44 minutes.  Average AHI is normal without leak.  Average AutoCPAP pressure is " 17.8 and her auto CPAP is 12-20.  With weight gain, I showed her how her pressures are higher than they were last year.       Impression:   Obstructive sleep apnea adequately treated with auto titrating CPAP with good compliance and usage and no complaints of hypersomnolence      Plan:  Good sleep hygiene measures should be maintained.  Weight loss would be beneficial in this patient who is obese by BMI.  The patient has gained about 20 pounds since I last saw her.  The patient is benefiting from the treatment being provided.     The patient will continue auto CPAP.    The patient will call for any problems and will follow up in one year.      Hector Trujillo MD  Sleep Medicine  11/08/18  11:25 AM

## 2018-11-13 DIAGNOSIS — E03.8 SUBCLINICAL HYPOTHYROIDISM: ICD-10-CM

## 2018-11-13 RX ORDER — LEVOTHYROXINE SODIUM 0.12 MG/1
TABLET ORAL
Qty: 30 TABLET | Refills: 2 | Status: SHIPPED | OUTPATIENT
Start: 2018-11-13 | End: 2019-05-07 | Stop reason: SDUPTHER

## 2018-11-20 ENCOUNTER — LAB (OUTPATIENT)
Dept: LAB | Facility: HOSPITAL | Age: 63
End: 2018-11-20

## 2018-11-20 DIAGNOSIS — Z86.711 HISTORY OF PULMONARY EMBOLISM: ICD-10-CM

## 2018-11-20 LAB
BASOPHILS # BLD AUTO: 0.03 10*3/MM3 (ref 0–0.1)
BASOPHILS NFR BLD AUTO: 0.7 % (ref 0–1.1)
DEPRECATED RDW RBC AUTO: 44.6 FL (ref 37–49)
EOSINOPHIL # BLD AUTO: 0.06 10*3/MM3 (ref 0–0.36)
EOSINOPHIL NFR BLD AUTO: 1.3 % (ref 1–5)
ERYTHROCYTE [DISTWIDTH] IN BLOOD BY AUTOMATED COUNT: 12.6 % (ref 11.7–14.5)
HCT VFR BLD AUTO: 39.8 % (ref 34–45)
HGB BLD-MCNC: 13.4 G/DL (ref 11.5–14.9)
IMM GRANULOCYTES # BLD: 0.02 10*3/MM3 (ref 0–0.03)
IMM GRANULOCYTES NFR BLD: 0.4 % (ref 0–0.5)
LYMPHOCYTES # BLD AUTO: 1.69 10*3/MM3 (ref 1–3.5)
LYMPHOCYTES NFR BLD AUTO: 38 % (ref 20–49)
MCH RBC QN AUTO: 32.6 PG (ref 27–33)
MCHC RBC AUTO-ENTMCNC: 33.7 G/DL (ref 32–35)
MCV RBC AUTO: 96.8 FL (ref 83–97)
MONOCYTES # BLD AUTO: 0.46 10*3/MM3 (ref 0.25–0.8)
MONOCYTES NFR BLD AUTO: 10.3 % (ref 4–12)
NEUTROPHILS # BLD AUTO: 2.19 10*3/MM3 (ref 1.5–7)
NEUTROPHILS NFR BLD AUTO: 49.3 % (ref 39–75)
NRBC BLD MANUAL-RTO: 0 /100 WBC (ref 0–0)
PLATELET # BLD AUTO: 177 10*3/MM3 (ref 150–375)
PMV BLD AUTO: 9.2 FL (ref 8.9–12.1)
RBC # BLD AUTO: 4.11 10*6/MM3 (ref 3.9–5)
WBC NRBC COR # BLD: 4.45 10*3/MM3 (ref 4–10)

## 2018-11-20 PROCEDURE — 36415 COLL VENOUS BLD VENIPUNCTURE: CPT | Performed by: INTERNAL MEDICINE

## 2018-11-20 PROCEDURE — 85025 COMPLETE CBC W/AUTO DIFF WBC: CPT

## 2018-11-21 ENCOUNTER — HOSPITAL ENCOUNTER (OUTPATIENT)
Dept: BONE DENSITY | Facility: HOSPITAL | Age: 63
Discharge: HOME | End: 2018-11-21
Admitting: FAMILY MEDICINE

## 2018-11-21 LAB
CARDIOLIPIN IGG SER IA-ACNC: <9 GPL U/ML (ref 0–14)
CARDIOLIPIN IGM SER IA-ACNC: 15 MPL U/ML (ref 0–12)

## 2018-11-21 PROCEDURE — 77080 DXA BONE DENSITY AXIAL: CPT

## 2018-11-26 ENCOUNTER — PATIENT MESSAGE (OUTPATIENT)
Dept: FAMILY MEDICINE CLINIC | Facility: CLINIC | Age: 63
End: 2018-11-26

## 2018-11-26 DIAGNOSIS — J45.909 UNCOMPLICATED ASTHMA, UNSPECIFIED ASTHMA SEVERITY, UNSPECIFIED WHETHER PERSISTENT: ICD-10-CM

## 2018-12-07 DIAGNOSIS — F32.A ANXIETY AND DEPRESSION: ICD-10-CM

## 2018-12-07 DIAGNOSIS — F41.9 ANXIETY AND DEPRESSION: ICD-10-CM

## 2018-12-07 DIAGNOSIS — G43.819 OTHER MIGRAINE WITHOUT STATUS MIGRAINOSUS, INTRACTABLE: ICD-10-CM

## 2018-12-07 RX ORDER — ALPRAZOLAM 1 MG/1
TABLET ORAL
Qty: 60 TABLET | Refills: 0 | Status: SHIPPED | OUTPATIENT
Start: 2018-12-07 | End: 2019-01-23 | Stop reason: SDUPTHER

## 2018-12-07 RX ORDER — BUTALBITAL, ACETAMINOPHEN AND CAFFEINE 50; 325; 40 MG/1; MG/1; MG/1
TABLET ORAL
Qty: 60 TABLET | Refills: 0 | Status: SHIPPED | OUTPATIENT
Start: 2018-12-07 | End: 2019-01-02 | Stop reason: SDUPTHER

## 2018-12-20 ENCOUNTER — RESULTS ENCOUNTER (OUTPATIENT)
Dept: ENDOCRINOLOGY | Age: 63
End: 2018-12-20

## 2018-12-20 DIAGNOSIS — E03.9 HYPOTHYROIDISM (ACQUIRED): Primary | ICD-10-CM

## 2018-12-20 DIAGNOSIS — E03.9 HYPOTHYROIDISM (ACQUIRED): ICD-10-CM

## 2018-12-20 DIAGNOSIS — E05.90 SUBCLINICAL HYPERTHYROIDISM: ICD-10-CM

## 2018-12-21 LAB
ALBUMIN SERPL-MCNC: 4.5 G/DL (ref 3.5–5.2)
ALBUMIN/GLOB SERPL: 2.1 G/DL
ALP SERPL-CCNC: 178 U/L (ref 39–117)
ALT SERPL-CCNC: 21 U/L (ref 1–33)
AST SERPL-CCNC: 14 U/L (ref 1–32)
BILIRUB SERPL-MCNC: 0.2 MG/DL (ref 0.1–1.2)
BUN SERPL-MCNC: 10 MG/DL (ref 8–23)
BUN/CREAT SERPL: 11.9 (ref 7–25)
CALCIUM SERPL-MCNC: 9.7 MG/DL (ref 8.6–10.5)
CHLORIDE SERPL-SCNC: 101 MMOL/L (ref 98–107)
CO2 SERPL-SCNC: 26.5 MMOL/L (ref 22–29)
CREAT SERPL-MCNC: 0.84 MG/DL (ref 0.57–1)
GLOBULIN SER CALC-MCNC: 2.1 GM/DL
GLUCOSE SERPL-MCNC: 103 MG/DL (ref 65–99)
POTASSIUM SERPL-SCNC: 4.4 MMOL/L (ref 3.5–5.2)
PROT SERPL-MCNC: 6.6 G/DL (ref 6–8.5)
SODIUM SERPL-SCNC: 139 MMOL/L (ref 136–145)
T3 SERPL-MCNC: 156.6 NG/DL (ref 80–200)
T4 FREE SERPL-MCNC: 1.19 NG/DL (ref 0.93–1.7)
TSH SERPL DL<=0.005 MIU/L-ACNC: 0.4 MIU/ML (ref 0.27–4.2)

## 2018-12-24 RX ORDER — ONDANSETRON 4 MG/1
4 TABLET, FILM COATED ORAL EVERY 8 HOURS PRN
Qty: 30 TABLET | Refills: 0 | Status: SHIPPED | OUTPATIENT
Start: 2018-12-24 | End: 2019-05-08

## 2018-12-28 ENCOUNTER — OFFICE VISIT (OUTPATIENT)
Dept: ENDOCRINOLOGY | Age: 63
End: 2018-12-28

## 2018-12-28 VITALS
HEIGHT: 64 IN | BODY MASS INDEX: 37.52 KG/M2 | DIASTOLIC BLOOD PRESSURE: 70 MMHG | SYSTOLIC BLOOD PRESSURE: 118 MMHG | HEART RATE: 76 BPM | WEIGHT: 219.8 LBS

## 2018-12-28 DIAGNOSIS — E03.9 HYPOTHYROIDISM (ACQUIRED): Primary | ICD-10-CM

## 2018-12-28 DIAGNOSIS — R63.5 ABNORMAL WEIGHT GAIN: ICD-10-CM

## 2018-12-28 DIAGNOSIS — E04.2 MULTINODULAR GOITER: Chronic | ICD-10-CM

## 2018-12-28 DIAGNOSIS — M81.0 POSTMENOPAUSAL OSTEOPOROSIS: ICD-10-CM

## 2018-12-28 PROCEDURE — 99214 OFFICE O/P EST MOD 30 MIN: CPT | Performed by: INTERNAL MEDICINE

## 2018-12-28 RX ORDER — INFLUENZA A VIRUS A/MICHIGAN/45/2015 X-275 (H1N1) ANTIGEN (FORMALDEHYDE INACTIVATED), INFLUENZA A VIRUS A/SINGAPORE/INFIMH-16-0019/2016 IVR-186 (H3N2) ANTIGEN (FORMALDEHYDE INACTIVATED), INFLUENZA B VIRUS B/PHUKET/3073/2013 ANTIGEN (FORMALDEHYDE INACTIVATED), AND INFLUENZA B VIRUS B/MARYLAND/15/2016 BX-69A ANTIGEN (FORMALDEHYDE INACTIVATED) 15; 15; 15; 15 UG/.5ML; UG/.5ML; UG/.5ML; UG/.5ML
INJECTION, SUSPENSION INTRAMUSCULAR
Refills: 0 | COMMUNITY
Start: 2018-10-16 | End: 2019-02-25

## 2018-12-28 RX ORDER — MONTELUKAST SODIUM 10 MG/1
TABLET ORAL
Qty: 30 TABLET | Refills: 2 | Status: SHIPPED | OUTPATIENT
Start: 2018-12-28 | End: 2019-03-27 | Stop reason: SDUPTHER

## 2019-01-02 DIAGNOSIS — G43.819 OTHER MIGRAINE WITHOUT STATUS MIGRAINOSUS, INTRACTABLE: ICD-10-CM

## 2019-01-02 DIAGNOSIS — Z00.00 HEALTHCARE MAINTENANCE: ICD-10-CM

## 2019-01-02 RX ORDER — FOLIC ACID 1 MG/1
TABLET ORAL
Qty: 30 TABLET | Refills: 1 | Status: SHIPPED | OUTPATIENT
Start: 2019-01-02 | End: 2019-02-25 | Stop reason: SDUPTHER

## 2019-01-02 RX ORDER — BUTALBITAL, ACETAMINOPHEN AND CAFFEINE 50; 325; 40 MG/1; MG/1; MG/1
TABLET ORAL
Qty: 60 TABLET | Refills: 0 | Status: SHIPPED | OUTPATIENT
Start: 2019-01-02 | End: 2019-01-28 | Stop reason: SDUPTHER

## 2019-01-04 ENCOUNTER — APPOINTMENT (OUTPATIENT)
Dept: LAB | Facility: HOSPITAL | Age: 64
End: 2019-01-04

## 2019-01-04 ENCOUNTER — OFFICE VISIT (OUTPATIENT)
Dept: ONCOLOGY | Facility: CLINIC | Age: 64
End: 2019-01-04

## 2019-01-04 VITALS
BODY MASS INDEX: 37.69 KG/M2 | DIASTOLIC BLOOD PRESSURE: 69 MMHG | OXYGEN SATURATION: 96 % | RESPIRATION RATE: 16 BRPM | WEIGHT: 220.8 LBS | HEIGHT: 64 IN | HEART RATE: 81 BPM | TEMPERATURE: 97.9 F | SYSTOLIC BLOOD PRESSURE: 115 MMHG

## 2019-01-04 DIAGNOSIS — R76.8 ANA POSITIVE: Primary | ICD-10-CM

## 2019-01-04 DIAGNOSIS — M81.0 POSTMENOPAUSAL OSTEOPOROSIS: ICD-10-CM

## 2019-01-04 DIAGNOSIS — L93.2 CUTANEOUS LUPUS ERYTHEMATOSUS: ICD-10-CM

## 2019-01-04 LAB
BASOPHILS # BLD AUTO: 0.03 10*3/MM3 (ref 0–0.1)
BASOPHILS NFR BLD AUTO: 0.6 % (ref 0–1.1)
DEPRECATED RDW RBC AUTO: 41.1 FL (ref 37–49)
EOSINOPHIL # BLD AUTO: 0.05 10*3/MM3 (ref 0–0.36)
EOSINOPHIL NFR BLD AUTO: 1 % (ref 1–5)
ERYTHROCYTE [DISTWIDTH] IN BLOOD BY AUTOMATED COUNT: 12.1 % (ref 11.7–14.5)
HCT VFR BLD AUTO: 40.3 % (ref 34–45)
HGB BLD-MCNC: 14.3 G/DL (ref 11.5–14.9)
IMM GRANULOCYTES # BLD AUTO: 0.02 10*3/MM3 (ref 0–0.03)
IMM GRANULOCYTES NFR BLD AUTO: 0.4 % (ref 0–0.5)
LYMPHOCYTES # BLD AUTO: 1.72 10*3/MM3 (ref 1–3.5)
LYMPHOCYTES NFR BLD AUTO: 34.1 % (ref 20–49)
MCH RBC QN AUTO: 32.7 PG (ref 27–33)
MCHC RBC AUTO-ENTMCNC: 35.5 G/DL (ref 32–35)
MCV RBC AUTO: 92.2 FL (ref 83–97)
MONOCYTES # BLD AUTO: 0.52 10*3/MM3 (ref 0.25–0.8)
MONOCYTES NFR BLD AUTO: 10.3 % (ref 4–12)
NEUTROPHILS # BLD AUTO: 2.71 10*3/MM3 (ref 1.5–7)
NEUTROPHILS NFR BLD AUTO: 53.6 % (ref 39–75)
NRBC BLD AUTO-RTO: 0 /100 WBC (ref 0–0)
PLATELET # BLD AUTO: 222 10*3/MM3 (ref 150–375)
PMV BLD AUTO: 9.2 FL (ref 8.9–12.1)
RBC # BLD AUTO: 4.37 10*6/MM3 (ref 3.9–5)
WBC NRBC COR # BLD: 5.05 10*3/MM3 (ref 4–10)

## 2019-01-04 PROCEDURE — 99213 OFFICE O/P EST LOW 20 MIN: CPT | Performed by: INTERNAL MEDICINE

## 2019-01-04 PROCEDURE — 85025 COMPLETE CBC W/AUTO DIFF WBC: CPT | Performed by: INTERNAL MEDICINE

## 2019-01-04 PROCEDURE — 36416 COLLJ CAPILLARY BLOOD SPEC: CPT | Performed by: INTERNAL MEDICINE

## 2019-01-04 NOTE — PROGRESS NOTES
Subjective     REASON FOR CONSULTATION:  1. Positive IgM anticardiolipin antibodies 3/19 AND 6/18  2.  Cutaneous. lupus and positive SIRI   3.  DVT and PE after neck surgery in 1998  4.  Recurrent UTI on vaginal estrogen therapy   5. ?  CVID on no therapy                        REQUESTING PHYSICIAN:  Kelly Cabrera M.D.    History of Present Illness patient is a 62-year-old lady with a positive IgM anticardiolipin antibody persistent from March through June comes in today to for reevaluation.  She is doing well overall and went to to [place] for 10 days taking Arixtra prophylaxis going and coming with no problems    She had a repeat anticardiolipin and it is barely elevated at 15 which is not significant at this point she does not need anticoagulation and just needs.  Prophylaxis for any invasive procedures or immobilization    She is having a lot of problems with pseudogout in her left knee and has no good solution for this.  Rheumatologist does not have any good solutionDiagnosis Date   • Anxiety    • Anxiety and depression    • Aortic sclerosis     followed by Dr. Smith, Vascular Surgery   • Asthma     mild intermittent   • Clotting disorder (CMS/HCC)    • COPD (chronic obstructive pulmonary disease) (CMS/HCC)    • CVID (common variable immunodeficiency) (CMS/HCC)     started to have a bad reaction to IVIG after several years   • Deep vein thrombosis (CMS/HCC)     BEHIND LEFT KNEE, followed by Hematology Ramiro Benavides    • Depression    • DJD (degenerative joint disease), lumbar    • Elevated alkaline phosphatase level     chronic, due to CVID   • Fatty liver 2006 ?   • Fibromyalgia    • History of pneumonia 1999   • Hyperlipidemia    • Hypothyroidism     followed by Endocrinology, Dr. Kuhn   • Lupus     cutaneous, followed by Rheumatology, Dr. Kelly Cabrera   • Migraines    • Multinodular goiter    • MVP (mitral valve prolapse)     followed by Cardiology, Dr. Bassett   • Osteoarthritis     followed by [facility], Dr. Hugo Costa   • Osteoporosis    • PONV (postoperative nausea and vomiting)    • Prediabetes    • Pulmonary embolism (CMS/HCC) 1998    x 4 after neck surgery, took coumadin x 10 yr but did not tolerate it   • Recurrent UTI    • Sciatica    • Sleep apnea     CPAP Machine #11   • Tobacco abuse         Past Surgical History:   Procedure Laterality Date   • ABDOMINAL SURGERY  1993    Endometriosis   • CARDIAC CATHETERIZATION     • CHOLECYSTECTOMY     • COLONOSCOPY      Per pt unable to complete due to severely tortuous colon   • EPIDURAL BLOCK     • HYSTERECTOMY  1993    complete at age 38, endometriosis   • KNEE ARTHROSCOPY      calcium scraping   • NECK SURGERY  1998    C 5 AND 6 TITANIUM PLATE   • PARTIAL HYSTERECTOMY     • SKIN BIOPSY      benign      HEME HISTORY;  patient is a 62-year-old female with a 1 year history of a generalized arthralgia presenting initially with pseudogout in the left knee which was debilitating and for 3 months she was wheelchair-bound because of pain and immobility.  Symptoms quickly improved but then she has had arthralgias in her fingers and a lot of myalgias and was referred to Dr. Cabrera for evaluation because when she was at the [facility] being evaluated for recurrent UTIs 1 year ago she was found to have positive SIRI and RNP antibodies suspicious for underlying rheumatological illness.  She's been on plaquenil now for about a month for an unclassifiable rheumatological illness but also has a history of cutaneous lupus diagnosed 15 years ago    The patient has a complicated past history including diagnosis of common variable immunodeficiency in 2005.  She took IV IgG for a year but could not tolerate it and stopped in 1998 she had C-spine surgery with a plate in her neck and postoperatively developed DVT and PE and is better on Coumadin which she stayed on for 7 years and then discontinued in 2005 when her INR was extremely long and the risk of bleeding  was felt to outweigh the risk of clotting.  She does not remember being told she had any hypercoagulable state and she saw Dr. Rodgers at that time for hematology.  She has never had a recurrence of her DVT since 2005 and was started on low-dose vaginal estrogen in the [facility] for her recurrent UTIs and she uses a 3 times a week and has had no problems.  She has no family history of DVTs and has a sister with breast cancer at age 57.  5/18  patient is a 63-year-old lady with cutaneous lupus and a positive anticardiolipin antibody referred to us for evaluation who returns today after blood work was done at her last visit to look at the results.  Her anti-cardio lipid antibody is still persistently positive with a titer IgM of 56  and negative IgG her titer in March was 34.  The rest of her hypercoagulable workup including protein Cand S and antithrombin III and lupus anticoagulant were negative -factor V Leiden and prothrombin gene mutation were negative.  Anti-phosphatidylserine and IgM was elevated at 33 with a normal IgG.  Quantitative immunoglobulins were normal except for mild lowering of IgG of 633 despite being on no treatment for her common variable immunodeficiency and I'm not sure this is a true diagnosis.  Immunofixation showed no paraprotein    She has backed off on vaginal estrogen use which is probably safest for her but she is having some more hot flashes since backing off on the vaginal estrogens likely because of some systemic absorption of the estrogen.At this point there is no indication to anticoagulate preventatively but I did warn her that if she has another DVT we will have to keep her on lifelong anticoagulation with Coumadin.    She and her  are planning a trip to [place] in September and I recommended to Arixtra 2.5 mg subcutaneous for each leg of the flight and we will get this authorized through her insurance.        Current Outpatient Medications on File Prior to Visit    Medication Sig Dispense Refill   • acebutolol (SECTRAL) 200 MG capsule Take 1 capsule by mouth Daily. 30 capsule 11   • ALPRAZolam (XANAX) 1 MG tablet take 1 tablet by mouth twice a day if needed for anxiety 60 tablet 0   • aspirin 81 MG chewable tablet Chew 81 mg Daily.     • atorvastatin (LIPITOR) 20 MG tablet Take 1 tablet by mouth Every Night. 30 tablet 11   • butalbital-acetaminophen-caffeine (FIORICET, ESGIC) -40 MG per tablet take 1 tablet by mouth every 6 hours if needed for headache or MIGRAINES 60 tablet 0   • cetirizine (ZYRTEC ALLERGY) 10 MG tablet Take 10 mg by mouth daily.     • Cholecalciferol (VITAMIN D3) 2000 UNITS tablet Take 2,000 Units by mouth Daily.     • Docusate Sodium 100 MG capsule Take 200 mg by mouth Daily. 60 tablet 3   • ESTRACE VAGINAL 0.1 MG/GM vaginal cream      • FLUoxetine (PROzac) 60 MG tablet Take 1 tablet by mouth Daily. 30 tablet 11   • fluticasone (FLONASE) 50 MCG/ACT nasal spray 2 sprays into each nostril Daily. 1 bottle 2   • fluticasone-salmeterol (ADVAIR DISKUS) 500-50 MCG/DOSE DISKUS Inhale 1 puff 2 (Two) Times a Day. 60 each 11   • FLUZONE QUADRIVALENT 0.5 ML suspension prefilled syringe injection inject 0.5 milliliter intramuscularly  0   • folic acid (FOLVITE) 1 MG tablet take 1 tablet by mouth once daily 30 tablet 1   • hydroxychloroquine (PLAQUENIL) 200 MG tablet      • ipratropium-albuterol (DUO-NEB) 0.5-2.5 mg/3 ml nebulizer Take 3 mL by nebulization Every 4 (Four) Hours As Needed for Wheezing. 360 mL 1   • levothyroxine (SYNTHROID, LEVOTHROID) 125 MCG tablet Take 1/2 tablet by mouth every morning on an empty stomach 30 tablet 2   • montelukast (SINGULAIR) 10 MG tablet take 1 tablet by mouth once daily 30 tablet 2   • Multiple Vitamins-Minerals (STRESS TAB NF PO) Take  by mouth. vitamin     • Omega-3 Fatty Acids (FISH OIL PO) Take  by mouth Daily.     • ondansetron (ZOFRAN) 4 MG tablet Take 1 tablet by mouth Every 8 (Eight) Hours As Needed for Nausea or  Vomiting. 30 tablet 0   • PROAIR  (90 Base) MCG/ACT inhaler INHALE 2 PUFFS BY MOUTH 4 TIMES A DAY IF NEEDED 8.5 g 1     Current Facility-Administered Medications on File Prior to Visit   Medication Dose Route Frequency Provider Last Rate Last Dose   • dexamethasone (DECADRON) injection 10 mg  10 mg Intramuscular Once Latanya Hickman MD       • promethazine (PHENERGAN) injection 25 mg  25 mg Intramuscular Once Latanya Hickman MD            ALLERGIES:    Allergies   Allergen Reactions   • Codeine Nausea And Vomiting        Social History     Socioeconomic History   • Marital status:      Spouse name: Not on file   • Number of children: Not on file   • Years of education: High school   • Highest education level: Not on file   Occupational History   • Occupation: Unemployed currently     Employer: RETIRED   Tobacco Use   • Smoking status: Current Every Day Smoker     Packs/day: 1.00     Years: 30.00     Pack years: 30.00     Types: Cigarettes   • Smokeless tobacco: Never Used   Substance and Sexual Activity   • Alcohol use: No   • Drug use: No   • Sexual activity: Yes     Partners: Male     Birth control/protection: Post-menopausal, Other     Comment: Hysterectomy        Family History   Problem Relation Age of Onset   • Thyroid disease Mother         Hypothyroid   • ALS Mother    • Anxiety disorder Mother    • Aortic aneurysm Father    • Heart disease Father    • Breast cancer Sister 50   • Cerebral aneurysm Sister    • Depression Sister    • Hypertension Sister    • Diabetes Sister    • Thyroid cancer Cousin    • Cerebral aneurysm Sister    • Depression Sister    • Hypertension Sister    • Diabetes Sister    • Arthritis Brother    • Hypertension Brother    • Colon polyps Brother         fine   • Cancer Sister         Breast   • Depression Sister    • Depression Sister    • Thyroid disease Sister         Hyperthyroid   • Depression Sister    • Psychosis Maternal Grandfather  "   • Depression Daughter    • Malig Hyperthermia Neg Hx         Review of Systems   Constitutional: Negative for chills, fatigue, fever and unexpected weight change (Weight gain).   HENT: Negative for mouth sores and sore throat.    Respiratory: Positive for shortness of breath (Due to asthma same 01/04/19). Negative for chest tightness.    Cardiovascular: Negative for chest pain.   Gastrointestinal: Negative for constipation, diarrhea, nausea and vomiting.   Genitourinary: Negative for difficulty urinating.   Musculoskeletal: Positive for arthralgias (right thumb 01/04/19), gait problem (both knees 1/04/19) and joint swelling (below the knee cap same 01/04/19). Negative for back pain.   Neurological: Positive for dizziness (better 01/04/19) and headaches (off and on 01/04/19).   Psychiatric/Behavioral: The patient is nervous/anxious (01/04/19 ).         Objective     Vitals:    01/04/19 1410   Height: 161.5 cm (63.58\")     Current Status 1/4/2019   ECOG score 0       Physical Exam    GENERAL:  Well-developed, well-nourished in no acute distress.   SKIN:  Warm, dry without rashes, purpura or petechiae.  EYES:  Pupils equal, round and reactive to light.  EOMs intact.  Conjunctivae normal.  EARS:  Hearing intact.  NOSE:  Septum midline.  No excoriations or nasal discharge.  MOUTH:  Tongue is well-papillated; no stomatitis or ulcers.  Lips normal.  THROAT:  Oropharynx without lesions or exudates.  NECK:  Supple with good range of motion; no thyromegaly or masses, no JVD.  LYMPHATICS:  No cervical, supraclavicular, axillary or inguinal adenopathy.  CHEST:  Lungs clear to auscultation. Good airflow.  CARDIAC:  Regular rate and rhythm without murmurs, rubs or gallops. Normal S1,S2.  ABDOMEN:  Soft, nontender with no hepatosplenomegaly or masses.  EXTREMITIES:  No clubbing, cyanosis or edema.Some ulnar deviation of her fingers on the left hand and thumb synovitis in her wrists  NEUROLOGICAL:  Cranial Nerves II-XII grossly " intact.  No focal neurological deficits.  PSYCHIATRIC:  Normal affect and mood.        RECENT LABS:  Hematology WBC   Date Value Ref Range Status   11/20/2018 4.45 4.00 - 10.00 10*3/mm3 Final     RBC   Date Value Ref Range Status   11/20/2018 4.11 3.90 - 5.00 10*6/mm3 Final     Hemoglobin   Date Value Ref Range Status   11/20/2018 13.4 11.5 - 14.9 g/dL Final     Hematocrit   Date Value Ref Range Status   11/20/2018 39.8 34.0 - 45.0 % Final     Platelets   Date Value Ref Range Status   11/20/2018 177 150 - 375 10*3/mm3 Final        CT of the abdomen and pelvis dated 12/16 shows no hepatosplenomegaly or kidney masses    IgM anticardiolipin antibody was elevated at 30-repeat testing in 5 /18 was 56   Lupus anticoagulant was negative and anti-beta 2 glycoprotein antibodies negative    DEXA BONE DENSITY AXIAL    FINDINGS: Average lumbar bone mineral density 0.69 g/sq cm correlating  to a T score of -3.3 and a Z score of -1.6. Right femoral neck bone  mineral density 0.53 g/sq cm correlating to a T value of -2.8 and a Z  value of -1.4. Left femoral neck bone mineral density 0.43 g/sq cm  correlating to a T value of -3.8 and a Z score of -2.4.     CONCLUSION: Osteoporosis, high risk for fracture.    This report was finalized on 11/23/2018  Assessment/Plan   1.  History of provoked DVT and pulmonary embolism in 1998 treated with 7 years of  coumadin  2.  History of cutaneous lupus with positive SIRI and RNP.Anticard AB  and arthralgias?  Atypical SLE  3.  Positive IgM anticardiolipin antibody intermediate-persistent-resolved as of 8/18  4.  Family history of breast cancer  5.  History of CVID with fairly normal immunoglobulin levels  6.  Osteoporosis-recommended treatment to her family doctor.  She has been on Forteo in the past which caused bone pains  7.  Pseudogout         Plan  1.   Minimize estrogen use   No follow-up needed at this time

## 2019-01-19 PROBLEM — M81.0 OSTEOPOROSIS: Status: RESOLVED | Noted: 2018-10-09 | Resolved: 2019-01-19

## 2019-01-23 DIAGNOSIS — F32.A ANXIETY AND DEPRESSION: ICD-10-CM

## 2019-01-23 DIAGNOSIS — F41.9 ANXIETY AND DEPRESSION: ICD-10-CM

## 2019-01-23 RX ORDER — ALPRAZOLAM 1 MG/1
TABLET ORAL
Qty: 60 TABLET | Refills: 0 | Status: SHIPPED | OUTPATIENT
Start: 2019-01-23 | End: 2019-03-01 | Stop reason: SDUPTHER

## 2019-01-28 DIAGNOSIS — G43.819 OTHER MIGRAINE WITHOUT STATUS MIGRAINOSUS, INTRACTABLE: ICD-10-CM

## 2019-01-28 RX ORDER — BUTALBITAL, ACETAMINOPHEN AND CAFFEINE 50; 325; 40 MG/1; MG/1; MG/1
TABLET ORAL
Qty: 60 TABLET | Refills: 0 | Status: CANCELLED | OUTPATIENT
Start: 2019-01-28

## 2019-01-28 RX ORDER — BUTALBITAL, ACETAMINOPHEN AND CAFFEINE 50; 325; 40 MG/1; MG/1; MG/1
1 TABLET ORAL EVERY 6 HOURS PRN
Qty: 60 TABLET | Refills: 0 | Status: SHIPPED | OUTPATIENT
Start: 2019-01-28 | End: 2019-02-25 | Stop reason: SDUPTHER

## 2019-02-13 ENCOUNTER — TELEPHONE (OUTPATIENT)
Dept: ONCOLOGY | Facility: HOSPITAL | Age: 64
End: 2019-02-13

## 2019-02-15 ENCOUNTER — TELEPHONE (OUTPATIENT)
Dept: ONCOLOGY | Facility: HOSPITAL | Age: 64
End: 2019-02-15

## 2019-02-15 NOTE — TELEPHONE ENCOUNTER
----- Message from Yasmine Blevins RN sent at 2/15/2019  7:47 AM EST -----      ----- Message -----  From: Luz Calero  Sent: 2/14/2019   4:28 PM  To: [facility]    [phone]    Dr. Domínguez had told her if she ever needed to have surgery she would need a shot of Lovenox.  She needs to have some eye surgery done so she needs to talk to someone and also has questions about when she flies.

## 2019-02-15 NOTE — TELEPHONE ENCOUNTER
Pt is have in small spot (her words, Bubble) removed from her eyelid on 3/13. Pt would like to know if she needs to take Lovenox. She also wants to know how long of a airplane flight justifies taking Lovenox. Reviewed all with Dr. Domínguez. Per DR. Domínguez pt does not need Lovenox for her upcoming surgery and she will need to use Lovenox for flights over 4 hours. Pt V/U and had no further questions.

## 2019-02-25 ENCOUNTER — OFFICE VISIT (OUTPATIENT)
Dept: FAMILY MEDICINE CLINIC | Facility: CLINIC | Age: 64
End: 2019-02-25

## 2019-02-25 VITALS
RESPIRATION RATE: 16 BRPM | HEIGHT: 64 IN | SYSTOLIC BLOOD PRESSURE: 128 MMHG | HEART RATE: 76 BPM | DIASTOLIC BLOOD PRESSURE: 72 MMHG | OXYGEN SATURATION: 99 % | WEIGHT: 221 LBS | BODY MASS INDEX: 37.73 KG/M2

## 2019-02-25 DIAGNOSIS — J02.9 PHARYNGITIS, UNSPECIFIED ETIOLOGY: Primary | ICD-10-CM

## 2019-02-25 DIAGNOSIS — Z00.00 HEALTHCARE MAINTENANCE: ICD-10-CM

## 2019-02-25 DIAGNOSIS — G43.819 OTHER MIGRAINE WITHOUT STATUS MIGRAINOSUS, INTRACTABLE: ICD-10-CM

## 2019-02-25 DIAGNOSIS — E78.5 HYPERLIPIDEMIA, UNSPECIFIED HYPERLIPIDEMIA TYPE: ICD-10-CM

## 2019-02-25 LAB
EXPIRATION DATE: NORMAL
INTERNAL CONTROL: NORMAL
Lab: NORMAL
S PYO AG THROAT QL: NEGATIVE

## 2019-02-25 PROCEDURE — 87880 STREP A ASSAY W/OPTIC: CPT | Performed by: FAMILY MEDICINE

## 2019-02-25 PROCEDURE — 99212 OFFICE O/P EST SF 10 MIN: CPT | Performed by: FAMILY MEDICINE

## 2019-02-25 RX ORDER — FOLIC ACID 1 MG/1
TABLET ORAL
Qty: 30 TABLET | Refills: 2 | Status: SHIPPED | OUTPATIENT
Start: 2019-02-25 | End: 2019-05-30 | Stop reason: SDUPTHER

## 2019-02-25 RX ORDER — ATORVASTATIN CALCIUM 20 MG/1
TABLET, FILM COATED ORAL
Qty: 30 TABLET | Refills: 3 | Status: SHIPPED | OUTPATIENT
Start: 2019-02-25 | End: 2019-06-26 | Stop reason: SDUPTHER

## 2019-02-25 RX ORDER — BUTALBITAL, ACETAMINOPHEN AND CAFFEINE 50; 325; 40 MG/1; MG/1; MG/1
TABLET ORAL
Qty: 60 TABLET | Refills: 0 | Status: SHIPPED | OUTPATIENT
Start: 2019-02-25 | End: 2019-03-22 | Stop reason: SDUPTHER

## 2019-02-25 NOTE — PROGRESS NOTES
Problem List Items Addressed This Visit     None      Visit Diagnoses     Pharyngitis, unspecified etiology    -  Primary    Relevant Orders    POC Rapid Strep A (Completed)         Return if symptoms worsen or fail to improve.  Patient Instructions   Eucerin.     Symptomatic treatment.     Kaci Mcgee is a 63 y.o. female being seen in our office today for Rash                 She  reports that she has been smoking cigarettes.  She has a 30.00 pack-year smoking history. she has never used smokeless tobacco. She reports that she does not drink alcohol or use drugs.             HPI She started with some sweating sensation and burning on Friday night on her neck and Saturday during the day. No new medicine but her grandson has a rash as well. (, who she is here with, showed me the rash most c/w roseola) She first noticed it on her neck but now is a little more generalized on her shins and maybe some on her thorax. Not itchy, but burning.  She has had some low grade fever, sore throat, some drainage and cough. No new medications. Her grandson had a little rash and a low grade fever.     Was told a long time ago that she had skin lupus but no real symptoms with that. Her  had a similar rash a couple of weeks ago.              The following portions of the patient's history were reviewed and updated as appropriate:PMHroutine: Social history , Allergies, Current Medications, Active Problem List and Health Maintenance            Review of Systems   Constitutional: Positive for chills, fatigue and fever. Negative for activity change, appetite change and unexpected weight change.   HENT: Negative for congestion, ear pain, hearing loss, nosebleeds, rhinorrhea and sore throat.    Eyes: Negative for pain, redness and visual disturbance.   Respiratory: Positive for cough. Negative for shortness of breath and wheezing.    Cardiovascular: Positive for leg swelling. Negative for chest pain and palpitations.    Gastrointestinal: Negative for abdominal pain, blood in stool, constipation, diarrhea, nausea and vomiting.   Endocrine: Negative for cold intolerance and heat intolerance.   Genitourinary: Negative for difficulty urinating, dysuria, frequency, hematuria, pelvic pain, urgency and vaginal discharge.   Musculoskeletal: Negative for arthralgias, back pain and joint swelling.   Skin: Positive for rash. Negative for wound.   Neurological: Positive for headaches. Negative for dizziness, weakness and numbness.   Hematological: Does not bruise/bleed easily.   Psychiatric/Behavioral: Negative for dysphoric mood, sleep disturbance and suicidal ideas. The patient is not nervous/anxious.                 BP Readings from Last 1 Encounters:   02/25/19 128/72     Wt Readings from Last 3 Encounters:   02/25/19 100 kg (221 lb)   01/04/19 100 kg (220 lb 12.8 oz)   12/28/18 99.7 kg (219 lb 12.8 oz)   Body mass index is 37.93 kg/m².             Physical Exam   Constitutional: She appears well-developed and well-nourished.   HENT:   Head: Normocephalic and atraumatic.   Right Ear: Tympanic membrane, external ear and ear canal normal.   Left Ear: Tympanic membrane, external ear and ear canal normal.   Mouth/Throat: Oropharynx is clear and moist.   Eyes: Conjunctivae are normal. Right eye exhibits no discharge. Left eye exhibits no discharge.   Neck: Normal range of motion. Neck supple. No thyromegaly present.   Cardiovascular: Normal rate, regular rhythm and normal heart sounds.   Pulmonary/Chest: Effort normal and breath sounds normal. No respiratory distress. She has no wheezes. She has no rales.   Lymphadenopathy:     She has no cervical adenopathy.   Skin: Skin is warm and dry. Rash (most noticable on her ant neck a wheal and flare area at sternal notch 7 x 5 cm in size. Very mild diffuse rash on shin, no appreciable rash on chest or abd) noted.   Psychiatric: She has a normal mood and affect. Judgment normal.   Vitals  reviewed.        Ref Range & Units     Rapid Strep A Screen  Negative

## 2019-02-26 RX ORDER — BUTALBITAL, ACETAMINOPHEN AND CAFFEINE 50; 325; 40 MG/1; MG/1; MG/1
1 TABLET ORAL EVERY 6 HOURS PRN
Qty: 60 TABLET | Refills: 0 | OUTPATIENT
Start: 2019-02-26

## 2019-03-01 DIAGNOSIS — F32.A ANXIETY AND DEPRESSION: ICD-10-CM

## 2019-03-01 DIAGNOSIS — F41.9 ANXIETY AND DEPRESSION: ICD-10-CM

## 2019-03-01 DIAGNOSIS — R51.9 CHRONIC DAILY HEADACHE: ICD-10-CM

## 2019-03-01 RX ORDER — ALPRAZOLAM 1 MG/1
TABLET ORAL
Qty: 60 TABLET | Refills: 0 | Status: SHIPPED | OUTPATIENT
Start: 2019-03-01 | End: 2019-04-02 | Stop reason: SDUPTHER

## 2019-03-01 RX ORDER — ACEBUTOLOL HYDROCHLORIDE 200 MG/1
CAPSULE ORAL
Qty: 30 CAPSULE | Refills: 6 | Status: SHIPPED | OUTPATIENT
Start: 2019-03-01 | End: 2019-08-23 | Stop reason: SDUPTHER

## 2019-03-22 DIAGNOSIS — G43.819 OTHER MIGRAINE WITHOUT STATUS MIGRAINOSUS, INTRACTABLE: ICD-10-CM

## 2019-03-22 RX ORDER — BUTALBITAL, ACETAMINOPHEN AND CAFFEINE 50; 325; 40 MG/1; MG/1; MG/1
TABLET ORAL
Qty: 60 TABLET | Refills: 0 | Status: SHIPPED | OUTPATIENT
Start: 2019-03-22 | End: 2019-04-18 | Stop reason: SDUPTHER

## 2019-03-27 RX ORDER — MONTELUKAST SODIUM 10 MG/1
TABLET ORAL
Qty: 30 TABLET | Refills: 2 | Status: SHIPPED | OUTPATIENT
Start: 2019-03-27 | End: 2019-06-26 | Stop reason: SDUPTHER

## 2019-04-02 DIAGNOSIS — F32.A ANXIETY AND DEPRESSION: ICD-10-CM

## 2019-04-02 DIAGNOSIS — F41.9 ANXIETY AND DEPRESSION: ICD-10-CM

## 2019-04-02 RX ORDER — ALPRAZOLAM 1 MG/1
TABLET ORAL
Qty: 60 TABLET | Refills: 0 | Status: SHIPPED | OUTPATIENT
Start: 2019-04-02 | End: 2019-05-02 | Stop reason: SDUPTHER

## 2019-04-09 ENCOUNTER — OFFICE VISIT (OUTPATIENT)
Dept: FAMILY MEDICINE CLINIC | Facility: CLINIC | Age: 64
End: 2019-04-09

## 2019-04-09 VITALS
BODY MASS INDEX: 37.92 KG/M2 | HEIGHT: 64 IN | HEART RATE: 64 BPM | OXYGEN SATURATION: 98 % | SYSTOLIC BLOOD PRESSURE: 128 MMHG | DIASTOLIC BLOOD PRESSURE: 84 MMHG

## 2019-04-09 DIAGNOSIS — E03.9 HYPOTHYROIDISM, UNSPECIFIED TYPE: Primary | ICD-10-CM

## 2019-04-09 DIAGNOSIS — L93.0 LUPUS ERYTHEMATOSUS, UNSPECIFIED FORM: ICD-10-CM

## 2019-04-09 DIAGNOSIS — F41.9 ANXIETY AND DEPRESSION: ICD-10-CM

## 2019-04-09 DIAGNOSIS — F32.A ANXIETY AND DEPRESSION: ICD-10-CM

## 2019-04-09 DIAGNOSIS — K62.5 BRBPR (BRIGHT RED BLOOD PER RECTUM): ICD-10-CM

## 2019-04-09 DIAGNOSIS — J45.20 MILD INTERMITTENT ASTHMA WITHOUT COMPLICATION: ICD-10-CM

## 2019-04-09 DIAGNOSIS — R74.8 ELEVATED ALKALINE PHOSPHATASE LEVEL: ICD-10-CM

## 2019-04-09 DIAGNOSIS — R21 RASH: ICD-10-CM

## 2019-04-09 LAB
25(OH)D3+25(OH)D2 SERPL-MCNC: 38.9 NG/ML (ref 30–100)
ALBUMIN SERPL-MCNC: 5 G/DL (ref 3.5–5.2)
ALBUMIN/GLOB SERPL: 2.5 G/DL
ALP SERPL-CCNC: 196 U/L (ref 39–117)
ALT SERPL-CCNC: 30 U/L (ref 1–33)
AST SERPL-CCNC: 21 U/L (ref 1–32)
BILIRUB SERPL-MCNC: 0.2 MG/DL (ref 0.2–1.2)
BUN SERPL-MCNC: 10 MG/DL (ref 8–23)
BUN/CREAT SERPL: 12.7 (ref 7–25)
CALCIUM SERPL-MCNC: 9.8 MG/DL (ref 8.6–10.5)
CHLORIDE SERPL-SCNC: 101 MMOL/L (ref 98–107)
CO2 SERPL-SCNC: 26.4 MMOL/L (ref 22–29)
CREAT SERPL-MCNC: 0.79 MG/DL (ref 0.57–1)
ERYTHROCYTE [DISTWIDTH] IN BLOOD BY AUTOMATED COUNT: 12.6 % (ref 12.3–15.4)
EXPIRATION DATE: NORMAL
GGT SERPL-CCNC: 64 U/L (ref 5–36)
GLOBULIN SER CALC-MCNC: 2 GM/DL
GLUCOSE SERPL-MCNC: 98 MG/DL (ref 65–99)
HCT VFR BLD AUTO: 41.4 % (ref 34–46.6)
HGB BLD-MCNC: 13.8 G/DL (ref 12–15.9)
INTERNAL CONTROL: NORMAL
Lab: NORMAL
MCH RBC QN AUTO: 32.8 PG (ref 26.6–33)
MCHC RBC AUTO-ENTMCNC: 33.3 G/DL (ref 31.5–35.7)
MCV RBC AUTO: 98.3 FL (ref 79–97)
PLATELET # BLD AUTO: 232 10*3/MM3 (ref 140–450)
POTASSIUM SERPL-SCNC: 4.2 MMOL/L (ref 3.5–5.2)
PROT SERPL-MCNC: 7 G/DL (ref 6–8.5)
RBC # BLD AUTO: 4.21 10*6/MM3 (ref 3.77–5.28)
S PYO AG THROAT QL: NEGATIVE
SODIUM SERPL-SCNC: 139 MMOL/L (ref 136–145)
TSH SERPL DL<=0.005 MIU/L-ACNC: 0.32 MIU/ML (ref 0.27–4.2)
WBC # BLD AUTO: 4.17 10*3/MM3 (ref 3.4–10.8)

## 2019-04-09 RX ORDER — LEVALBUTEROL TARTRATE 45 UG/1
1-2 AEROSOL, METERED ORAL EVERY 4 HOURS PRN
Qty: 15 G | Refills: 11 | Status: SHIPPED | OUTPATIENT
Start: 2019-04-09 | End: 2019-08-23 | Stop reason: SDUPTHER

## 2019-04-11 LAB
ALP BONE CFR SERPL: 25 % (ref 14–68)
ALP INTEST CFR SERPL: 2 % (ref 0–18)
ALP LIVER CFR SERPL: 73 % (ref 18–85)

## 2019-04-18 DIAGNOSIS — G43.819 OTHER MIGRAINE WITHOUT STATUS MIGRAINOSUS, INTRACTABLE: ICD-10-CM

## 2019-04-18 RX ORDER — BUTALBITAL, ACETAMINOPHEN AND CAFFEINE 50; 325; 40 MG/1; MG/1; MG/1
TABLET ORAL
Qty: 60 TABLET | Refills: 0 | Status: SHIPPED | OUTPATIENT
Start: 2019-04-18 | End: 2019-05-14 | Stop reason: SDUPTHER

## 2019-04-24 ENCOUNTER — PREP FOR SURGERY (OUTPATIENT)
Dept: OTHER | Facility: HOSPITAL | Age: 64
End: 2019-04-24

## 2019-04-24 DIAGNOSIS — R19.5 HEME POSITIVE STOOL: Primary | ICD-10-CM

## 2019-05-02 DIAGNOSIS — F41.9 ANXIETY AND DEPRESSION: ICD-10-CM

## 2019-05-02 DIAGNOSIS — F32.A ANXIETY AND DEPRESSION: ICD-10-CM

## 2019-05-02 RX ORDER — ALPRAZOLAM 1 MG/1
TABLET ORAL
Qty: 60 TABLET | Refills: 0 | Status: SHIPPED | OUTPATIENT
Start: 2019-05-02 | End: 2019-06-11 | Stop reason: SDUPTHER

## 2019-05-07 DIAGNOSIS — E03.8 SUBCLINICAL HYPOTHYROIDISM: ICD-10-CM

## 2019-05-07 RX ORDER — LEVOTHYROXINE SODIUM 0.12 MG/1
TABLET ORAL
Qty: 90 TABLET | Refills: 1 | Status: SHIPPED | OUTPATIENT
Start: 2019-05-07 | End: 2019-08-23 | Stop reason: SDUPTHER

## 2019-05-08 ENCOUNTER — OFFICE VISIT (OUTPATIENT)
Dept: SURGERY | Facility: CLINIC | Age: 64
End: 2019-05-08

## 2019-05-08 VITALS — BODY MASS INDEX: 37.42 KG/M2 | HEIGHT: 64 IN | HEART RATE: 83 BPM | OXYGEN SATURATION: 98 % | WEIGHT: 219.2 LBS

## 2019-05-08 DIAGNOSIS — R19.5 HEME POSITIVE STOOL: Primary | ICD-10-CM

## 2019-05-08 PROCEDURE — 99214 OFFICE O/P EST MOD 30 MIN: CPT | Performed by: SURGERY

## 2019-05-08 NOTE — PROGRESS NOTES
SURGERY  Kaci Mcgee   1955  05/08/19    Chief Complaint: Hemoccult-positive stools    HPI    Patient is a very nice 63 y.o. female who happens to be my first cousin, previously referred elsewhere, but requesting to see me, who comes in with a disturbing story of a colonoscopy done about 20 years ago or at least attempted, aborted, thereafter rushing her to radiology to see if they had perforated her.  This was done at a different facility and she indicates that she was told by that endocrinologist that she should never ever have a colonoscopy again, due to a tight turn in the colon.  She recalls awakening during the procedure and them switching to a pediatric scope.  That she is somewhat concerned about my suggestion that we go ahead and proceed.    She does say that she was constipated during the timeframe in which she was taking the test for the Hemoccult.  Otherwise she does not have any constipation or diarrhea or GI symptoms of any sort.  She denies any upper GI symptoms.  She also says she has difficulty tolerating the prep.    She has a multitude of medical issues including sleep apnea, COPD still being a smoker, lupus (when she is not convinced that she has), positive IgM anti-cardio Turlock antibody now resolved, history of DVT and PE, and a stable multinodular goiter.    Past Medical History:   Diagnosis Date   • Anxiety and depression    • Aortic sclerosis     followed by Dr. Smith, Vascular Surgery   • Asthma     mild intermittent   • Clotting disorder (CMS/HCC)    • COPD (chronic obstructive pulmonary disease) (CMS/HCC)    • CVID (common variable immunodeficiency) (CMS/HCC)     started to have a bad reaction to IVIG after several years   • Deep vein thrombosis (CMS/HCC)     BEHIND LEFT KNEE, followed by Hematology Ramiro Benavides    • Depression    • DJD (degenerative joint disease), lumbar    • Elevated alkaline phosphatase level     chronic, due to CVID   • Fatty liver 2006 ?   • Fibromyalgia    •  History of pneumonia 1999   • Hyperlipidemia    • Hypothyroidism     followed by Endocrinology, Dr. Kuhn   • Lupus     cutaneous, followed by Rheumatology, Dr. Mendoza June   • Migraines    • Multinodular goiter    • MVP (mitral valve prolapse)     followed by Cardiology, Dr. Bassett   • Osteoarthritis     followed by [facility], Dr. Hugo Costa   • Osteoporosis    • PONV (postoperative nausea and vomiting)    • Prediabetes    • Pulmonary embolism (CMS/HCC) 1998    x 4 after neck surgery, took coumadin x 10 yr but did not tolerate it   • Recurrent UTI    • Sciatica    • Sleep apnea     CPAP Machine #11   • Tobacco abuse      Past Surgical History:   Procedure Laterality Date   • ABDOMINAL SURGERY  1993    Endometriosis   • CARDIAC CATHETERIZATION     • COLONOSCOPY      Per pt unable to complete due to severely tortuous colon   • EPIDURAL BLOCK     • EYE SURGERY     • KNEE ARTHROSCOPY  1992   • LAPAROSCOPIC CHOLECYSTECTOMY W/ CHOLANGIOGRAPHY N/A 02/16/2007    Dr. Giovana Mooney   • NECK SURGERY  1998    C 5 AND 6 TITANIUM PLATE   • SKIN BIOPSY      benign   • TOTAL ABDOMINAL HYSTERECTOMY N/A 1993     Family History   Problem Relation Age of Onset   • Thyroid disease Mother         Hypothyroid   • ALS Mother    • Anxiety disorder Mother    • Aortic aneurysm Father    • Heart disease Father    • Breast cancer Sister 50   • Cerebral aneurysm Sister    • Depression Sister    • Hypertension Sister    • Diabetes Sister    • Thyroid cancer Cousin    • Cerebral aneurysm Sister    • Depression Sister    • Hypertension Sister    • Diabetes Sister    • Arthritis Brother    • Hypertension Brother    • Colon polyps Brother         fine   • Cancer Sister         Breast   • Depression Sister    • Depression Sister    • Thyroid disease Sister         Hyperthyroid   • Depression Sister    • Psychosis Maternal Grandfather    • Depression Daughter    • Malig Hyperthermia Neg Hx      Social History      Socioeconomic History   • Marital status:      Spouse name: Not on file   • Number of children: Not on file   • Years of education: High school   • Highest education level: Not on file   Occupational History   • Occupation: Unemployed currently     Employer: RETIRED   Tobacco Use   • Smoking status: Current Every Day Smoker     Packs/day: 1.00     Years: 30.00     Pack years: 30.00     Types: Cigarettes   • Smokeless tobacco: Never Used   Substance and Sexual Activity   • Alcohol use: No   • Drug use: No   • Sexual activity: Yes     Partners: Male     Birth control/protection: Post-menopausal, Other     Comment: Hysterectomy         Current Outpatient Medications:   •  acebutolol (SECTRAL) 200 MG capsule, take 1 capsule by mouth once daily, Disp: 30 capsule, Rfl: 6  •  ALPRAZolam (XANAX) 1 MG tablet, take 1 tablet by mouth twice a day if needed for anxiety, Disp: 60 tablet, Rfl: 0  •  aspirin 81 MG tablet, Chew 81 mg Daily., Disp: , Rfl:   •  atorvastatin (LIPITOR) 20 MG tablet, take 1 tablet by mouth at bedtime, Disp: 30 tablet, Rfl: 3  •  butalbital-acetaminophen-caffeine (FIORICET, ESGIC) -40 MG per tablet, take 1 tablet by mouth every 6 hours if needed for headache, Disp: 60 tablet, Rfl: 0  •  cetirizine (ZYRTEC ALLERGY) 10 MG tablet, Take 10 mg by mouth daily., Disp: , Rfl:   •  Cholecalciferol (VITAMIN D3) 2000 UNITS tablet, Take 2,000 Units by mouth Daily., Disp: , Rfl:   •  ESTRACE VAGINAL 0.1 MG/GM vaginal cream, , Disp: , Rfl:   •  FLUoxetine (PROzac) 60 MG tablet, Take 1 tablet by mouth Daily., Disp: 30 tablet, Rfl: 11  •  fluticasone-salmeterol (ADVAIR DISKUS) 500-50 MCG/DOSE DISKUS, Inhale 1 puff 2 (Two) Times a Day., Disp: 60 each, Rfl: 11  •  folic acid (FOLVITE) 1 MG tablet, take 1 tablet by mouth once daily, Disp: 30 tablet, Rfl: 2  •  levalbuterol (XOPENEX HFA) 45 MCG/ACT inhaler, Inhale 1-2 puffs Every 4 (Four) Hours As Needed for Wheezing or Shortness of Air., Disp: 15 g, Rfl:  "11  •  levothyroxine (SYNTHROID, LEVOTHROID) 125 MCG tablet, take 1/2 tablet by mouth every morning ON AN EMPTY STOMACH, Disp: 90 tablet, Rfl: 1  •  montelukast (SINGULAIR) 10 MG tablet, take 1 tablet by mouth once daily, Disp: 30 tablet, Rfl: 2  •  Multiple Vitamins-Minerals (STRESS TAB NF PO), Take 1 tablet by mouth Daily. vitamin , Disp: , Rfl:   •  Omega-3 Fatty Acids (FISH OIL PO), Take 2 capsules by mouth Daily., Disp: , Rfl:     Allergies   Allergen Reactions   • Vimovo [Naproxen-Esomeprazole] Shortness Of Breath and Palpitations   • Codeine Nausea And Vomiting     Review of Systems   Constitutional: Positive for diaphoresis and unexpected weight gain.   Respiratory: Positive for apnea.    Gastrointestinal: Positive for blood in stool.   Musculoskeletal: Positive for arthralgias and joint swelling.   Allergic/Immunologic: Positive for environmental allergies and immunocompromised state.   Neurological: Positive for headache.   Psychiatric/Behavioral: The patient is nervous/anxious.    All other systems reviewed and are negative.      Vitals:    05/08/19 1321   Pulse: 83   SpO2: 98%   Weight: 99.4 kg (219 lb 3.2 oz)   Height: 162.6 cm (64\")       PHYSICAL EXAM:    Pulse 83   Ht 162.6 cm (64\")   Wt 99.4 kg (219 lb 3.2 oz)   LMP  (LMP Unknown)   SpO2 98%   BMI 37.63 kg/m²   Body mass index is 37.63 kg/m².    Constitutional: well developed, well nourished, appears somewhat unhealthy  Eyes: sclera nonicteric, conjunctiva not injected   ENMT: Hearing intact, trachea midline, thyroid without masses  CVS: RRR, no murmur, peripheral edema not present  Respiratory: CTA except some wheezes with expiration, normal respiratory effort   Gastrointestinal: no hepatosplenomegaly, abdomen nontender, abdominal hernia not detected  Genitourinary: inguinal hernia not detected  Musculoskeletal: gait somewhat slowed, muscle mass normal  Skin: warm and dry, no rashes visible, consistent with smoker  Neurological: awake and " alert, seems to have reasonable capacity for understanding for medical decision making  Psychiatric: appears to have reasonable judgement, pleasant  Lymphatics: no cervical adenopathy    Radiographic Findings: Osteoporosis high risk for fracture on DEXA scan,   Ultrasound of the thyroid 7/11/2018 stable multinodular goiter     Lab Findings: Alkaline phosphatase 196, GGT 64    IMPRESSION:  · Hemoccult + stool.  Asymptomatic  · Aunt with colon cancer.   · Absence of UGI symptoms.  · Elevated GGT with prior exhaustive work up for elevated LFT's  · COPD, continued smoker on Advair  · Sleep apnea  · Possible lupus  · Positive IgG anti-Cardiolipin antibody resolved  · History of DVT and PE  · Stable multinodular goiter  · Difficulty with prep  · Common variable immunodeficiency    PLAN:  · C scope.  We discussed the risks and benefits and she has chosen that over ACBE.  She had that following her aborted colonoscopy previously and does not want to have to go through that again  · Hold fish oil and ASA for one week.  · Bring your CPAP machine  · 2-day prep, with first day of one bottle mag citrate, low residue diet, second day with MiraLAX and liquid of her choice.  · Follow-up with her prior gastroenterologist since they have done an exhaustive work-up, and will likely indicate that she does not need any further work-up in the context of her other history of common variable immunodeficiency.    Giovana Mooney MD  05/08/19

## 2019-05-14 DIAGNOSIS — G43.819 OTHER MIGRAINE WITHOUT STATUS MIGRAINOSUS, INTRACTABLE: ICD-10-CM

## 2019-05-14 RX ORDER — BUTALBITAL, ACETAMINOPHEN AND CAFFEINE 50; 325; 40 MG/1; MG/1; MG/1
TABLET ORAL
Qty: 60 TABLET | Refills: 0 | Status: SHIPPED | OUTPATIENT
Start: 2019-05-14 | End: 2019-06-11 | Stop reason: SDUPTHER

## 2019-05-30 DIAGNOSIS — Z00.00 HEALTHCARE MAINTENANCE: ICD-10-CM

## 2019-05-30 RX ORDER — FOLIC ACID 1 MG/1
TABLET ORAL
Qty: 90 TABLET | Refills: 1 | Status: SHIPPED | OUTPATIENT
Start: 2019-05-30 | End: 2019-08-23 | Stop reason: SDUPTHER

## 2019-06-05 ENCOUNTER — HOSPITAL ENCOUNTER (OUTPATIENT)
Facility: HOSPITAL | Age: 64
Setting detail: HOSPITAL OUTPATIENT SURGERY
Discharge: HOME | End: 2019-06-05
Attending: SURGERY | Admitting: SURGERY

## 2019-06-05 ENCOUNTER — ANESTHESIA (OUTPATIENT)
Dept: GASTROENTEROLOGY | Facility: HOSPITAL | Age: 64
End: 2019-06-05

## 2019-06-05 ENCOUNTER — ANESTHESIA EVENT (OUTPATIENT)
Dept: GASTROENTEROLOGY | Facility: HOSPITAL | Age: 64
End: 2019-06-05

## 2019-06-05 VITALS
DIASTOLIC BLOOD PRESSURE: 54 MMHG | HEIGHT: 64 IN | SYSTOLIC BLOOD PRESSURE: 136 MMHG | BODY MASS INDEX: 36.61 KG/M2 | RESPIRATION RATE: 16 BRPM | TEMPERATURE: 97.5 F | WEIGHT: 214.44 LBS | HEART RATE: 70 BPM | OXYGEN SATURATION: 95 %

## 2019-06-05 DIAGNOSIS — R19.5 HEME POSITIVE STOOL: ICD-10-CM

## 2019-06-05 PROCEDURE — 25010000002 PROPOFOL 10 MG/ML EMULSION: Performed by: ANESTHESIOLOGY

## 2019-06-05 PROCEDURE — 25010000002 GLUCAGON (RDNA) PER 1 MG: Performed by: SURGERY

## 2019-06-05 PROCEDURE — 25010000002 PROPOFOL 1000 MG/ML EMULSION: Performed by: ANESTHESIOLOGY

## 2019-06-05 PROCEDURE — 45385 COLONOSCOPY W/LESION REMOVAL: CPT | Performed by: SURGERY

## 2019-06-05 PROCEDURE — 88305 TISSUE EXAM BY PATHOLOGIST: CPT | Performed by: SURGERY

## 2019-06-05 RX ORDER — PROPOFOL 10 MG/ML
VIAL (ML) INTRAVENOUS AS NEEDED
Status: DISCONTINUED | OUTPATIENT
Start: 2019-06-05 | End: 2019-06-05 | Stop reason: SURG

## 2019-06-05 RX ORDER — LIDOCAINE HYDROCHLORIDE 20 MG/ML
INJECTION, SOLUTION INFILTRATION; PERINEURAL AS NEEDED
Status: DISCONTINUED | OUTPATIENT
Start: 2019-06-05 | End: 2019-06-05 | Stop reason: SURG

## 2019-06-05 RX ORDER — SODIUM CHLORIDE, SODIUM LACTATE, POTASSIUM CHLORIDE, CALCIUM CHLORIDE 600; 310; 30; 20 MG/100ML; MG/100ML; MG/100ML; MG/100ML
30 INJECTION, SOLUTION INTRAVENOUS CONTINUOUS PRN
Status: DISCONTINUED | OUTPATIENT
Start: 2019-06-05 | End: 2019-06-05 | Stop reason: HOSPADM

## 2019-06-05 RX ADMIN — PROPOFOL 150 MG: 10 INJECTION, EMULSION INTRAVENOUS at 07:27

## 2019-06-05 RX ADMIN — SODIUM CHLORIDE, POTASSIUM CHLORIDE, SODIUM LACTATE AND CALCIUM CHLORIDE 30 ML/HR: 600; 310; 30; 20 INJECTION, SOLUTION INTRAVENOUS at 07:04

## 2019-06-05 RX ADMIN — LIDOCAINE HYDROCHLORIDE 60 MG: 20 INJECTION, SOLUTION INFILTRATION; PERINEURAL at 07:27

## 2019-06-05 RX ADMIN — PROPOFOL 140 MCG/KG/MIN: 10 INJECTION, EMULSION INTRAVENOUS at 07:27

## 2019-06-05 NOTE — OP NOTE
Colonoscopy Procedure Note  Kaci Mcgee  1955  Date of Procedure: 06/05/19    Pre-operative Diagnosis:    · Heme positive stools    Post-operative Diagnosis:  · Distal transverse colon polyp, 4 mm    Procedure: Colonoscopy with snare cautery polypectomy     Findings/Treatments:   · Distal transverse colon polyp, 4 mm  · Tortuous colon       Recommendations:   · Colonoscopy in 5 years likely, based on pathology.  The office will call within the next  3-10 days with a final recommendation.  · Keep a copy of the photographs of the procedure given to you today for possible need for reference in the future.    Surgeon: Jesica    Anesthetic: MAC per Luz Brito MD    Indications:  As above     Scope Withdrawal Time:  8 minutes  45 seconds    Procedure Details     MAC anesthesia was induced.  The 180 Colonoscopy was inserted blindly into the rectum and advanced to the cecum, with relative ease, but with need for pressure, and lift, without turning.  She did have some tight curves and a long colon.  Cecum was identified by the appendiceal orifice and the ileocecal valve and photographed for documentation.      Prep quality was moderate with large areas of opaque liquid stool that could be suctioned with minimal residue.  A careful inspection was made as the scope was withdrawn, including a retroflexed view of the rectum; there was no suggestion of presence of angiodysplasias, colitis, but there was the single polyp.  There were no diverticula, with interventions as listed.  Settings on the cautery were 1 and 11 with tissue retrieved via suction thru the scope.     Retroflexion in the rectum revealed no abnormalities.    Giovana Mooney MD  06/05/19  7:49 AM

## 2019-06-05 NOTE — ANESTHESIA POSTPROCEDURE EVALUATION
"Patient: Kaci HAIRSTON Bradford    Procedure Summary     Date:  06/05/19 Room / Location:  [facility] ENDOSCOPY 10 /  [facility]    Anesthesia Start:  0712 Anesthesia Stop:  0748    Procedure:  COLONOSCOPY TO CECUM WITH POLYPECTOMY HOT SNARE (N/A ) Diagnosis:       Heme positive stool      (Heme positive stool [R19.5])    Surgeon:  Giovana Mooney MD Provider:  Luz Brito MD    Anesthesia Type:  MAC ASA Status:  3          Anesthesia Type: MAC  Last vitals  BP   114/54 (06/05/19 0749)   Temp   36.4 °C (97.5 °F) (06/05/19 0654)   Pulse   76 (06/05/19 0749)   Resp   16 (06/05/19 0749)     SpO2   96 % (06/05/19 0749)     Post Anesthesia Care and Evaluation    Patient location during evaluation: bedside  Patient participation: complete - patient participated  Level of consciousness: awake and alert  Pain management: adequate  Airway patency: patent  Anesthetic complications: No anesthetic complications  PONV Status: none  Cardiovascular status: acceptable  Respiratory status: acceptable  Hydration status: acceptable    Comments: /54 (BP Location: Left arm, Patient Position: Lying)   Pulse 76   Temp 36.4 °C (97.5 °F) (Oral)   Resp 16   Ht 162.6 cm (64\")   Wt 97.3 kg (214 lb 7 oz)   LMP  (LMP Unknown)   SpO2 96%   BMI 36.81 kg/m²         "

## 2019-06-05 NOTE — ANESTHESIA PREPROCEDURE EVALUATION
Anesthesia Evaluation     Patient summary reviewed   history of anesthetic complications: PONV  NPO Solid Status: > 8 hours  NPO Liquid Status: > 8 hours           Airway   Mallampati: II  TM distance: >3 FB  Dental      Pulmonary    (+) pulmonary embolism, a smoker Current Smoked day of surgery, COPD, asthma, sleep apnea on CPAP,   Cardiovascular     Rhythm: regular  Rate: normal    (+) PVD, DVT, hyperlipidemia,       Neuro/Psych  (+) headaches, psychiatric history Depression,     GI/Hepatic/Renal/Endo    (+) obesity,   hypothyroidism,     Musculoskeletal     Abdominal    Substance History      OB/GYN          Other   (+) arthritis                     Anesthesia Plan    ASA 3     MAC   total IV anesthesia  Anesthetic plan, all risks, benefits, and alternatives have been provided, discussed and informed consent has been obtained with: patient.

## 2019-06-06 LAB
CYTO UR: NORMAL
LAB AP CASE REPORT: NORMAL
PATH REPORT.FINAL DX SPEC: NORMAL
PATH REPORT.GROSS SPEC: NORMAL

## 2019-06-07 ENCOUNTER — TELEPHONE (OUTPATIENT)
Dept: SURGERY | Facility: CLINIC | Age: 64
End: 2019-06-07

## 2019-06-07 NOTE — PROGRESS NOTES
Heidi (endoscopy liaison),    Please call patient to inform them of these findings and recommendations and ensure that any pamphlets that were to be given to the patient at the hospital were received.     Please make sure a letter is sent to the patient, recall method entered into the computer and the HIM tab updated as far as need for endoscopy follow up.    Thanks  Dr Mooney    Date of Procedure: 06/05/19     Pre-operative Diagnosis:    · Heme positive stools     Post-operative Diagnosis:  · Distal transverse colon polyp, 4 mm     Procedure: Colonoscopy with snare cautery polypectomy      Findings/Treatments:   · Distal transverse colon polyp, 4 mm;  SERRATED ADENOMA, PROMPTING A C SCOPE IN 5 YEARS  · Tortuous colon    Recommendations:   · Colonoscopy in 5 years likely, based on pathology.  The office will call within the next  3-10 days with a final recommendation.  · Keep a copy of the photographs of the procedure given to you today for possible need for reference in the future.

## 2019-06-07 NOTE — TELEPHONE ENCOUNTER
----- Message from Giovana Mooney MD sent at 6/7/2019  8:36 AM EDT -----  Heidi (endoscopy liaison),    Please call patient to inform them of these findings and recommendations and ensure that any pamphlets that were to be given to the patient at the hospital were received.     Please make sure a letter is sent to the patient, recall method entered into the computer and the HIM tab updated as far as need for endoscopy follow up.    Thanks  Dr Mooney    Date of Procedure: 06/05/19     Pre-operative Diagnosis:    · Heme positive stools     Post-operative Diagnosis:  · Distal transverse colon polyp, 4 mm     Procedure: Colonoscopy with snare cautery polypectomy      Findings/Treatments:   · Distal transverse colon polyp, 4 mm;  SERRATED ADENOMA, PROMPTING A C SCOPE IN 5 YEARS  · Tortuous colon                                        Recommendations:   · Colonoscopy in 5 years likely, based on pathology.  The office will call within the next  3-10 days with a final recommendation.  · Keep a copy of the photographs of the procedure given to you today for possible need for reference in the future.

## 2019-06-11 DIAGNOSIS — F41.9 ANXIETY AND DEPRESSION: ICD-10-CM

## 2019-06-11 DIAGNOSIS — F32.A ANXIETY AND DEPRESSION: ICD-10-CM

## 2019-06-11 DIAGNOSIS — G43.819 OTHER MIGRAINE WITHOUT STATUS MIGRAINOSUS, INTRACTABLE: ICD-10-CM

## 2019-06-11 RX ORDER — BUTALBITAL, ACETAMINOPHEN AND CAFFEINE 50; 325; 40 MG/1; MG/1; MG/1
TABLET ORAL
Qty: 30 TABLET | Refills: 0 | Status: SHIPPED | OUTPATIENT
Start: 2019-06-11 | End: 2019-08-23 | Stop reason: SDUPTHER

## 2019-06-11 NOTE — TELEPHONE ENCOUNTER
Fioricet will increase the number of headaches if a patient takes it as frequently as she is. She needs to see someone about her headaches  
I am sending in 30.   
Pt states she has been getting this many for years.  Some days she does not take any and other days she may take 3 in 1 day.  States she has seen neuro several years ago and dx'd w/ cluster headaches.  She is trying to find a new pcp.  She is asking if you can give her a few to help bridge her until she can find a new pcp    
WDL

## 2019-06-12 RX ORDER — ALPRAZOLAM 1 MG/1
1 TABLET ORAL 2 TIMES DAILY PRN
Qty: 60 TABLET | Refills: 0 | Status: SHIPPED | OUTPATIENT
Start: 2019-06-12 | End: 2019-07-11 | Stop reason: SDUPTHER

## 2019-06-21 ENCOUNTER — LAB (OUTPATIENT)
Dept: ENDOCRINOLOGY | Age: 64
End: 2019-06-21

## 2019-06-21 DIAGNOSIS — E03.9 HYPOTHYROIDISM (ACQUIRED): Primary | ICD-10-CM

## 2019-06-21 DIAGNOSIS — E03.9 HYPOTHYROIDISM (ACQUIRED): ICD-10-CM

## 2019-06-22 LAB
ALBUMIN SERPL-MCNC: 4.8 G/DL (ref 3.5–5.2)
ALBUMIN/GLOB SERPL: 3 G/DL
ALP SERPL-CCNC: 192 U/L (ref 39–117)
ALT SERPL-CCNC: 28 U/L (ref 1–33)
AST SERPL-CCNC: 17 U/L (ref 1–32)
BILIRUB SERPL-MCNC: 0.2 MG/DL (ref 0.2–1.2)
BUN SERPL-MCNC: 10 MG/DL (ref 8–23)
BUN/CREAT SERPL: 12.3 (ref 7–25)
CALCIUM SERPL-MCNC: 9.6 MG/DL (ref 8.6–10.5)
CHLORIDE SERPL-SCNC: 102 MMOL/L (ref 98–107)
CO2 SERPL-SCNC: 28.4 MMOL/L (ref 22–29)
CREAT SERPL-MCNC: 0.81 MG/DL (ref 0.57–1)
GLOBULIN SER CALC-MCNC: 1.6 GM/DL
GLUCOSE SERPL-MCNC: 91 MG/DL (ref 65–99)
POTASSIUM SERPL-SCNC: 4.3 MMOL/L (ref 3.5–5.2)
PROT SERPL-MCNC: 6.4 G/DL (ref 6–8.5)
SODIUM SERPL-SCNC: 143 MMOL/L (ref 136–145)
T3 SERPL-MCNC: 140 NG/DL (ref 80–200)
T4 FREE SERPL-MCNC: 1.15 NG/DL (ref 0.93–1.7)
TSH SERPL DL<=0.005 MIU/L-ACNC: 0.63 MIU/ML (ref 0.27–4.2)

## 2019-06-26 DIAGNOSIS — E78.5 HYPERLIPIDEMIA, UNSPECIFIED HYPERLIPIDEMIA TYPE: ICD-10-CM

## 2019-06-26 RX ORDER — MONTELUKAST SODIUM 10 MG/1
TABLET ORAL
Qty: 90 TABLET | Refills: 1 | Status: SHIPPED | OUTPATIENT
Start: 2019-06-26 | End: 2019-12-17 | Stop reason: SDUPTHER

## 2019-06-26 RX ORDER — ATORVASTATIN CALCIUM 20 MG/1
TABLET, FILM COATED ORAL
Qty: 120 TABLET | Refills: 1 | Status: SHIPPED | OUTPATIENT
Start: 2019-06-26 | End: 2019-08-23 | Stop reason: SDUPTHER

## 2019-07-11 DIAGNOSIS — F32.A ANXIETY AND DEPRESSION: ICD-10-CM

## 2019-07-11 DIAGNOSIS — G43.819 OTHER MIGRAINE WITHOUT STATUS MIGRAINOSUS, INTRACTABLE: ICD-10-CM

## 2019-07-11 DIAGNOSIS — F41.9 ANXIETY AND DEPRESSION: ICD-10-CM

## 2019-07-11 RX ORDER — ALPRAZOLAM 1 MG/1
TABLET ORAL
Qty: 60 TABLET | Refills: 0 | Status: SHIPPED | OUTPATIENT
Start: 2019-07-11 | End: 2019-08-23 | Stop reason: DRUGHIGH

## 2019-07-11 RX ORDER — BUTALBITAL, ACETAMINOPHEN AND CAFFEINE 50; 325; 40 MG/1; MG/1; MG/1
TABLET ORAL
Qty: 30 TABLET | Refills: 0 | Status: SHIPPED | OUTPATIENT
Start: 2019-07-11 | End: 2019-08-23

## 2019-07-11 NOTE — TELEPHONE ENCOUNTER
Please call and let patient know I have refilled this prescription for 30 days and patient will need to get further referrals from their new provider. Their provider is no longer in our office. I cannot refill this again.

## 2019-08-23 ENCOUNTER — OFFICE VISIT (OUTPATIENT)
Dept: FAMILY MEDICINE CLINIC | Facility: CLINIC | Age: 64
End: 2019-08-23

## 2019-08-23 VITALS
BODY MASS INDEX: 37.39 KG/M2 | SYSTOLIC BLOOD PRESSURE: 118 MMHG | DIASTOLIC BLOOD PRESSURE: 64 MMHG | OXYGEN SATURATION: 97 % | TEMPERATURE: 98.4 F | WEIGHT: 219 LBS | HEART RATE: 73 BPM | HEIGHT: 64 IN

## 2019-08-23 DIAGNOSIS — J45.20 MILD INTERMITTENT ASTHMA WITHOUT COMPLICATION: ICD-10-CM

## 2019-08-23 DIAGNOSIS — F41.9 ANXIETY AND DEPRESSION: ICD-10-CM

## 2019-08-23 DIAGNOSIS — F32.A ANXIETY AND DEPRESSION: ICD-10-CM

## 2019-08-23 DIAGNOSIS — E03.8 SUBCLINICAL HYPOTHYROIDISM: ICD-10-CM

## 2019-08-23 DIAGNOSIS — Z78.0 POSTMENOPAUSAL: Primary | ICD-10-CM

## 2019-08-23 DIAGNOSIS — R51.9 CHRONIC DAILY HEADACHE: ICD-10-CM

## 2019-08-23 DIAGNOSIS — E78.5 HYPERLIPIDEMIA, UNSPECIFIED HYPERLIPIDEMIA TYPE: ICD-10-CM

## 2019-08-23 DIAGNOSIS — Z00.00 HEALTHCARE MAINTENANCE: ICD-10-CM

## 2019-08-23 DIAGNOSIS — G43.819 OTHER MIGRAINE WITHOUT STATUS MIGRAINOSUS, INTRACTABLE: ICD-10-CM

## 2019-08-23 DIAGNOSIS — J45.909 UNCOMPLICATED ASTHMA, UNSPECIFIED ASTHMA SEVERITY, UNSPECIFIED WHETHER PERSISTENT: ICD-10-CM

## 2019-08-23 LAB
CHOLEST SERPL-MCNC: 150 MG/DL (ref 0–200)
HDLC SERPL-MCNC: 36 MG/DL (ref 40–60)
LDLC SERPL CALC-MCNC: 80 MG/DL (ref 0–100)
LDLC/HDLC SERPL: 2.21 {RATIO}
TRIGL SERPL-MCNC: 172 MG/DL (ref 0–150)
VLDLC SERPL-MCNC: 34.4 MG/DL (ref 5–40)

## 2019-08-23 PROCEDURE — 36415 COLL VENOUS BLD VENIPUNCTURE: CPT | Performed by: NURSE PRACTITIONER

## 2019-08-23 PROCEDURE — 80061 LIPID PANEL: CPT | Performed by: NURSE PRACTITIONER

## 2019-08-23 PROCEDURE — 99204 OFFICE O/P NEW MOD 45 MIN: CPT | Performed by: NURSE PRACTITIONER

## 2019-08-23 RX ORDER — FOLIC ACID 1 MG/1
1000 TABLET ORAL DAILY
Qty: 90 TABLET | Refills: 1 | Status: SHIPPED | OUTPATIENT
Start: 2019-08-23 | End: 2020-02-27

## 2019-08-23 RX ORDER — LEVOTHYROXINE SODIUM 0.12 MG/1
TABLET ORAL
Qty: 90 TABLET | Refills: 1 | Status: SHIPPED | OUTPATIENT
Start: 2019-08-23 | End: 2020-07-20

## 2019-08-23 RX ORDER — BUTALBITAL, ACETAMINOPHEN AND CAFFEINE 50; 325; 40 MG/1; MG/1; MG/1
1 TABLET ORAL EVERY 6 HOURS PRN
Qty: 60 TABLET | Refills: 5 | Status: SHIPPED | OUTPATIENT
Start: 2019-08-23 | End: 2020-02-12

## 2019-08-23 RX ORDER — FLUOXETINE HYDROCHLORIDE 60 MG/1
60 TABLET, FILM COATED ORAL; ORAL DAILY
Qty: 90 TABLET | Refills: 1 | Status: SHIPPED | OUTPATIENT
Start: 2019-08-23 | End: 2019-12-17 | Stop reason: SDUPTHER

## 2019-08-23 RX ORDER — ESTRADIOL 0.1 MG/G
CREAM VAGINAL
Qty: 1 EACH | Refills: 11 | Status: SHIPPED | OUTPATIENT
Start: 2019-08-23 | End: 2020-08-05 | Stop reason: SDUPTHER

## 2019-08-23 RX ORDER — LEVALBUTEROL TARTRATE 45 UG/1
1-2 AEROSOL, METERED ORAL EVERY 4 HOURS PRN
Qty: 15 G | Refills: 11 | Status: SHIPPED | OUTPATIENT
Start: 2019-08-23 | End: 2020-08-05 | Stop reason: SDUPTHER

## 2019-08-23 RX ORDER — ALPRAZOLAM 0.5 MG/1
0.5 TABLET ORAL 2 TIMES DAILY PRN
Qty: 60 TABLET | Refills: 0 | Status: SHIPPED | OUTPATIENT
Start: 2019-08-23 | End: 2019-10-07 | Stop reason: SDUPTHER

## 2019-08-23 RX ORDER — ATORVASTATIN CALCIUM 20 MG/1
20 TABLET, FILM COATED ORAL
Qty: 90 TABLET | Refills: 1 | Status: SHIPPED | OUTPATIENT
Start: 2019-08-23 | End: 2020-02-21

## 2019-08-23 RX ORDER — ACEBUTOLOL HYDROCHLORIDE 200 MG/1
200 CAPSULE ORAL DAILY
Qty: 30 CAPSULE | Refills: 6 | Status: SHIPPED | OUTPATIENT
Start: 2019-08-23 | End: 2020-08-05 | Stop reason: SDUPTHER

## 2019-08-23 NOTE — PROGRESS NOTES
Ida Mcgee is a 64 y.o. female.     History of Present Illness   Here to est care prev PCP Dr Hickman,  here with , not currently working   With chronic migraines on fioricet worse with pressure change #60 last a month, prev neuro consult, prev amitriptyline and topamax no help  With chronic chol on atorvastatin 20 mg fasting for labs last checked 03/18 well controlled  With chronic angina prev saw Dr Bassett cardiology last stress test 11/17 on acebutolol 200 mg sx stable no CP dizziness HA LE edema  S/p full hyst last mammo, has seen GYN Dr Washington 07/31/18 on estrace cream request refill, sees Dr Granda urology prn  With chronic depression and anxiety on fluoxetine 60 mg and xanax 1 mg BID x many years mother passed from ALS and was her primary caregiver > 20 years, daughter and grandson also with panic, prev seen counseling doesn't think needs at this time  With chronic hypothyroid on levothyroxine 125 mcg daily 1/2 PO daily sees endo Dr Peralta last TSH 06/19  With DARRION on CPAP sx stable sees Dr Trujillo  With chronic asthma and CVID immune deficiency prev infusions caused flu like symptoms on advair and albuterol and singulair 10 mg and has nebulizer fall and winter triggers  Last colonoscopy 06/19 polyp removed FU with Dr Mooney 5 years, with hx of high alk phos has already seen Dr Barakat GI attributes to CVID  Hx of neck sgy and had DVT in past now seeing ortho Dr Randolph pushing L knee replacement sgy as long as possible using pennsaid, Sees Dr Jacobsen Rheum dx fibromyalgia and consulting to see if has lupus with hx of positive SIRI    The following portions of the patient's history were reviewed and updated as appropriate: allergies, current medications, past family history, past medical history, past social history, past surgical history and problem list.    Review of Systems   Constitutional: Negative for fever.   HENT: Negative for trouble swallowing and voice change.     Respiratory: Positive for shortness of breath (intermittent). Negative for cough and wheezing.    Cardiovascular: Negative for chest pain, palpitations and leg swelling.   Endocrine: Negative for cold intolerance, heat intolerance, polydipsia, polyphagia and polyuria.   Genitourinary: Negative for vaginal bleeding, vaginal discharge and vaginal pain (but dryness).   Musculoskeletal: Positive for arthralgias.   Allergic/Immunologic: Positive for environmental allergies.   Neurological: Negative for dizziness and headaches.   Psychiatric/Behavioral: Positive for behavioral problems, dysphoric mood and sleep disturbance. Negative for agitation, confusion, decreased concentration, hallucinations, self-injury and suicidal ideas. The patient is nervous/anxious. The patient is not hyperactive.    All other systems reviewed and are negative.      Objective   Physical Exam   Constitutional: She is oriented to person, place, and time. She appears well-developed and well-nourished.   HENT:   Head: Normocephalic and atraumatic.   Eyes: Conjunctivae and EOM are normal. Pupils are equal, round, and reactive to light.   Cardiovascular: Normal rate, regular rhythm and normal heart sounds.   Pulmonary/Chest: Effort normal and breath sounds normal.   Musculoskeletal: Normal range of motion. She exhibits tenderness (L knee stiff ROM, no effusion).   Neurological: She is alert and oriented to person, place, and time.   Skin: Skin is warm and dry.   Psychiatric: She has a normal mood and affect. Her behavior is normal. Judgment and thought content normal.   Vitals reviewed.      Assessment/Plan   Kaci was seen today for annual exam.    Diagnoses and all orders for this visit:    Postmenopausal    Other migraine without status migrainosus, intractable  -     butalbital-acetaminophen-caffeine (FIORICET, ESGIC) -40 MG per tablet; Take 1 tablet by mouth Every 6 (Six) Hours As Needed for Headache.    Chronic daily headache  -      acebutolol (SECTRAL) 200 MG capsule; Take 1 capsule by mouth Daily.    Healthcare maintenance  -     folic acid (FOLVITE) 1 MG tablet; Take 1 tablet by mouth Daily.    Hyperlipidemia, unspecified hyperlipidemia type  -     atorvastatin (LIPITOR) 20 MG tablet; Take 1 tablet by mouth every night at bedtime.  -     Lipid Panel    Anxiety and depression  -     FLUoxetine (PROzac) 60 MG tablet; Take 1 tablet by mouth Daily.    Uncomplicated asthma, unspecified asthma severity, unspecified whether persistent  -     fluticasone-salmeterol (ADVAIR DISKUS) 500-50 MCG/DOSE DISKUS; Inhale 1 puff 2 (Two) Times a Day.    Mild intermittent asthma without complication  -     levalbuterol (XOPENEX HFA) 45 MCG/ACT inhaler; Inhale 1-2 puffs Every 4 (Four) Hours As Needed for Wheezing or Shortness of Air.    Subclinical hypothyroidism  -     levothyroxine (SYNTHROID, LEVOTHROID) 125 MCG tablet; 1/2 PO daily    Other orders  -     ESTRACE VAGINAL 0.1 MG/GM vaginal cream; Insert intravaginally twice weekly HS  -     ALPRAZolam (XANAX) 0.5 MG tablet; Take 1 tablet by mouth 2 (Two) Times a Day As Needed for Anxiety.    reviewed prev PCP and specialist records, refill chronic dz meds, check labs and call with results, explained she was taking high dose xanax BID and recommend FU with counseling and mental health specialist, defer will restart xanax 0.5 mg BID and may have breakthrough will need to see psych for prescribing higher doses, The patient has read and signed the [facility] Controlled Substance Contract UTD today, will check UDS NCV as has been out of medicine, goyo reviewed and within normal limits.  I will continue to see patient for regular follow up appointments.  They are well controlled on their medication.  GOYO is updated every 3 months. The patient is aware of the potential for addiction and dependence.

## 2019-08-23 NOTE — PATIENT INSTRUCTIONS
reviewed prev PCP and specialist records, refill chronic dz meds, check labs and call with results, explained she was taking high dose xanax BID and recommend FU with counseling and mental health specialist, defer will restart xanax 0.5 mg BID and may have breakthrough will need to see psych for prescribing higher doses, The patient has read and signed the [facility] Controlled Substance Contract UTD today, will check UDS NCV as has been out of medicine, goyo reviewed and within normal limits.  I will continue to see patient for regular follow up appointments.  They are well controlled on their medication.  GOYO is updated every 3 months. The patient is aware of the potential for addiction and dependence.

## 2019-08-26 ENCOUNTER — TELEPHONE (OUTPATIENT)
Dept: FAMILY MEDICINE CLINIC | Facility: CLINIC | Age: 64
End: 2019-08-26

## 2019-08-26 NOTE — TELEPHONE ENCOUNTER
She is having difficulty getting insurance to pay for Estrace. Its 160.00, she said there is a generic estradiol she can use. Please call in ----- Message from SALVATORE Rodriguez sent at 8/23/2019  3:16 PM EDT -----  Chol improved on lipitor LDL 80 goal < 100, triglycerides sl increased 172 goal < 150, enc low chol diet and exercise and we can monitor

## 2019-09-14 ENCOUNTER — APPOINTMENT (OUTPATIENT)
Dept: GENERAL RADIOLOGY | Facility: HOSPITAL | Age: 64
End: 2019-09-14

## 2019-09-14 ENCOUNTER — HOSPITAL ENCOUNTER (EMERGENCY)
Facility: HOSPITAL | Age: 64
Discharge: HOME | End: 2019-09-14
Admitting: EMERGENCY MEDICINE

## 2019-09-14 VITALS
WEIGHT: 220.02 LBS | BODY MASS INDEX: 37.56 KG/M2 | RESPIRATION RATE: 18 BRPM | SYSTOLIC BLOOD PRESSURE: 155 MMHG | DIASTOLIC BLOOD PRESSURE: 78 MMHG | HEART RATE: 67 BPM | TEMPERATURE: 98.8 F | OXYGEN SATURATION: 98 % | HEIGHT: 64 IN

## 2019-09-14 DIAGNOSIS — S52.502A CLOSED FRACTURE OF DISTAL END OF LEFT RADIUS, UNSPECIFIED FRACTURE MORPHOLOGY, INITIAL ENCOUNTER: Primary | ICD-10-CM

## 2019-09-14 PROCEDURE — 99283 EMERGENCY DEPT VISIT LOW MDM: CPT

## 2019-09-14 PROCEDURE — 73110 X-RAY EXAM OF WRIST: CPT

## 2019-09-14 PROCEDURE — 73090 X-RAY EXAM OF FOREARM: CPT

## 2019-09-14 RX ORDER — HYDROCODONE BITARTRATE AND ACETAMINOPHEN 5; 325 MG/1; MG/1
1 TABLET ORAL ONCE AS NEEDED
Status: DISCONTINUED | OUTPATIENT
Start: 2019-09-14 | End: 2019-09-14 | Stop reason: HOSPADM

## 2019-09-14 RX ORDER — ONDANSETRON 4 MG/1
4 TABLET, ORALLY DISINTEGRATING ORAL ONCE
Status: COMPLETED | OUTPATIENT
Start: 2019-09-14 | End: 2019-09-14

## 2019-09-14 RX ORDER — HYDROCODONE BITARTRATE AND ACETAMINOPHEN 7.5; 325 MG/1; MG/1
1 TABLET ORAL EVERY 6 HOURS PRN
Qty: 12 TABLET | Refills: 0 | Status: SHIPPED | OUTPATIENT
Start: 2019-09-14 | End: 2019-11-15

## 2019-09-14 RX ADMIN — HYDROCODONE BITARTRATE AND ACETAMINOPHEN 1 TABLET: 5; 325 TABLET ORAL at 16:33

## 2019-09-14 RX ADMIN — HYDROCODONE BITARTRATE AND ACETAMINOPHEN 1 TABLET: 5; 325 TABLET ORAL at 17:57

## 2019-09-14 RX ADMIN — ONDANSETRON 4 MG: 4 TABLET, ORALLY DISINTEGRATING ORAL at 16:33

## 2019-09-14 NOTE — DISCHARGE INSTRUCTIONS
Wear splint for comfort.  Take medications as directed.  Apply ice every 2 hours while awake, on for 20 minutes.  Call orthopedic on Monday and schedule appointment for Tuesday, as discussed with Dr. Clark.  Return to the ER for new or worsening symptoms.

## 2019-09-14 NOTE — ED PROVIDER NOTES
"Subjective   64-year-old female presents with complaint of left wrist pain status post FOOSH.  Patient reports \"I tripped and fell when a bee was chasing me while was watering the flowers\".  Patient also complains of the left knee abrasion but denies pain.  Denies paresthesias.  Sensation intact.    1. Location:   2. Quality:  3. Severity:  4. Worsening factors:  5. Alleviating factors:  6. Onset:   7. Radiation:  8. Frequency:  9. Co-morbidities:   10. Source:               Review of Systems   Musculoskeletal: Positive for arthralgias.   Skin: Negative for color change, pallor, rash and wound.   Neurological: Negative for numbness.   All other systems reviewed and are negative.      Past Medical History:   Diagnosis Date   • Anxiety and depression    • Aortic sclerosis     followed by Dr. Smith, Vascular Surgery   • Asthma     mild intermittent   • Clotting disorder (CMS/HCC)    • COPD (chronic obstructive pulmonary disease) (CMS/HCC)    • CVID (common variable immunodeficiency) (CMS/HCC)     started to have a bad reaction to IVIG after several years   • Deep vein thrombosis (CMS/HCC)     BEHIND LEFT KNEE, followed by Hematology Ramiro Benavides    • Depression    • DJD (degenerative joint disease), lumbar    • Elevated alkaline phosphatase level     chronic, due to CVID   • Fatty liver 2006 ?   • Fibromyalgia    • History of pneumonia 1999   • Hyperlipidemia    • Hypothyroidism     followed by Endocrinology, Dr. Kuhn   • Lupus     cutaneous, followed by Rheumatology, Dr. Mendoza June   • Migraines    • Multinodular goiter    • MVP (mitral valve prolapse)     followed by Cardiology, Dr. Bassett   • Osteoarthritis     followed by [facility], Dr. Hugo Costa   • Osteoporosis    • PONV (postoperative nausea and vomiting)    • Prediabetes    • Pulmonary embolism (CMS/HCC) 1998    x 4 after neck surgery, took coumadin x 10 yr but did not tolerate it   • Recurrent UTI    • Sciatica    • Sleep apnea     " CPAP Machine #11   • Tobacco abuse        Allergies   Allergen Reactions   • Vimovo [Naproxen-Esomeprazole] Shortness Of Breath and Palpitations   • Codeine Nausea And Vomiting       Past Surgical History:   Procedure Laterality Date   • ABDOMINAL SURGERY  1993    Endometriosis   • CARDIAC CATHETERIZATION     • COLONOSCOPY      Per pt unable to complete due to severely tortuous colon   • COLONOSCOPY N/A 6/5/2019    Procedure: COLONOSCOPY TO CECUM WITH POLYPECTOMY HOT SNARE;  Surgeon: Giovana Mooney MD;  Location: [facility] ENDOSCOPY;  Service: General   • EPIDURAL BLOCK     • EYE SURGERY     • KNEE ARTHROSCOPY  1992   • LAPAROSCOPIC CHOLECYSTECTOMY W/ CHOLANGIOGRAPHY N/A 02/16/2007    Dr. Giovana Mooney   • NECK SURGERY  1998    C 5 AND 6 TITANIUM PLATE   • SKIN BIOPSY      benign   • TOTAL ABDOMINAL HYSTERECTOMY N/A 1993       Family History   Problem Relation Age of Onset   • Thyroid disease Mother         Hypothyroid   • ALS Mother    • Anxiety disorder Mother    • Aortic aneurysm Father    • Heart disease Father    • Breast cancer Sister 50   • Cerebral aneurysm Sister    • Depression Sister    • Hypertension Sister    • Diabetes Sister    • Thyroid cancer Cousin    • Cerebral aneurysm Sister    • Depression Sister    • Hypertension Sister    • Diabetes Sister    • Arthritis Brother    • Hypertension Brother    • Colon polyps Brother         fine   • Cancer Sister         Breast   • Depression Sister    • Depression Sister    • Thyroid disease Sister         Hyperthyroid   • Depression Sister    • Psychosis Maternal Grandfather    • Depression Daughter    • Malig Hyperthermia Neg Hx        Social History     Socioeconomic History   • Marital status:      Spouse name: Not on file   • Number of children: Not on file   • Years of education: High school   • Highest education level: Not on file   Occupational History   • Occupation: Unemployed currently     Employer: RETIRED   Tobacco Use   • Smoking status:  Current Every Day Smoker     Packs/day: 1.00     Years: 30.00     Pack years: 30.00     Types: Cigarettes   • Smokeless tobacco: Never Used   Substance and Sexual Activity   • Alcohol use: No   • Drug use: No   • Sexual activity: Yes     Partners: Male     Birth control/protection: Post-menopausal, Other     Comment: Hysterectomy           Objective   Physical Exam   Constitutional: She is oriented to person, place, and time. She appears well-developed and well-nourished. She is active.   HENT:   Head: Normocephalic and atraumatic.   Cardiovascular: Intact distal pulses.   Abdominal: Soft. Bowel sounds are normal.   Musculoskeletal: She exhibits tenderness and deformity. She exhibits no edema.        Left wrist: She exhibits decreased range of motion, tenderness, bony tenderness, swelling and deformity. She exhibits no effusion, no crepitus and no laceration.   Sensation intact.  Bilateral radial pulses strong and equal 2+.  Cap refill less than 2 seconds.  Patient's wedding ring removed.   Neurological: She is alert and oriented to person, place, and time. No sensory deficit.   Skin: Skin is warm, dry and intact. Capillary refill takes less than 2 seconds. No rash noted.   Psychiatric: She has a normal mood and affect. Her behavior is normal. Judgment and thought content normal.   Nursing note and vitals reviewed.      FX Dislocation  Date/Time: 9/14/2019 6:00 PM  Performed by: Jennifer Hendrix NP  Authorized by: Jennifer Hendrix NP     Consent:     Consent obtained:  Verbal    Consent given by:  Patient    Risks discussed:  Pain    Alternatives discussed:  Referral  Injury:     Injury location:  Forearm    Forearm injury location:  L forearm    Forearm fracture type: distal radial    Pre-procedure assessment:     Neurological function: normal      Distal perfusion: normal      Range of motion: normal    Post-procedure assessment:     Neurological function: normal      Distal perfusion: normal      Range of motion:  normal      Patient tolerance of procedure:  Tolerated well, no immediate complications               ED Course      Xr Forearm 2 View Left    Result Date: 9/14/2019  Acute distal radius fracture.  Electronically Signed By-Ruby Prater On:9/14/2019 4:53 PM This report was finalized on [id number] by  Ruby Prater, .    Xr Wrist 3+ View Left    Result Date: 9/14/2019  Transverse fracture of the distal radial metaphysis with mild dorsal angulation of the distal fracture fragment.  Electronically Signed By-Ruby Prater On:9/14/2019 6:07 PM This report was finalized on [id number] by  Ruby Prater, .    Medications   HYDROcodone-acetaminophen (NORCO) 5-325 MG per tablet 1 tablet (1 tablet Oral Given 9/14/19 1757)   HYDROcodone-acetaminophen (NORCO) 5-325 MG per tablet 1 tablet (not administered)   ondansetron ODT (ZOFRAN-ODT) disintegrating tablet 4 mg (4 mg Oral Given 9/14/19 1633)     Labs Reviewed - No data to display                 MDM  Number of Diagnoses or Management Options  Closed fracture of distal end of left radius, unspecified fracture morphology, initial encounter:   Diagnosis management comments: Chart Review:  Comorbidity:  Imaging: Was interpreted by physician and reviewed by myself: Xr Forearm 2 View Left    Result Date: 9/14/2019  Acute distal radius fracture.  Electronically Signed ByJorge Prater On:9/14/2019 4:53 PM This report was finalized on [id number] by  Ruby Prater, .  Disposition/Treatment: Discussed results with patient, verbalized understanding.  Agreeable with plan of care.    Patient undressed and placed in gown for exam. Norco 5/325mg PO for pain. Short arm splint applied, see procedure for further.  Patient reports increased pain, Norco 5/325 mg given.  Spoke with Dr. Clark who reviewed patient's x-rays and would like her to come to the clinic on Tuesday for follow-up and to schedule surgery.  Patient encouraged to return to the ER for new or worsening  symptoms.         Amount and/or Complexity of Data Reviewed  Tests in the radiology section of CPT®: reviewed        Final diagnoses:   Closed fracture of distal end of left radius, unspecified fracture morphology, initial encounter              Jennifer Hendrix NP  09/14/19 9126

## 2019-10-07 RX ORDER — ALPRAZOLAM 0.5 MG/1
TABLET ORAL
Qty: 60 TABLET | Refills: 0 | Status: SHIPPED | OUTPATIENT
Start: 2019-10-07 | End: 2019-11-07 | Stop reason: SDUPTHER

## 2019-10-23 ENCOUNTER — TRANSCRIBE ORDERS (OUTPATIENT)
Dept: ADMINISTRATIVE | Facility: HOSPITAL | Age: 64
End: 2019-10-23

## 2019-10-23 DIAGNOSIS — D83.9 CVID (COMMON VARIABLE IMMUNODEFICIENCY) (HCC): ICD-10-CM

## 2019-10-23 DIAGNOSIS — F17.200 NICOTINE DEPENDENCE WITH CURRENT USE: ICD-10-CM

## 2019-10-23 DIAGNOSIS — M32.9 LUPUS (HCC): ICD-10-CM

## 2019-10-23 DIAGNOSIS — R76.8 FALSE POSITIVE SEROLOGICAL TEST FOR SYPHILIS: Primary | ICD-10-CM

## 2019-11-07 RX ORDER — ALPRAZOLAM 0.5 MG/1
TABLET ORAL
Qty: 60 TABLET | Refills: 0 | Status: SHIPPED | OUTPATIENT
Start: 2019-11-07 | End: 2019-12-06 | Stop reason: SDUPTHER

## 2019-11-11 ENCOUNTER — HOSPITAL ENCOUNTER (OUTPATIENT)
Dept: CARDIOLOGY | Facility: HOSPITAL | Age: 64
Discharge: HOME | End: 2019-11-11

## 2019-11-11 ENCOUNTER — HOSPITAL ENCOUNTER (OUTPATIENT)
Dept: GENERAL RADIOLOGY | Facility: HOSPITAL | Age: 64
Discharge: HOME | End: 2019-11-11

## 2019-11-11 ENCOUNTER — HOSPITAL ENCOUNTER (OUTPATIENT)
Dept: RESPIRATORY THERAPY | Facility: HOSPITAL | Age: 64
Discharge: HOME | End: 2019-11-11
Admitting: NURSE PRACTITIONER

## 2019-11-11 VITALS
SYSTOLIC BLOOD PRESSURE: 130 MMHG | HEART RATE: 74 BPM | HEIGHT: 64 IN | DIASTOLIC BLOOD PRESSURE: 80 MMHG | BODY MASS INDEX: 37.56 KG/M2 | WEIGHT: 220 LBS

## 2019-11-11 DIAGNOSIS — R06.00 DYSPNEA, UNSPECIFIED TYPE: ICD-10-CM

## 2019-11-11 DIAGNOSIS — R76.8 FALSE POSITIVE SEROLOGICAL TEST FOR SYPHILIS: ICD-10-CM

## 2019-11-11 DIAGNOSIS — D83.9 CVID (COMMON VARIABLE IMMUNODEFICIENCY) (HCC): ICD-10-CM

## 2019-11-11 DIAGNOSIS — M32.9 LUPUS (HCC): ICD-10-CM

## 2019-11-11 DIAGNOSIS — L93.0 LUPUS ERYTHEMATOSUS, UNSPECIFIED FORM: ICD-10-CM

## 2019-11-11 DIAGNOSIS — D83.9 COMMON VARIABLE IMMUNODEFICIENCY (HCC): ICD-10-CM

## 2019-11-11 DIAGNOSIS — F17.200 NICOTINE DEPENDENCE WITH CURRENT USE: ICD-10-CM

## 2019-11-11 DIAGNOSIS — F17.200 TOBACCO USE DISORDER: ICD-10-CM

## 2019-11-11 LAB
AORTIC ROOT ANNULUS: 2.4 CM
ASCENDING AORTA: 3.2 CM
BH CV ECHO MEAS - ACS: 1.7 CM
BH CV ECHO MEAS - AO MAX PG (FULL): 3.6 MMHG
BH CV ECHO MEAS - AO MAX PG: 10.2 MMHG
BH CV ECHO MEAS - AO MEAN PG (FULL): 2.9 MMHG
BH CV ECHO MEAS - AO MEAN PG: 6.4 MMHG
BH CV ECHO MEAS - AO V2 MAX: 159.8 CM/SEC
BH CV ECHO MEAS - AO V2 MEAN: 121.9 CM/SEC
BH CV ECHO MEAS - AO V2 VTI: 39.9 CM
BH CV ECHO MEAS - ASC AORTA: 3.2 CM
BH CV ECHO MEAS - AVA(I,A): 2.4 CM^2
BH CV ECHO MEAS - AVA(I,D): 2.4 CM^2
BH CV ECHO MEAS - AVA(V,A): 2.7 CM^2
BH CV ECHO MEAS - AVA(V,D): 2.7 CM^2
BH CV ECHO MEAS - BSA(HAYCOCK): 2.2 M^2
BH CV ECHO MEAS - BSA: 2 M^2
BH CV ECHO MEAS - BZI_BMI: 37.8 KILOGRAMS/M^2
BH CV ECHO MEAS - BZI_METRIC_HEIGHT: 162.6 CM
BH CV ECHO MEAS - BZI_METRIC_WEIGHT: 99.8 KG
BH CV ECHO MEAS - EDV(MOD-SP2): 91 ML
BH CV ECHO MEAS - EDV(MOD-SP4): 76 ML
BH CV ECHO MEAS - EDV(TEICH): 139.5 ML
BH CV ECHO MEAS - EF(CUBED): 78.6 %
BH CV ECHO MEAS - EF(MOD-BP): 67 %
BH CV ECHO MEAS - EF(MOD-SP2): 69.2 %
BH CV ECHO MEAS - EF(MOD-SP4): 64.5 %
BH CV ECHO MEAS - EF(TEICH): 70.3 %
BH CV ECHO MEAS - ESV(MOD-SP2): 28 ML
BH CV ECHO MEAS - ESV(MOD-SP4): 27 ML
BH CV ECHO MEAS - ESV(TEICH): 41.4 ML
BH CV ECHO MEAS - FS: 40.2 %
BH CV ECHO MEAS - IVS/LVPW: 1
BH CV ECHO MEAS - IVSD: 1.1 CM
BH CV ECHO MEAS - LAT PEAK E' VEL: 7 CM/SEC
BH CV ECHO MEAS - LV DIASTOLIC VOL/BSA (35-75): 37.3 ML/M^2
BH CV ECHO MEAS - LV MASS(C)D: 244 GRAMS
BH CV ECHO MEAS - LV MASS(C)DI: 119.8 GRAMS/M^2
BH CV ECHO MEAS - LV MAX PG: 6.6 MMHG
BH CV ECHO MEAS - LV MEAN PG: 3.5 MMHG
BH CV ECHO MEAS - LV SYSTOLIC VOL/BSA (12-30): 13.3 ML/M^2
BH CV ECHO MEAS - LV V1 MAX: 128.5 CM/SEC
BH CV ECHO MEAS - LV V1 MEAN: 83.6 CM/SEC
BH CV ECHO MEAS - LV V1 VTI: 29.2 CM
BH CV ECHO MEAS - LVIDD: 5.4 CM
BH CV ECHO MEAS - LVIDS: 3.2 CM
BH CV ECHO MEAS - LVLD AP2: 7.9 CM
BH CV ECHO MEAS - LVLD AP4: 7.5 CM
BH CV ECHO MEAS - LVLS AP2: 6.8 CM
BH CV ECHO MEAS - LVLS AP4: 6.1 CM
BH CV ECHO MEAS - LVOT AREA (M): 3.5 CM^2
BH CV ECHO MEAS - LVOT AREA: 3.4 CM^2
BH CV ECHO MEAS - LVOT DIAM: 2.1 CM
BH CV ECHO MEAS - LVPWD: 1.1 CM
BH CV ECHO MEAS - MED PEAK E' VEL: 8 CM/SEC
BH CV ECHO MEAS - MV A DUR: 0.12 SEC
BH CV ECHO MEAS - MV A MAX VEL: 115.4 CM/SEC
BH CV ECHO MEAS - MV DEC SLOPE: 342.5 CM/SEC^2
BH CV ECHO MEAS - MV DEC TIME: 0.25 SEC
BH CV ECHO MEAS - MV E MAX VEL: 86.8 CM/SEC
BH CV ECHO MEAS - MV E/A: 0.75
BH CV ECHO MEAS - MV MAX PG: 5.2 MMHG
BH CV ECHO MEAS - MV MEAN PG: 2.5 MMHG
BH CV ECHO MEAS - MV P1/2T MAX VEL: 87.8 CM/SEC
BH CV ECHO MEAS - MV P1/2T: 75.1 MSEC
BH CV ECHO MEAS - MV V2 MAX: 114 CM/SEC
BH CV ECHO MEAS - MV V2 MEAN: 74.4 CM/SEC
BH CV ECHO MEAS - MV V2 VTI: 40.1 CM
BH CV ECHO MEAS - MVA P1/2T LCG: 2.5 CM^2
BH CV ECHO MEAS - MVA(P1/2T): 2.9 CM^2
BH CV ECHO MEAS - MVA(VTI): 2.4 CM^2
BH CV ECHO MEAS - PA ACC TIME: 0.15 SEC
BH CV ECHO MEAS - PA MAX PG (FULL): 2.5 MMHG
BH CV ECHO MEAS - PA MAX PG: 5.3 MMHG
BH CV ECHO MEAS - PA PR(ACCEL): 10.9 MMHG
BH CV ECHO MEAS - PA V2 MAX: 114.8 CM/SEC
BH CV ECHO MEAS - PVA(V,A): 2.1 CM^2
BH CV ECHO MEAS - PVA(V,D): 2.1 CM^2
BH CV ECHO MEAS - QP/QS: 0.68
BH CV ECHO MEAS - RV MAX PG: 2.8 MMHG
BH CV ECHO MEAS - RV MEAN PG: 1.9 MMHG
BH CV ECHO MEAS - RV V1 MAX: 83.4 CM/SEC
BH CV ECHO MEAS - RV V1 MEAN: 67.8 CM/SEC
BH CV ECHO MEAS - RV V1 VTI: 22.9 CM
BH CV ECHO MEAS - RVOT AREA: 2.9 CM^2
BH CV ECHO MEAS - RVOT DIAM: 1.9 CM
BH CV ECHO MEAS - SI(CUBED): 59.7 ML/M^2
BH CV ECHO MEAS - SI(LVOT): 48 ML/M^2
BH CV ECHO MEAS - SI(MOD-SP2): 30.9 ML/M^2
BH CV ECHO MEAS - SI(MOD-SP4): 24.1 ML/M^2
BH CV ECHO MEAS - SI(TEICH): 48.2 ML/M^2
BH CV ECHO MEAS - SV(CUBED): 121.7 ML
BH CV ECHO MEAS - SV(LVOT): 97.8 ML
BH CV ECHO MEAS - SV(MOD-SP2): 63 ML
BH CV ECHO MEAS - SV(MOD-SP4): 49 ML
BH CV ECHO MEAS - SV(RVOT): 66.8 ML
BH CV ECHO MEAS - SV(TEICH): 98.1 ML
BH CV ECHO MEAS - TAPSE (>1.6): 2.5 CM2
BH CV ECHO MEASUREMENTS AVERAGE E/E' RATIO: 11.57
BH CV XLRA - RV BASE: 2.3 CM
BH CV XLRA - RV LENGTH: 6.1 CM
BH CV XLRA - RV MID: 1.7 CM
BH CV XLRA - TDI S': 14 CM/SEC
LEFT ATRIUM VOLUME INDEX: 16 ML/M2
SINUS: 3.4 CM
STJ: 2.8 CM

## 2019-11-11 PROCEDURE — 93306 TTE W/DOPPLER COMPLETE: CPT | Performed by: INTERNAL MEDICINE

## 2019-11-11 PROCEDURE — 93306 TTE W/DOPPLER COMPLETE: CPT

## 2019-11-11 PROCEDURE — 71046 X-RAY EXAM CHEST 2 VIEWS: CPT

## 2019-11-15 ENCOUNTER — OFFICE VISIT (OUTPATIENT)
Dept: FAMILY MEDICINE CLINIC | Facility: CLINIC | Age: 64
End: 2019-11-15

## 2019-11-15 VITALS
HEIGHT: 64 IN | DIASTOLIC BLOOD PRESSURE: 74 MMHG | WEIGHT: 216 LBS | TEMPERATURE: 98.1 F | OXYGEN SATURATION: 98 % | SYSTOLIC BLOOD PRESSURE: 120 MMHG | HEART RATE: 72 BPM | BODY MASS INDEX: 36.88 KG/M2

## 2019-11-15 DIAGNOSIS — J20.9 ACUTE BRONCHITIS, UNSPECIFIED ORGANISM: Primary | ICD-10-CM

## 2019-11-15 DIAGNOSIS — Z72.0 TOBACCO ABUSE: ICD-10-CM

## 2019-11-15 DIAGNOSIS — J44.1 CHRONIC OBSTRUCTIVE PULMONARY DISEASE WITH ACUTE EXACERBATION (HCC): ICD-10-CM

## 2019-11-15 DIAGNOSIS — J30.2 SEASONAL ALLERGIC RHINITIS, UNSPECIFIED TRIGGER: ICD-10-CM

## 2019-11-15 DIAGNOSIS — J45.21 MILD INTERMITTENT ASTHMA WITH ACUTE EXACERBATION: ICD-10-CM

## 2019-11-15 PROBLEM — R19.5 FECAL OCCULT BLOOD TEST POSITIVE: Status: RESOLVED | Noted: 2018-06-11 | Resolved: 2019-11-15

## 2019-11-15 PROBLEM — R19.5 HEME POSITIVE STOOL: Status: RESOLVED | Noted: 2019-05-08 | Resolved: 2019-11-15

## 2019-11-15 PROBLEM — K59.09 OTHER CONSTIPATION: Status: RESOLVED | Noted: 2018-06-11 | Resolved: 2019-11-15

## 2019-11-15 PROCEDURE — 99213 OFFICE O/P EST LOW 20 MIN: CPT | Performed by: NURSE PRACTITIONER

## 2019-11-15 RX ORDER — GUAIFENESIN AND CODEINE PHOSPHATE 100; 10 MG/5ML; MG/5ML
5 SOLUTION ORAL 4 TIMES DAILY PRN
Qty: 120 ML | Refills: 0 | Status: SHIPPED | OUTPATIENT
Start: 2019-11-15 | End: 2020-08-05

## 2019-11-15 RX ORDER — METHYLPREDNISOLONE 4 MG/1
TABLET ORAL
Qty: 21 TABLET | Refills: 0 | Status: SHIPPED | OUTPATIENT
Start: 2019-11-15 | End: 2020-08-05

## 2019-11-15 NOTE — PATIENT INSTRUCTIONS
erx medrol dose pack use as directed, erx cheratussin cough syrup may cause sedation, increase H20 and rest, call or RTC if sx persist or worsen, enc smoking cessation, cont inhaler, nebulizer prn, reviewed nl CXR and echo results with pt

## 2019-11-15 NOTE — PROGRESS NOTES
Subjective   Kaci Mcgee is a 64 y.o. female.     History of Present Illness   C/o voice hoarseness and prod cough with wheezing, no fevers currently but when first started, SOA or ear pain, with some sinus congestion no sore throat, notes sx started after getting flu shot approx 3-4 wks ago at [facility], went to dentist when first started and had 10 day amox for teeth and thinks helped but didn't resolve fully, With chronic allergies asthma and COPD on singulair 10 mg, zyrtec 10 mg, advair and xopenex prn, With hx of pna and bronchitis recently had CXR from Rheum normal 11/11/19, has at home nebulizer using     The following portions of the patient's history were reviewed and updated as appropriate: allergies, current medications, past family history, past medical history, past social history, past surgical history and problem list.    Review of Systems   Constitutional: Positive for fatigue. Negative for fever.   HENT: Positive for congestion, facial swelling, postnasal drip, sinus pressure and sinus pain. Negative for dental problem, drooling, ear discharge, ear pain, hearing loss, mouth sores, nosebleeds, rhinorrhea, sneezing, sore throat, tinnitus, trouble swallowing and voice change.    Respiratory: Positive for cough, chest tightness and wheezing. Negative for apnea, choking, shortness of breath and stridor.    Cardiovascular: Negative for chest pain, palpitations and leg swelling.   Neurological: Negative for dizziness and headaches.   All other systems reviewed and are negative.      Objective   Physical Exam   Constitutional: She is oriented to person, place, and time. She appears well-developed and well-nourished.   HENT:   Head: Normocephalic and atraumatic.   Right Ear: Hearing and tympanic membrane normal.   Left Ear: Hearing and tympanic membrane normal.   Nose: Mucosal edema present. Right sinus exhibits maxillary sinus tenderness and frontal sinus tenderness. Left sinus exhibits maxillary  sinus tenderness and frontal sinus tenderness.   Mouth/Throat: Oropharynx is clear and moist.   Eyes: Conjunctivae and EOM are normal. Pupils are equal, round, and reactive to light.   Neck: Normal range of motion. Neck supple. No thyromegaly present.   Cardiovascular: Normal rate, regular rhythm and normal heart sounds.   Pulmonary/Chest: Effort normal. She has wheezes (SHILA).   Musculoskeletal: Normal range of motion.   Lymphadenopathy:     She has cervical adenopathy.   Neurological: She is alert and oriented to person, place, and time.   Skin: Skin is warm and dry.   Psychiatric: She has a normal mood and affect. Her behavior is normal. Judgment and thought content normal.   Vitals reviewed.      Assessment/Plan   Kaci was seen today for bronchitis.    Diagnoses and all orders for this visit:    Acute bronchitis, unspecified organism    Tobacco abuse    Seasonal allergic rhinitis, unspecified trigger    Chronic obstructive pulmonary disease with acute exacerbation (CMS/HCC)    Mild intermittent asthma with acute exacerbation    Other orders  -     methylPREDNISolone (MEDROL, DERRICK,) 4 MG tablet; Take as directed on package instructions.  -     guaiFENesin-codeine (CHERATUSSIN AC) 100-10 MG/5ML liquid; Take 5 mL by mouth 4 (Four) Times a Day As Needed for Cough.    erx medrol dose pack use as directed, erx cheratussin cough syrup may cause sedation, increase H20 and rest, call or RTC if sx persist or worsen, enc smoking cessation, cont inhaler, nebulizer prn, reviewed nl CXR and echo results with pt

## 2019-12-03 ENCOUNTER — APPOINTMENT (OUTPATIENT)
Dept: RESPIRATORY THERAPY | Facility: HOSPITAL | Age: 64
End: 2019-12-03

## 2019-12-06 DIAGNOSIS — Z79.899 LONG-TERM USE OF HIGH-RISK MEDICATION: Primary | ICD-10-CM

## 2019-12-06 RX ORDER — ALPRAZOLAM 0.5 MG/1
TABLET ORAL
Qty: 60 TABLET | Refills: 0 | Status: SHIPPED | OUTPATIENT
Start: 2019-12-06 | End: 2020-01-03

## 2019-12-17 DIAGNOSIS — F41.9 ANXIETY AND DEPRESSION: ICD-10-CM

## 2019-12-17 DIAGNOSIS — R51.9 CHRONIC DAILY HEADACHE: ICD-10-CM

## 2019-12-17 DIAGNOSIS — F32.A ANXIETY AND DEPRESSION: ICD-10-CM

## 2019-12-17 RX ORDER — ACEBUTOLOL HYDROCHLORIDE 200 MG/1
CAPSULE ORAL
Qty: 30 CAPSULE | Refills: 0 | Status: SHIPPED | OUTPATIENT
Start: 2019-12-17 | End: 2020-08-05

## 2019-12-17 RX ORDER — FLUOXETINE HYDROCHLORIDE 60 MG/1
TABLET, FILM COATED ORAL; ORAL
Qty: 90 TABLET | Refills: 0 | Status: SHIPPED | OUTPATIENT
Start: 2019-12-17 | End: 2020-03-17

## 2019-12-17 RX ORDER — MONTELUKAST SODIUM 10 MG/1
TABLET ORAL
Qty: 90 TABLET | Refills: 0 | Status: SHIPPED | OUTPATIENT
Start: 2019-12-17 | End: 2020-03-16

## 2019-12-17 RX ORDER — MONTELUKAST SODIUM 10 MG/1
TABLET ORAL
Qty: 90 TABLET | Refills: 0 | OUTPATIENT
Start: 2019-12-17

## 2020-01-03 DIAGNOSIS — F41.9 ANXIETY: Primary | ICD-10-CM

## 2020-01-03 RX ORDER — ALPRAZOLAM 0.5 MG/1
TABLET ORAL
Qty: 60 TABLET | Refills: 0 | Status: SHIPPED | OUTPATIENT
Start: 2020-01-03 | End: 2020-02-04

## 2020-01-06 ENCOUNTER — LAB (OUTPATIENT)
Dept: FAMILY MEDICINE CLINIC | Facility: CLINIC | Age: 65
End: 2020-01-06

## 2020-01-06 DIAGNOSIS — Z79.899 LONG-TERM USE OF HIGH-RISK MEDICATION: ICD-10-CM

## 2020-01-11 LAB — CONV REPORT SUMMARY: NORMAL

## 2020-01-29 RX ORDER — ACEBUTOLOL HYDROCHLORIDE 200 MG/1
CAPSULE ORAL
Qty: 30 CAPSULE | Refills: 3 | Status: SHIPPED | OUTPATIENT
Start: 2020-01-29 | End: 2020-05-26

## 2020-02-04 DIAGNOSIS — F41.9 ANXIETY: ICD-10-CM

## 2020-02-04 RX ORDER — ALPRAZOLAM 0.5 MG/1
TABLET ORAL
Qty: 60 TABLET | Refills: 0 | Status: SHIPPED | OUTPATIENT
Start: 2020-02-04 | End: 2020-03-03

## 2020-02-12 DIAGNOSIS — G43.819 OTHER MIGRAINE WITHOUT STATUS MIGRAINOSUS, INTRACTABLE: ICD-10-CM

## 2020-02-12 RX ORDER — BUTALBITAL, ACETAMINOPHEN AND CAFFEINE 50; 325; 40 MG/1; MG/1; MG/1
TABLET ORAL
Qty: 60 TABLET | Refills: 2 | Status: SHIPPED | OUTPATIENT
Start: 2020-02-12 | End: 2020-05-04

## 2020-02-21 DIAGNOSIS — E78.5 HYPERLIPIDEMIA, UNSPECIFIED HYPERLIPIDEMIA TYPE: ICD-10-CM

## 2020-02-21 RX ORDER — ATORVASTATIN CALCIUM 20 MG/1
TABLET, FILM COATED ORAL
Qty: 90 TABLET | Refills: 1 | Status: SHIPPED | OUTPATIENT
Start: 2020-02-21 | End: 2020-08-05 | Stop reason: SDUPTHER

## 2020-02-27 DIAGNOSIS — Z00.00 HEALTHCARE MAINTENANCE: ICD-10-CM

## 2020-02-27 RX ORDER — FOLIC ACID 1 MG/1
TABLET ORAL
Qty: 90 TABLET | Refills: 1 | Status: SHIPPED | OUTPATIENT
Start: 2020-02-27 | End: 2020-08-05 | Stop reason: SDUPTHER

## 2020-03-03 DIAGNOSIS — F41.9 ANXIETY: ICD-10-CM

## 2020-03-03 RX ORDER — ALPRAZOLAM 0.5 MG/1
TABLET ORAL
Qty: 60 TABLET | Refills: 0 | Status: SHIPPED | OUTPATIENT
Start: 2020-03-03 | End: 2020-04-02

## 2020-03-16 RX ORDER — MONTELUKAST SODIUM 10 MG/1
TABLET ORAL
Qty: 90 TABLET | Refills: 3 | Status: SHIPPED | OUTPATIENT
Start: 2020-03-16 | End: 2021-04-02

## 2020-03-17 DIAGNOSIS — F41.9 ANXIETY AND DEPRESSION: ICD-10-CM

## 2020-03-17 DIAGNOSIS — F32.A ANXIETY AND DEPRESSION: ICD-10-CM

## 2020-03-17 RX ORDER — FLUOXETINE HYDROCHLORIDE 60 MG/1
TABLET, FILM COATED ORAL; ORAL
Qty: 90 TABLET | Refills: 3 | Status: SHIPPED | OUTPATIENT
Start: 2020-03-17 | End: 2021-03-10

## 2020-04-02 DIAGNOSIS — F41.9 ANXIETY: ICD-10-CM

## 2020-04-02 RX ORDER — ALPRAZOLAM 0.5 MG/1
TABLET ORAL
Qty: 60 TABLET | Refills: 0 | Status: SHIPPED | OUTPATIENT
Start: 2020-04-02 | End: 2020-05-04

## 2020-05-04 DIAGNOSIS — G43.819 OTHER MIGRAINE WITHOUT STATUS MIGRAINOSUS, INTRACTABLE: ICD-10-CM

## 2020-05-04 DIAGNOSIS — F41.9 ANXIETY: ICD-10-CM

## 2020-05-04 RX ORDER — ALPRAZOLAM 0.5 MG/1
TABLET ORAL
Qty: 60 TABLET | Refills: 0 | Status: SHIPPED | OUTPATIENT
Start: 2020-05-04 | End: 2020-06-02

## 2020-05-04 RX ORDER — BUTALBITAL, ACETAMINOPHEN AND CAFFEINE 50; 325; 40 MG/1; MG/1; MG/1
TABLET ORAL
Qty: 60 TABLET | Refills: 5 | Status: SHIPPED | OUTPATIENT
Start: 2020-05-04 | End: 2020-09-25 | Stop reason: SDUPTHER

## 2020-05-26 RX ORDER — ACEBUTOLOL HYDROCHLORIDE 200 MG/1
CAPSULE ORAL
Qty: 30 CAPSULE | Refills: 0 | Status: SHIPPED | OUTPATIENT
Start: 2020-05-26 | End: 2020-06-29

## 2020-06-02 DIAGNOSIS — F41.9 ANXIETY: ICD-10-CM

## 2020-06-02 RX ORDER — ALPRAZOLAM 0.5 MG/1
TABLET ORAL
Qty: 60 TABLET | Refills: 0 | Status: SHIPPED | OUTPATIENT
Start: 2020-06-02 | End: 2020-07-02

## 2020-06-22 DIAGNOSIS — J45.909 UNCOMPLICATED ASTHMA, UNSPECIFIED ASTHMA SEVERITY, UNSPECIFIED WHETHER PERSISTENT: ICD-10-CM

## 2020-06-22 RX ORDER — FLUCONAZOLE 150 MG/1
150 TABLET ORAL ONCE
Qty: 1 TABLET | Refills: 0 | Status: SHIPPED | OUTPATIENT
Start: 2020-06-22 | End: 2020-06-29

## 2020-06-24 ENCOUNTER — OFFICE VISIT (OUTPATIENT)
Dept: SLEEP MEDICINE | Facility: HOSPITAL | Age: 65
End: 2020-06-24

## 2020-06-24 VITALS — HEIGHT: 64 IN | WEIGHT: 212 LBS | BODY MASS INDEX: 36.19 KG/M2

## 2020-06-24 DIAGNOSIS — G47.33 OSA ON CPAP: Primary | ICD-10-CM

## 2020-06-24 DIAGNOSIS — Z99.89 OSA ON CPAP: Primary | ICD-10-CM

## 2020-06-24 DIAGNOSIS — IMO0002 SLEEP-RELATED HYPOVENTILATION: ICD-10-CM

## 2020-06-24 DIAGNOSIS — E66.01 CLASS 2 SEVERE OBESITY DUE TO EXCESS CALORIES WITH SERIOUS COMORBIDITY AND BODY MASS INDEX (BMI) OF 36.0 TO 36.9 IN ADULT (HCC): ICD-10-CM

## 2020-06-24 PROCEDURE — 99214 OFFICE O/P EST MOD 30 MIN: CPT | Performed by: INTERNAL MEDICINE

## 2020-06-24 NOTE — PROGRESS NOTES
"Follow Up [facility] Note     Chief Complaint:  DARRION     Primary Care Physician: Elaine Fernandez APRN    Interval History:   I last saw the patient in November 2018.  The patient reports she is stable and unchanged.  The patient needs a new DME.    The patient goes to bed at midnight and awakens at 9 AM.  She does not use the restroom during the nighttime.  [place] Sleepiness Scale is normal at 2.    I reviewed the patient's past medical history and she continues to smoke despite having COPD with occasional exacerbation with acute bronchitis.  The patient also has a history of positive centromere antibody with positive SIRI anti-centromere pattern without evidence of scleroderma.  She has been diagnosed with discoid lupus.  She also has common variable immunodeficiency.    The patient reports no significant changes in her family history.    Review of Systems:    A complete review of systems was done and all were negative with the exception of the above and some anxiety    Social History:    Social History     Socioeconomic History   • Marital status:      Spouse name: Not on file   • Number of children: Not on file   • Years of education: High school   • Highest education level: Not on file   Occupational History   • Occupation: Unemployed currently     Employer: RETIRED   Tobacco Use   • Smoking status: Current Every Day Smoker     Packs/day: 1.00     Years: 30.00     Pack years: 30.00     Types: Cigarettes   • Smokeless tobacco: Never Used   Substance and Sexual Activity   • Alcohol use: No   • Drug use: No   • Sexual activity: Yes     Partners: Male     Birth control/protection: Post-menopausal, Other     Comment: Hysterectomy       Allergies:  Vimovo [naproxen-esomeprazole] and Codeine     Medication Review:  Reviewed.      Vital Signs:    Vitals:    06/24/20 1054   Weight: 96.2 kg (212 lb)   Height: 162.6 cm (64\")     Body mass index is 36.39 kg/m².    Physical Exam:    Constitutional:  Well " developed 65 y.o. female that appears in no apparent distress.  Awake & oriented times 3.  Normal mood with normal recent and remote memory and normal judgement.  Eyes:  Conjunctivae normal.  Oropharynx: Previously, moist mucous membranes without exudate and a large tongue and uvula and the uvula is slightly deformed and deviated to the left as she has a patent posterior pharyngeal opening and class II-3 Mallampati airway, patient is wearing a facemask.  Neck: Trachea midline  Respiratory: Effort not labored  Musculoskeletal: No clubbing and the patient ambulates with a cane     Results Review: The patient uses a fullface mask.  Downloads between 3/26 and 6/23/2020 compliance 100%.  Average usage is 8 hours and 41 minutes.  Average AHI is normal without leak.  Average AutoCPAP pressure is 17.4 and her auto CPAP is 12-20.       Impression:   Obstructive sleep apnea with sleep-related hypoxia by overnight polysomnogram 6/4/2009 adequately treated with auto CPAP with good compliance and usage and no complaints of hypersomnolence.    Plan:  Good sleep hygiene measures should be maintained.  Weight loss would be beneficial in this patient who is obese by BMI.  The patient is benefiting from the treatment being provided.     The patient will continue auto CPAP and a new prescription will be sent to a new DME for all needed supplies.    The patient will call for any problems and will follow up in 1 year.      Hector Trujillo MD  Sleep Medicine  06/24/20  10:58

## 2020-06-27 PROBLEM — E66.812 CLASS 2 SEVERE OBESITY DUE TO EXCESS CALORIES WITH SERIOUS COMORBIDITY AND BODY MASS INDEX (BMI) OF 36.0 TO 36.9 IN ADULT: Status: ACTIVE | Noted: 2020-06-27

## 2020-06-27 PROBLEM — IMO0002 SLEEP-RELATED HYPOVENTILATION: Status: ACTIVE | Noted: 2020-06-27

## 2020-06-27 PROBLEM — E66.01 CLASS 2 SEVERE OBESITY DUE TO EXCESS CALORIES WITH SERIOUS COMORBIDITY AND BODY MASS INDEX (BMI) OF 36.0 TO 36.9 IN ADULT (HCC): Status: ACTIVE | Noted: 2020-06-27

## 2020-06-29 DIAGNOSIS — F41.9 ANXIETY: ICD-10-CM

## 2020-06-29 RX ORDER — FLUCONAZOLE 150 MG/1
TABLET ORAL
Qty: 1 TABLET | Refills: 0 | Status: SHIPPED | OUTPATIENT
Start: 2020-06-29 | End: 2020-08-05

## 2020-06-29 RX ORDER — FLUCONAZOLE 150 MG/1
TABLET ORAL
Qty: 1 TABLET | Refills: 0 | OUTPATIENT
Start: 2020-06-29

## 2020-06-29 RX ORDER — ACEBUTOLOL HYDROCHLORIDE 200 MG/1
CAPSULE ORAL
Qty: 30 CAPSULE | Refills: 0 | Status: SHIPPED | OUTPATIENT
Start: 2020-06-29 | End: 2020-07-31

## 2020-06-29 RX ORDER — ALPRAZOLAM 0.5 MG/1
TABLET ORAL
Qty: 60 TABLET | OUTPATIENT
Start: 2020-06-29

## 2020-07-02 DIAGNOSIS — F41.9 ANXIETY: ICD-10-CM

## 2020-07-02 RX ORDER — ALPRAZOLAM 0.5 MG/1
TABLET ORAL
Qty: 60 TABLET | Refills: 0 | Status: SHIPPED | OUTPATIENT
Start: 2020-07-02 | End: 2020-07-31

## 2020-07-20 DIAGNOSIS — E03.8 SUBCLINICAL HYPOTHYROIDISM: ICD-10-CM

## 2020-07-20 RX ORDER — NYSTATIN AND TRIAMCINOLONE ACETONIDE 100000; 1 [USP'U]/G; MG/G
OINTMENT TOPICAL 2 TIMES DAILY
Qty: 30 G | Refills: 1 | Status: SHIPPED | OUTPATIENT
Start: 2020-07-20 | End: 2021-06-22

## 2020-07-20 RX ORDER — LEVOTHYROXINE SODIUM 0.12 MG/1
TABLET ORAL
Qty: 45 TABLET | Refills: 2 | Status: SHIPPED | OUTPATIENT
Start: 2020-07-20 | End: 2021-04-19

## 2020-07-31 DIAGNOSIS — F41.9 ANXIETY: ICD-10-CM

## 2020-07-31 RX ORDER — ACEBUTOLOL HYDROCHLORIDE 200 MG/1
CAPSULE ORAL
Qty: 30 CAPSULE | Refills: 0 | Status: SHIPPED | OUTPATIENT
Start: 2020-07-31 | End: 2020-08-05 | Stop reason: SDUPTHER

## 2020-07-31 RX ORDER — ALPRAZOLAM 0.5 MG/1
TABLET ORAL
Qty: 60 TABLET | Refills: 0 | Status: SHIPPED | OUTPATIENT
Start: 2020-07-31 | End: 2020-08-31

## 2020-08-05 ENCOUNTER — TELEPHONE (OUTPATIENT)
Dept: FAMILY MEDICINE CLINIC | Facility: CLINIC | Age: 65
End: 2020-08-05

## 2020-08-05 ENCOUNTER — OFFICE VISIT (OUTPATIENT)
Dept: FAMILY MEDICINE CLINIC | Facility: CLINIC | Age: 65
End: 2020-08-05

## 2020-08-05 VITALS
TEMPERATURE: 97.8 F | BODY MASS INDEX: 36.5 KG/M2 | HEART RATE: 68 BPM | OXYGEN SATURATION: 96 % | DIASTOLIC BLOOD PRESSURE: 68 MMHG | SYSTOLIC BLOOD PRESSURE: 140 MMHG | HEIGHT: 64 IN | WEIGHT: 213.8 LBS

## 2020-08-05 DIAGNOSIS — J44.9 CHRONIC OBSTRUCTIVE PULMONARY DISEASE, UNSPECIFIED COPD TYPE (HCC): ICD-10-CM

## 2020-08-05 DIAGNOSIS — J45.20 MILD INTERMITTENT ASTHMA WITHOUT COMPLICATION: ICD-10-CM

## 2020-08-05 DIAGNOSIS — Z12.31 ENCOUNTER FOR SCREENING MAMMOGRAM FOR BREAST CANCER: ICD-10-CM

## 2020-08-05 DIAGNOSIS — F41.1 GENERALIZED ANXIETY DISORDER: ICD-10-CM

## 2020-08-05 DIAGNOSIS — E03.9 HYPOTHYROIDISM (ACQUIRED): ICD-10-CM

## 2020-08-05 DIAGNOSIS — R51.9 CHRONIC DAILY HEADACHE: ICD-10-CM

## 2020-08-05 DIAGNOSIS — E66.01 CLASS 2 SEVERE OBESITY DUE TO EXCESS CALORIES WITH SERIOUS COMORBIDITY AND BODY MASS INDEX (BMI) OF 36.0 TO 36.9 IN ADULT (HCC): ICD-10-CM

## 2020-08-05 DIAGNOSIS — Z79.899 LONG-TERM USE OF HIGH-RISK MEDICATION: ICD-10-CM

## 2020-08-05 DIAGNOSIS — D22.9 CHANGING NEVUS: ICD-10-CM

## 2020-08-05 DIAGNOSIS — Z72.0 TOBACCO ABUSE: ICD-10-CM

## 2020-08-05 DIAGNOSIS — F33.1 MODERATE EPISODE OF RECURRENT MAJOR DEPRESSIVE DISORDER (HCC): ICD-10-CM

## 2020-08-05 DIAGNOSIS — N30.90 CYSTITIS: ICD-10-CM

## 2020-08-05 DIAGNOSIS — M81.0 OSTEOPOROSIS, UNSPECIFIED OSTEOPOROSIS TYPE, UNSPECIFIED PATHOLOGICAL FRACTURE PRESENCE: ICD-10-CM

## 2020-08-05 DIAGNOSIS — E78.5 HYPERLIPIDEMIA, UNSPECIFIED HYPERLIPIDEMIA TYPE: ICD-10-CM

## 2020-08-05 DIAGNOSIS — Z00.00 MEDICARE ANNUAL WELLNESS VISIT, INITIAL: Primary | ICD-10-CM

## 2020-08-05 LAB
25(OH)D3 SERPL-MCNC: 43.1 NG/ML (ref 30–100)
ALBUMIN SERPL-MCNC: 4.5 G/DL (ref 3.5–5.2)
ALBUMIN/GLOB SERPL: 1.9 G/DL
ALP SERPL-CCNC: 152 U/L (ref 39–117)
ALT SERPL W P-5'-P-CCNC: 28 U/L (ref 1–33)
ANION GAP SERPL CALCULATED.3IONS-SCNC: 13.2 MMOL/L (ref 5–15)
AST SERPL-CCNC: 16 U/L (ref 1–32)
BACTERIA UR QL AUTO: ABNORMAL /HPF
BILIRUB SERPL-MCNC: 0.2 MG/DL (ref 0–1.2)
BILIRUB UR QL STRIP: NEGATIVE
BUN SERPL-MCNC: 11 MG/DL (ref 8–23)
BUN/CREAT SERPL: 13.4 (ref 7–25)
CALCIUM SPEC-SCNC: 9.9 MG/DL (ref 8.6–10.5)
CHLORIDE SERPL-SCNC: 101 MMOL/L (ref 98–107)
CHOLEST SERPL-MCNC: 171 MG/DL (ref 0–200)
CLARITY UR: CLEAR
CO2 SERPL-SCNC: 23.8 MMOL/L (ref 22–29)
COLOR UR: YELLOW
CREAT SERPL-MCNC: 0.82 MG/DL (ref 0.57–1)
ERYTHROCYTE [DISTWIDTH] IN BLOOD BY AUTOMATED COUNT: 13.4 % (ref 12.3–15.4)
GFR SERPL CREATININE-BSD FRML MDRD: 70 ML/MIN/1.73
GLOBULIN UR ELPH-MCNC: 2.4 GM/DL
GLUCOSE SERPL-MCNC: 92 MG/DL (ref 65–99)
GLUCOSE UR STRIP-MCNC: NEGATIVE MG/DL
HCT VFR BLD AUTO: 44.6 % (ref 34–46.6)
HDLC SERPL-MCNC: 48 MG/DL (ref 40–60)
HGB BLD-MCNC: 14.4 G/DL (ref 12–15.9)
HGB UR QL STRIP.AUTO: NEGATIVE
KETONES UR QL STRIP: NEGATIVE
LDLC SERPL CALC-MCNC: 106 MG/DL (ref 0–100)
LDLC/HDLC SERPL: 2.2 {RATIO}
LEUKOCYTE ESTERASE UR QL STRIP.AUTO: ABNORMAL
LYMPHOCYTES # BLD AUTO: 2.7 10*3/MM3 (ref 0.7–3.1)
LYMPHOCYTES NFR BLD AUTO: 38.9 % (ref 19.6–45.3)
MCH RBC QN AUTO: 31.4 PG (ref 26.6–33)
MCHC RBC AUTO-ENTMCNC: 32.4 G/DL (ref 31.5–35.7)
MCV RBC AUTO: 96.9 FL (ref 79–97)
MONOCYTES # BLD AUTO: 0.5 10*3/MM3 (ref 0.1–0.9)
MONOCYTES NFR BLD AUTO: 7.1 % (ref 5–12)
NEUTROPHILS NFR BLD AUTO: 3.8 10*3/MM3 (ref 1.7–7)
NEUTROPHILS NFR BLD AUTO: 54 % (ref 42.7–76)
NITRITE UR QL STRIP: POSITIVE
PH UR STRIP.AUTO: 5.5 [PH] (ref 4.6–8)
PLATELET # BLD AUTO: 242 10*3/MM3 (ref 140–450)
PMV BLD AUTO: 7.6 FL (ref 6–12)
POTASSIUM SERPL-SCNC: 4.1 MMOL/L (ref 3.5–5.2)
PROT SERPL-MCNC: 6.9 G/DL (ref 6–8.5)
PROT UR QL STRIP: NEGATIVE
RBC # BLD AUTO: 4.6 10*6/MM3 (ref 3.77–5.28)
RBC # UR: ABNORMAL /HPF
REF LAB TEST METHOD: ABNORMAL
SODIUM SERPL-SCNC: 138 MMOL/L (ref 136–145)
SP GR UR STRIP: 1.02 (ref 1–1.03)
SQUAMOUS #/AREA URNS HPF: ABNORMAL /HPF
TRIGL SERPL-MCNC: 87 MG/DL (ref 0–150)
TSH SERPL DL<=0.05 MIU/L-ACNC: 1.07 UIU/ML (ref 0.27–4.2)
UROBILINOGEN UR QL STRIP: ABNORMAL
VLDLC SERPL-MCNC: 17.4 MG/DL (ref 5–40)
WBC # BLD AUTO: 7 10*3/MM3 (ref 3.4–10.8)
WBC UR QL AUTO: ABNORMAL /HPF

## 2020-08-05 PROCEDURE — 99214 OFFICE O/P EST MOD 30 MIN: CPT | Performed by: NURSE PRACTITIONER

## 2020-08-05 PROCEDURE — 36415 COLL VENOUS BLD VENIPUNCTURE: CPT | Performed by: NURSE PRACTITIONER

## 2020-08-05 PROCEDURE — G0438 PPPS, INITIAL VISIT: HCPCS | Performed by: NURSE PRACTITIONER

## 2020-08-05 PROCEDURE — 80053 COMPREHEN METABOLIC PANEL: CPT | Performed by: NURSE PRACTITIONER

## 2020-08-05 PROCEDURE — 81001 URINALYSIS AUTO W/SCOPE: CPT | Performed by: NURSE PRACTITIONER

## 2020-08-05 PROCEDURE — 84443 ASSAY THYROID STIM HORMONE: CPT | Performed by: NURSE PRACTITIONER

## 2020-08-05 PROCEDURE — 82306 VITAMIN D 25 HYDROXY: CPT | Performed by: NURSE PRACTITIONER

## 2020-08-05 PROCEDURE — 85025 COMPLETE CBC W/AUTO DIFF WBC: CPT | Performed by: NURSE PRACTITIONER

## 2020-08-05 PROCEDURE — 80061 LIPID PANEL: CPT | Performed by: NURSE PRACTITIONER

## 2020-08-05 RX ORDER — ATORVASTATIN CALCIUM 20 MG/1
20 TABLET, FILM COATED ORAL
Qty: 90 TABLET | Refills: 1 | Status: SHIPPED | OUTPATIENT
Start: 2020-08-05 | End: 2020-08-05 | Stop reason: DRUGHIGH

## 2020-08-05 RX ORDER — ACEBUTOLOL HYDROCHLORIDE 200 MG/1
200 CAPSULE ORAL DAILY
Qty: 90 CAPSULE | Refills: 3 | Status: SHIPPED | OUTPATIENT
Start: 2020-08-05 | End: 2021-09-03

## 2020-08-05 RX ORDER — CIPROFLOXACIN 500 MG/1
500 TABLET, FILM COATED ORAL 2 TIMES DAILY
Qty: 14 TABLET | Refills: 0 | Status: SHIPPED | OUTPATIENT
Start: 2020-08-05 | End: 2020-08-12

## 2020-08-05 RX ORDER — ESTRADIOL 0.1 MG/G
CREAM VAGINAL
Qty: 1 EACH | Refills: 11 | Status: SHIPPED | OUTPATIENT
Start: 2020-08-05 | End: 2021-04-02 | Stop reason: SDUPTHER

## 2020-08-05 RX ORDER — LEVALBUTEROL TARTRATE 45 UG/1
1-2 AEROSOL, METERED ORAL EVERY 4 HOURS PRN
Qty: 15 G | Refills: 11 | Status: SHIPPED | OUTPATIENT
Start: 2020-08-05 | End: 2020-09-25

## 2020-08-05 RX ORDER — FOLIC ACID 1 MG/1
1000 TABLET ORAL DAILY
Qty: 90 TABLET | Refills: 1 | Status: SHIPPED | OUTPATIENT
Start: 2020-08-05 | End: 2021-02-26

## 2020-08-05 RX ORDER — ATORVASTATIN CALCIUM 40 MG/1
40 TABLET, FILM COATED ORAL NIGHTLY
Qty: 90 TABLET | Refills: 3 | Status: SHIPPED | OUTPATIENT
Start: 2020-08-05 | End: 2021-08-09

## 2020-08-05 RX ORDER — SUMATRIPTAN 50 MG/1
TABLET, FILM COATED ORAL
Qty: 12 TABLET | Refills: 0 | Status: SHIPPED | OUTPATIENT
Start: 2020-08-05 | End: 2021-05-04

## 2020-08-05 NOTE — PATIENT INSTRUCTIONS
medicare wellness today, check labs and call with results, cont all chronic dz meds and trial sumatriptan prn break migraine defer neuro consult at this time, erx cipro 500 mg BID x 1 wk, Wipe front to back, increase H20 8 glasses day, urinate after intercourse, enc freq toileting, cont topical estrace cream, increase lipitor 40 mg and congratulated on weight loss cont healthy diet and exercise, encourage smoking cessation, order overdue mammogram defer CBE, The patient has read and signed the [facility] Controlled Substance Contract UTD today, UDS today, goyo reviewed. I will continue to see patient for regular follow up appointments.  They are well controlled on their medication.  GOYO is updated every 3 months. The patient is aware of the potential for addiction and dependence.  Advance Care Planning and Advance Directives     You make decisions on a daily basis - decisions about where you want to live, your career, your home, your life. Perhaps one of the most important decisions you face is your choice for future medical care. Take time to talk with your family and your healthcare team and start planning today.  Advance Care Planning is a process that can help you:  · Understand possible future healthcare decisions in light of your own experiences  · Reflect on those decision in light of your goals and values  · Discuss your decisions with those closest to you and the healthcare professionals that care for you  · Make a plan by creating a document that reflects your wishes    Surrogate Decision Maker  In the event of a medical emergency, which has left you unable to communicate or to make your own decisions, you would need someone to make decisions for you.  It is important to discuss your preferences for medical treatment with this person while you are in good health.     Qualities of a surrogate decision maker:  • Willing to take on this role and responsibility  • Knows what you want for future  medical care  • Willing to follow your wishes even if they don't agree with them  • Able to make difficult medical decisions under stressful circumstances    Advance Directives  These are legal documents you can create that will guide your healthcare team and decision maker(s) when needed. These documents can be stored in the electronic medical record.    · Living Will - a legal document to guide your care if you have a terminal condition or a serious illness and are unable to communicate. States vary by statute in document names/types, but most forms may include one or more of the following:        -  Directions regarding life-prolonging treatments        -  Directions regarding artificially provided nutrition/hydration        -  Choosing a healthcare decision maker        -  Direction regarding organ/tissue donation    · Durable Power of  for Healthcare - this document names an -in-fact to make medical decisions for you, but it may also allow this person to make personal and financial decisions for you. Please seek the advice of an  if you need this type of document.    **Advance Directives are not required and no one may discriminate against you if you do not sign one.    Medical Orders  Many states allow specific forms/orders signed by your physician to record your wishes for medical treatment in your current state of health. This form, signed in personal communication with your physician, addresses resuscitation and other medical interventions that you may or may not want.      For more information or to schedule a time with a [facility] Advance Care Planning Facilitator contact: [email or web address] Medicare Wellness  Personal Prevention Plan of Service     Date of Office Visit:  2020  Encounter Provider:  SALVATORE Schwarz  Place of Service:  [facility]  Patient Name: Kaci Mcgee  :  1955    As part of the Medicare  Wellness portion of your visit today, we are providing you with this personalized preventive plan of services (PPPS). This plan is based upon recommendations of the [place] Preventive Services Task Force (USPSTF) and the Advisory Committee on Immunization Practices (ACIP).    This lists the preventive care services that should be considered, and provides dates of when you are due. Items listed as completed are up-to-date and do not require any further intervention.    Health Maintenance   Topic Date Due   • TDAP/TD VACCINES (1 - Tdap) 05/15/1966   • ZOSTER VACCINE (1 of 2) 05/15/2005   • LUNG CANCER SCREENING  05/15/2010   • MEDICARE ANNUAL WELLNESS  01/19/2016   • MAMMOGRAM  07/31/2019   • PAP SMEAR  09/12/2019   • Pneumococcal Vaccine Once at 65 Years Old  05/15/2020   • INFLUENZA VACCINE  08/01/2020   • LIPID PANEL  08/23/2020   • DXA SCAN  11/21/2020   • COLONOSCOPY  06/05/2024   • HEPATITIS C SCREENING  Completed       Orders Placed This Encounter   Procedures   • Mammo Screening Bilateral With CAD     Standing Status:   Future     Standing Expiration Date:   8/5/2021     Order Specific Question:   Reason for Exam:     Answer:   br ca screening   • Comprehensive Metabolic Panel   • Lipid Panel   • TSH   • Vitamin D 25 Hydroxy   • Compliance Drug Analysis, Ur - Urine, Clean Catch   • CBC Auto Differential   • Urinalysis without microscopic (no culture) - Urine, Clean Catch   • Urinalysis, Microscopic Only - Urine, Clean Catch   • CBC & Differential     Order Specific Question:   Manual Differential     Answer:   No   • Urinalysis With Microscopic - Urine, Clean Catch       No follow-ups on file.      m/ACP or call [phone] and someone will contact you directly.

## 2020-08-05 NOTE — PROGRESS NOTES
Subjective   Kaci Mcgee is a 65 y.o. female.     History of Present Illness   Here to FU on chronic migraines on fioricet worse with pressure changes and rain recently, gets #60 a month, prev neuro consult, prev amitriptyline and topamax no help, with recently bad HA hasn't tried sumatriptan in a while unsure if helped but request trial again  With chronic chol on atorvastatin 20 mg fasting for labs last checked 08/19 LDL 80  With chronic angina prev saw Dr Bassett cardiology last stress test 11/17 on acebutolol 200 mg sx stable no CP dizziness HA LE edema  S/p full hyst for endometriosis last mammo 07/18 no breast lumps, nipple dc or pain, has seen GYN Dr Washington 07/31/18 on estrace cream, has seen Dr Granda urology prn chronic UTI with urin odor no pain or freq or urgency, no blood or vag sx, last dexa 11/18 osteoporosis was on forteo but caused leg pain, s/o wrist fracture last year 09/19 from fall at home  With chronic depression and anxiety on fluoxetine 60 mg and xanax 1 mg BID x many years mother passed from ALS and was her primary caregiver > 20 years, daughter and grandson also with panic, prev seen counseling doesn't think needs at this time, lives with  and some increased stress with pandemic  With chronic hypothyroid on levothyroxine 125 mcg daily 1/2 PO daily has seen endo Dr Peralta last TSH 06/19 nl  With DARRION on CPAP sx stable sees Dr Trujillo last saw 06/20 FU annually  With chronic asthma and CVID immune deficiency prev infusions caused flu like symptoms on advair and singulair 10 mg and has albuterol rarely and nebulizer fall and winter triggers, no SOA wheezing or coughing  Last colonoscopy 06/19 polyp removed FU with Dr Mooney 5 years, with hx of high alk phos has already seen Dr Barakat GI attributes to CVID  Hx of neck sgy and had DVT in past, recently saw ortho Dr Randolph pushing L knee replacement sgy as long as possible using pennsaid and just had steroid injection, Sees   [facility] Rhe[facility] dx fibromyalgia and monitoring to see if has lupus with hx of positive SIRI  States has changing mole on R upper thigh has seen Dr Baljit Pisano derm will schedule overdue FU    The following portions of the patient's history were reviewed and updated as appropriate: allergies, current medications, past family history, past medical history, past social history, past surgical history and problem list.    Review of Systems   Constitutional: Positive for fatigue. Negative for fever.   HENT: Negative for trouble swallowing and voice change.    Respiratory: Negative for cough, shortness of breath and wheezing.    Cardiovascular: Negative for chest pain, palpitations and leg swelling.   Gastrointestinal: Negative for abdominal distention, abdominal pain, anal bleeding, blood in stool, constipation, diarrhea, nausea, rectal pain and vomiting.   Endocrine: Negative for cold intolerance, heat intolerance, polydipsia, polyphagia and polyuria.   Genitourinary: Negative for decreased urine volume, difficulty urinating, dyspareunia, dysuria (but odor), enuresis, flank pain, frequency, genital sores, hematuria, menstrual problem, pelvic pain, urgency, vaginal bleeding, vaginal discharge and vaginal pain.   Musculoskeletal: Positive for arthralgias (knee), gait problem and myalgias. Negative for back pain, joint swelling, neck pain and neck stiffness.   Skin: Positive for color change.   Neurological: Positive for headaches. Negative for dizziness.   Psychiatric/Behavioral: Positive for dysphoric mood. Negative for agitation, behavioral problems, confusion, decreased concentration, hallucinations, self-injury, sleep disturbance and suicidal ideas. The patient is nervous/anxious. The patient is not hyperactive.    All other systems reviewed and are negative.      Objective   Physical Exam   Constitutional: She is oriented to person, place, and time. She appears well-developed and well-nourished.   HENT:   Head:  Normocephalic and atraumatic.   Eyes: Pupils are equal, round, and reactive to light. Conjunctivae and EOM are normal.   Neck: Normal range of motion. Neck supple. No thyromegaly present.   Cardiovascular: Normal rate, regular rhythm and normal heart sounds.   Pulmonary/Chest: Effort normal and breath sounds normal.   Abdominal: Soft. She exhibits no distension and no mass. There is no tenderness. There is no rebound, no guarding and no CVA tenderness. No hernia.   Musculoskeletal: Normal range of motion. She exhibits no edema (B LE).   Lymphadenopathy:     She has no cervical adenopathy.   Neurological: She is alert and oriented to person, place, and time.   Skin: Skin is warm and dry.   Psychiatric: She has a normal mood and affect. Her behavior is normal. Judgment and thought content normal.   Vitals reviewed.      Assessment/Plan   Kaci was seen today for medicare wellness-initial visit and hyperlipidemia.    Diagnoses and all orders for this visit:    Medicare annual wellness visit, initial    Chronic daily headache  -     acebutolol (SECTRAL) 200 MG capsule; Take 1 capsule by mouth Daily.  -     CBC & Differential  -     Comprehensive Metabolic Panel  -     Compliance Drug Analysis, Ur - Urine, Clean Catch  -     CBC Auto Differential    Hyperlipidemia, unspecified hyperlipidemia type  -     Discontinue: atorvastatin (LIPITOR) 20 MG tablet; Take 1 tablet by mouth every night at bedtime.  -     CBC & Differential  -     Comprehensive Metabolic Panel  -     Lipid Panel  -     CBC Auto Differential    Hypothyroidism (acquired)  -     CBC & Differential  -     Comprehensive Metabolic Panel  -     Lipid Panel  -     TSH  -     CBC Auto Differential    Chronic obstructive pulmonary disease, unspecified COPD type (CMS/HCC)  -     folic acid (FOLVITE) 1 MG tablet; Take 1 tablet by mouth Daily.    Mild intermittent asthma without complication  -     levalbuterol (XOPENEX HFA) 45 MCG/ACT inhaler; Inhale 1-2 puffs Every 4  (Four) Hours As Needed for Wheezing or Shortness of Air.    Encounter for screening mammogram for breast cancer  -     Mammo Screening Bilateral With CAD; Future    Changing nevus  -     Cancel: Ambulatory Referral to Dermatology    Tobacco abuse  -     CBC & Differential  -     Comprehensive Metabolic Panel  -     CBC Auto Differential    Moderate episode of recurrent major depressive disorder (CMS/HCC)  -     CBC & Differential  -     Comprehensive Metabolic Panel  -     Lipid Panel  -     TSH  -     Urinalysis With Microscopic - Urine, Clean Catch  -     CBC Auto Differential  -     Urinalysis without microscopic (no culture) - Urine, Clean Catch  -     Urinalysis, Microscopic Only - Urine, Clean Catch    Generalized anxiety disorder  -     Compliance Drug Analysis, Ur - Urine, Clean Catch    Long-term use of high-risk medication  -     Compliance Drug Analysis, Ur - Urine, Clean Catch    Osteoporosis, unspecified osteoporosis type, unspecified pathological fracture presence  -     Vitamin D 25 Hydroxy    Class 2 severe obesity due to excess calories with serious comorbidity and body mass index (BMI) of 36.0 to 36.9 in adult (CMS/[facility])    Cystitis    Other orders  -     ESTRACE VAGINAL 0.1 MG/GM vaginal cream; Insert intravaginally twice weekly HS  -     SUMAtriptan (Imitrex) 50 MG tablet; Take one tablet at onset of headache. May repeat dose one time in 2 hours if headache not relieved.  -     ciprofloxacin (Cipro) 500 MG tablet; Take 1 tablet by mouth 2 (Two) Times a Day for 7 days.  -     atorvastatin (Lipitor) 40 MG tablet; Take 1 tablet by mouth Every Night.    medicare wellness today, check labs and call with results, cont all chronic dz meds and trial sumatriptan prn break migraine defer neuro consult at this time, erx cipro 500 mg BID x 1 wk, Wipe front to back, increase H20 8 glasses day, urinate after intercourse, enc freq toileting, cont topical estrace cream, increase lipitor 40 mg and congratulated on  weight loss cont healthy diet and exercise, encourage smoking cessation, order overdue mammogram defer CBE, The patient has read and signed the [facility] Controlled Substance Contract UTD today, UDS today, goyo reviewed. I will continue to see patient for regular follow up appointments.  They are well controlled on their medication.  GOYO is updated every 3 months. The patient is aware of the potential for addiction and dependence.

## 2020-08-05 NOTE — PROGRESS NOTES
The ABCs of the Annual Wellness Visit  Initial Medicare Wellness Visit    Chief Complaint   Patient presents with   • Medicare Wellness-Initial Visit   • Hyperlipidemia     Subjective   History of Present Illness:  Kaic Mcgee is a 65 y.o. female who presents for an Initial Medicare Wellness Visit.    HEALTH RISK ASSESSMENT    Recent Hospitalizations:  Recently treated at the following:  [facility] 09/19 wrist fracture    Current Medical Providers:  Patient Care Team:  Elaine Fernandez APRN as PCP - General (Family Medicine)  Giorgi Mcclellan MD as Consulting Physician (Endocrinology)  Ameya Granda MD as Consulting Physician (Urology)  Giovana Mooney MD as Surgeon (General Surgery)  Hector Trujillo MD as Consulting Physician (Sleep Medicine)  Odilia Domínguez MD as Consulting Physician (Hematology and Oncology)  Sanjeev Bassett MD as Consulting Physician (Cardiology)  ORTIZ Franco MD as Consulting Physician (Ophthalmology)  Wallace Randolph MD (Family Medicine)  Sees Dr Smiley Vinton dermatology    Smoking Status:  Social History     Tobacco Use   Smoking Status Current Every Day Smoker   • Packs/day: 1.00   • Years: 30.00   • Pack years: 30.00   • Types: Cigarettes   Smokeless Tobacco Never Used     Alcohol Consumption:  Social History     Substance and Sexual Activity   Alcohol Use No     Depression Screen:   PHQ-2/PHQ-9 Depression Screening 8/5/2020   Little interest or pleasure in doing things 1   Feeling down, depressed, or hopeless 1   Trouble falling or staying asleep, or sleeping too much 0   Feeling tired or having little energy 3   Poor appetite or overeating 0   Feeling bad about yourself - or that you are a failure or have let yourself or your family down 0   Trouble concentrating on things, such as reading the newspaper or watching television 0   Moving or speaking so slowly that other people could have noticed. Or the opposite - being so fidgety or  restless that you have been moving around a lot more than usual 0   Thoughts that you would be better off dead, or of hurting yourself in some way 0   Total Score 5   If you checked off any problems, how difficult have these problems made it for you to do your work, take care of things at home, or get along with other people? Not difficult at all     Fall Risk Screen:  KIMBERLY Fall Risk Assessment was completed, and patient is at HIGH risk for falls. Assessment completed on:8/5/2020    Health Habits and Functional and Cognitive Screening:  Functional & Cognitive Status 8/5/2020   Do you have difficulty preparing food and eating? No   Do you have difficulty bathing yourself, getting dressed or grooming yourself? No   Do you have difficulty using the toilet? No   Do you have difficulty moving around from place to place? No   Do you have trouble with steps or getting out of a bed or a chair? No   Current Diet Well Balanced Diet   Dental Exam Up to date   Eye Exam Up to date   Exercise (times per week) 7 times per week   Current Exercise Activities Include Walking   Do you need help using the phone?  No   Are you deaf or do you have serious difficulty hearing?  No   Do you need help with transportation? No   Do you need help shopping? No   Do you need help preparing meals?  No   Do you need help with housework?  No   Do you need help with laundry? No   Do you need help taking your medications? No   Do you need help managing money? No   Do you ever drive or ride in a car without wearing a seat belt? No   Have you felt unusual stress, anger or loneliness in the last month? No   Who do you live with? Spouse   If you need help, do you have trouble finding someone available to you? No   Have you been bothered in the last four weeks by sexual problems? No   Do you have difficulty concentrating, remembering or making decisions? No     Does the patient have evidence of cognitive impairment? No    Asprin use counseling:Taking ASA  appropriately as indicated    Age-appropriate Screening Schedule:  Refer to the list below for future screening recommendations based on patient's age, sex and/or medical conditions. Orders for these recommended tests are listed in the plan section. The patient has been provided with a written plan.    Health Maintenance   Topic Date Due   • TDAP/TD VACCINES (1 - Tdap) 05/15/1966   • ZOSTER VACCINE (1 of 2) 05/15/2005   • MAMMOGRAM  07/31/2019   • PAP SMEAR  09/12/2019   • INFLUENZA VACCINE  08/01/2020   • LIPID PANEL  08/23/2020   • DXA SCAN  11/21/2020   • COLONOSCOPY  06/05/2024          The following portions of the patient's history were reviewed and updated as appropriate: allergies, current medications, past family history, past medical history, past social history, past surgical history and problem list.    Outpatient Medications Prior to Visit   Medication Sig Dispense Refill   • ALPRAZolam (XANAX) 0.5 MG tablet TAKE 1 TABLET BY MOUTH TWICE DAILY AS NEEDED FOR ANXIETY 60 tablet 0   • aspirin 81 MG tablet Chew 81 mg Daily.     • butalbital-acetaminophen-caffeine (FIORICET, ESGIC) -40 MG per tablet TAKE 1 TABLET BY MOUTH EVERY 6 HOURS AS NEEDED FOR HEADACHE 60 tablet 5   • cetirizine (ZYRTEC ALLERGY) 10 MG tablet Take 10 mg by mouth daily.     • Cholecalciferol (VITAMIN D3) 2000 UNITS tablet Take 2,000 Units by mouth Daily.     • FLUoxetine (PROzac) 60 MG tablet TAKE 1 TABLET BY MOUTH ONCE DAILY 90 tablet 3   • fluticasone-salmeterol (Advair Diskus) 500-50 MCG/DOSE DISKUS Inhale 1 puff 2 (Two) Times a Day. 60 each 11   • levothyroxine (SYNTHROID, LEVOTHROID) 125 MCG tablet TAKE 1/2 TABLET BY MOUTH ONCE DAILY 45 tablet 2   • montelukast (SINGULAIR) 10 MG tablet TAKE 1 TABLET BY MOUTH ONCE DAILY 90 tablet 3   • Multiple Vitamins-Minerals (STRESS TAB NF PO) Take 1 tablet by mouth Daily. vitamin      • nystatin-triamcinolone (MYCOLOG) 367577-7.1 UNIT/GM-% ointment Apply  topically to the appropriate area as  directed 2 (Two) Times a Day. 30 g 1   • acebutolol (SECTRAL) 200 MG capsule Take 1 capsule by mouth Daily. 30 capsule 6   • atorvastatin (LIPITOR) 20 MG tablet TAKE 1 TABLET BY MOUTH AT BEDTIME 90 tablet 1   • ESTRACE VAGINAL 0.1 MG/GM vaginal cream Insert intravaginally twice weekly HS 1 each 11   • folic acid (FOLVITE) 1 MG tablet TAKE 1 TABLET BY MOUTH ONCE DAILY 90 tablet 1   • levalbuterol (XOPENEX HFA) 45 MCG/ACT inhaler Inhale 1-2 puffs Every 4 (Four) Hours As Needed for Wheezing or Shortness of Air. 15 g 11   • Omega-3 Fatty Acids (FISH OIL PO) Take 2 capsules by mouth Daily.     • acebutolol (SECTRAL) 200 MG capsule take 1 capsule by mouth once daily 30 capsule 0   • acebutolol (SECTRAL) 200 MG capsule TAKE 1 CAPSULE BY MOUTH EVERY DAY 30 capsule 0   • fluconazole (DIFLUCAN) 150 MG tablet TAKE 1 TABLET BY MOUTH 1 TIME FOR 1 DOSE 1 tablet 0   • guaiFENesin-codeine (CHERATUSSIN AC) 100-10 MG/5ML liquid Take 5 mL by mouth 4 (Four) Times a Day As Needed for Cough. 120 mL 0   • methylPREDNISolone (MEDROL, DERRICK,) 4 MG tablet Take as directed on package instructions. 21 tablet 0     No facility-administered medications prior to visit.        Patient Active Problem List   Diagnosis   • Atopic rhinitis   • Migraine without aura and without status migrainosus, not intractable   • Common variable agammaglobulinemia (CMS/HCC)   • Moderate episode of recurrent major depressive disorder (CMS/HCC)   • Fibromyalgia   • Hyperlipidemia   • Multinodular goiter   • Osteoarthritis   • Chronic mixed headache syndrome   • Impaired fasting glucose   • Abdominal aortic atherosclerosis (CMS/HCC)   • Cigarette nicotine dependence with nicotine-induced disorder   • Abnormal weight gain   • DARRION on autoCPAP   • MVP (mitral valve prolapse)   • Mild intermittent asthma without complication   • Anxiety and depression   • Depression   • CVID (common variable immunodeficiency) (CMS/HCC)   • Hypothyroidism (acquired)   • Fatty liver   •  "Postmenopausal osteoporosis   • History of pulmonary embolism   • Tobacco abuse   • SIRI positive   • Cutaneous lupus erythematosus   • Prediabetes   • DVT (deep venous thrombosis) (CMS/[facility])   • Colon cancer screening   • Elevated alkaline phosphatase level   • COPD (chronic obstructive pulmonary disease) (CMS/[facility])   • DJD (degenerative joint disease), lumbar   • Sciatica   • Sleep-related hypoxemia   • Class 2 severe obesity due to excess calories with serious comorbidity and body mass index (BMI) of 36.0 to 36.9 in adult (CMS/[facility])       Advanced Care Planning:  ACP discussion was held with the patient during this visit. Patient does not have an advance directive, information provided.    Review of Systems    Compared to one year ago, the patient feels her physical health is worse.  Compared to one year ago, the patient feels her mental health is the same.    Reviewed chart for potential of high risk medication in the elderly: yes  Reviewed chart for potential of harmful drug interactions in the elderly:yes    Objective         Vitals:    08/05/20 0759   BP: 140/68   Pulse: 68   Temp: 97.8 °F (36.6 °C)   SpO2: 96%   Weight: 97 kg (213 lb 12.8 oz)   Height: 162.6 cm (64\")       Body mass index is 36.7 kg/m².  Discussed the patient's BMI with her. The BMI is above average; BMI management plan is completed.    Physical Exam    Lab Results   Component Value Date    TRIG 87 08/05/2020    HDL 48 08/05/2020     (H) 08/05/2020    VLDL 17.4 08/05/2020        Assessment/Plan   Medicare Risks and Personalized Health Plan  CMS Preventative Services Quick Reference  Advance Directive Discussion  Breast Cancer/Mammogram Screening  Cardiovascular risk  Depression/Dysphoria  Fall Risk  Obesity/Overweight   Osteoprorosis Risk  Tobacco Use/Dependance (use dotphrase .tobaccocessation for documentation)    The above risks/problems have been discussed with the patient.  Pertinent information has been shared with the patient in " the After Visit Summary.  Follow up plans and orders are seen below in the Assessment/Plan Section.    Diagnoses and all orders for this visit:    1. Medicare annual wellness visit, initial (Primary)    2. Chronic daily headache  -     acebutolol (SECTRAL) 200 MG capsule; Take 1 capsule by mouth Daily.  Dispense: 90 capsule; Refill: 3  -     CBC & Differential  -     Comprehensive Metabolic Panel  -     Compliance Drug Analysis, Ur - Urine, Clean Catch  -     CBC Auto Differential    3. Hyperlipidemia, unspecified hyperlipidemia type  -     Discontinue: atorvastatin (LIPITOR) 20 MG tablet; Take 1 tablet by mouth every night at bedtime.  Dispense: 90 tablet; Refill: 1  -     CBC & Differential  -     Comprehensive Metabolic Panel  -     Lipid Panel  -     CBC Auto Differential    4. Hypothyroidism (acquired)  -     CBC & Differential  -     Comprehensive Metabolic Panel  -     Lipid Panel  -     TSH  -     CBC Auto Differential    5. Chronic obstructive pulmonary disease, unspecified COPD type (CMS/HCC)  -     folic acid (FOLVITE) 1 MG tablet; Take 1 tablet by mouth Daily.  Dispense: 90 tablet; Refill: 1    6. Mild intermittent asthma without complication  -     levalbuterol (XOPENEX HFA) 45 MCG/ACT inhaler; Inhale 1-2 puffs Every 4 (Four) Hours As Needed for Wheezing or Shortness of Air.  Dispense: 15 g; Refill: 11    7. Encounter for screening mammogram for breast cancer  -     Mammo Screening Bilateral With CAD; Future    8. Changing nevus  -     Cancel: Ambulatory Referral to Dermatology    9. Tobacco abuse  -     CBC & Differential  -     Comprehensive Metabolic Panel  -     CBC Auto Differential    10. Moderate episode of recurrent major depressive disorder (CMS/HCC)  -     CBC & Differential  -     Comprehensive Metabolic Panel  -     Lipid Panel  -     TSH  -     Urinalysis With Microscopic - Urine, Clean Catch  -     CBC Auto Differential  -     Urinalysis without microscopic (no culture) - Urine, Clean  Catch  -     Urinalysis, Microscopic Only - Urine, Clean Catch    11. Generalized anxiety disorder  -     Compliance Drug Analysis, Ur - Urine, Clean Catch    12. Long-term use of high-risk medication  -     Compliance Drug Analysis, Ur - Urine, Clean Catch    13. Osteoporosis, unspecified osteoporosis type, unspecified pathological fracture presence  -     Vitamin D 25 Hydroxy    14. Class 2 severe obesity due to excess calories with serious comorbidity and body mass index (BMI) of 36.0 to 36.9 in adult (CMS/[facility])    15. Cystitis    Other orders  -     ESTRACE VAGINAL 0.1 MG/GM vaginal cream; Insert intravaginally twice weekly HS  Dispense: 1 each; Refill: 11  -     SUMAtriptan (Imitrex) 50 MG tablet; Take one tablet at onset of headache. May repeat dose one time in 2 hours if headache not relieved.  Dispense: 12 tablet; Refill: 0  -     ciprofloxacin (Cipro) 500 MG tablet; Take 1 tablet by mouth 2 (Two) Times a Day for 7 days.  Dispense: 14 tablet; Refill: 0  -     atorvastatin (Lipitor) 40 MG tablet; Take 1 tablet by mouth Every Night.  Dispense: 90 tablet; Refill: 3      Follow Up:  Return in about 6 months (around 2/5/2021).     An After Visit Summary and PPPS were given to the patient.

## 2020-08-05 NOTE — TELEPHONE ENCOUNTER
Spoke with patient has urin odor erx abx to pharmacy and gave rest of her lab results increased lipitor

## 2020-08-09 LAB — CONV REPORT SUMMARY: NORMAL

## 2020-08-31 DIAGNOSIS — F41.9 ANXIETY: ICD-10-CM

## 2020-08-31 RX ORDER — ALPRAZOLAM 0.5 MG/1
TABLET ORAL
Qty: 60 TABLET | Refills: 0 | Status: SHIPPED | OUTPATIENT
Start: 2020-08-31 | End: 2020-09-25

## 2020-09-16 ENCOUNTER — TELEPHONE (OUTPATIENT)
Dept: FAMILY MEDICINE CLINIC | Facility: CLINIC | Age: 65
End: 2020-09-16

## 2020-09-16 RX ORDER — IPRATROPIUM BROMIDE AND ALBUTEROL SULFATE 2.5; .5 MG/3ML; MG/3ML
3 SOLUTION RESPIRATORY (INHALATION) EVERY 4 HOURS PRN
Qty: 120 ML | Refills: 2 | Status: SHIPPED | OUTPATIENT
Start: 2020-09-16 | End: 2022-06-18

## 2020-09-16 NOTE — TELEPHONE ENCOUNTER
ОЛЬГА REPORTING PROBLEM WITH MEDICATION:   ipratropium-albuterol (DUO-NEB) 0.5-2.5 mg/3 ml nebulizer [76596]    ISSUE:  NEED ICD 10 CODE FOR MEDICARE.      THEY ONLY COME IN 90 ML AND IN BOXES OF 30 VILES,    PLEASE ADVISE    PHARMACY CALL BACK: [phone]

## 2020-09-25 DIAGNOSIS — J45.20 MILD INTERMITTENT ASTHMA WITHOUT COMPLICATION: ICD-10-CM

## 2020-09-25 DIAGNOSIS — G43.819 OTHER MIGRAINE WITHOUT STATUS MIGRAINOSUS, INTRACTABLE: ICD-10-CM

## 2020-09-25 DIAGNOSIS — F41.9 ANXIETY: ICD-10-CM

## 2020-09-25 RX ORDER — BUTALBITAL, ACETAMINOPHEN AND CAFFEINE 50; 325; 40 MG/1; MG/1; MG/1
1 TABLET ORAL EVERY 6 HOURS PRN
Qty: 60 TABLET | Refills: 5 | Status: SHIPPED | OUTPATIENT
Start: 2020-09-25 | End: 2021-03-10

## 2020-09-25 RX ORDER — ALPRAZOLAM 0.5 MG/1
TABLET ORAL
Qty: 60 TABLET | Refills: 0 | Status: SHIPPED | OUTPATIENT
Start: 2020-09-25 | End: 2020-10-27

## 2020-09-25 RX ORDER — LEVALBUTEROL TARTRATE 45 UG/1
AEROSOL, METERED ORAL
Qty: 15 G | Refills: 11 | Status: SHIPPED | OUTPATIENT
Start: 2020-09-25

## 2020-09-30 DIAGNOSIS — Z83.49 FAMILY HISTORY OF HEMOCHROMATOSIS: Primary | ICD-10-CM

## 2020-10-07 ENCOUNTER — OFFICE VISIT (OUTPATIENT)
Dept: FAMILY MEDICINE CLINIC | Facility: CLINIC | Age: 65
End: 2020-10-07

## 2020-10-07 VITALS
HEART RATE: 71 BPM | BODY MASS INDEX: 36.37 KG/M2 | WEIGHT: 213 LBS | SYSTOLIC BLOOD PRESSURE: 140 MMHG | HEIGHT: 64 IN | OXYGEN SATURATION: 97 % | TEMPERATURE: 97.5 F | DIASTOLIC BLOOD PRESSURE: 60 MMHG

## 2020-10-07 DIAGNOSIS — Z83.49 FAMILY HISTORY OF HEMOCHROMATOSIS: ICD-10-CM

## 2020-10-07 DIAGNOSIS — Z23 NEED FOR INFLUENZA VACCINATION: ICD-10-CM

## 2020-10-07 DIAGNOSIS — Z23 NEED FOR PNEUMOCOCCAL VACCINATION: ICD-10-CM

## 2020-10-07 DIAGNOSIS — N30.90 CYSTITIS: Primary | ICD-10-CM

## 2020-10-07 LAB
BACTERIA UR QL AUTO: ABNORMAL /HPF
BILIRUB UR QL STRIP: NEGATIVE
CLARITY UR: CLEAR
COLOR UR: YELLOW
ERYTHROCYTE [DISTWIDTH] IN BLOOD BY AUTOMATED COUNT: 12.5 % (ref 12.3–15.4)
FERRITIN SERPL-MCNC: 80.3 NG/ML (ref 13–150)
GLUCOSE UR STRIP-MCNC: NEGATIVE MG/DL
HCT VFR BLD AUTO: 44.9 % (ref 34–46.6)
HGB BLD-MCNC: 15.3 G/DL (ref 12–15.9)
HGB UR QL STRIP.AUTO: NEGATIVE
IRON 24H UR-MRATE: 163 MCG/DL (ref 37–145)
IRON SATN MFR SERPL: 37 % (ref 20–50)
KETONES UR QL STRIP: ABNORMAL
LEUKOCYTE ESTERASE UR QL STRIP.AUTO: ABNORMAL
LYMPHOCYTES # BLD AUTO: 3.3 10*3/MM3 (ref 0.7–3.1)
LYMPHOCYTES NFR BLD AUTO: 30.9 % (ref 19.6–45.3)
MCH RBC QN AUTO: 32.5 PG (ref 26.6–33)
MCHC RBC AUTO-ENTMCNC: 34.1 G/DL (ref 31.5–35.7)
MCV RBC AUTO: 95.2 FL (ref 79–97)
MONOCYTES # BLD AUTO: 0.7 10*3/MM3 (ref 0.1–0.9)
MONOCYTES NFR BLD AUTO: 6.5 % (ref 5–12)
NEUTROPHILS NFR BLD AUTO: 6.8 10*3/MM3 (ref 1.7–7)
NEUTROPHILS NFR BLD AUTO: 62.6 % (ref 42.7–76)
NITRITE UR QL STRIP: NEGATIVE
PH UR STRIP.AUTO: 5.5 [PH] (ref 4.6–8)
PLATELET # BLD AUTO: 263 10*3/MM3 (ref 140–450)
PMV BLD AUTO: 7.4 FL (ref 6–12)
PROT UR QL STRIP: NEGATIVE
RBC # BLD AUTO: 4.72 10*6/MM3 (ref 3.77–5.28)
RBC # UR: ABNORMAL /HPF
REF LAB TEST METHOD: ABNORMAL
SP GR UR STRIP: 1.02 (ref 1–1.03)
SQUAMOUS #/AREA URNS HPF: ABNORMAL /HPF
TIBC SERPL-MCNC: 435 MCG/DL (ref 298–536)
TRANSFERRIN SERPL-MCNC: 292 MG/DL (ref 200–360)
UROBILINOGEN UR QL STRIP: ABNORMAL
WBC # BLD AUTO: 10.8 10*3/MM3 (ref 3.4–10.8)
WBC UR QL AUTO: ABNORMAL /HPF

## 2020-10-07 PROCEDURE — 90694 VACC AIIV4 NO PRSRV 0.5ML IM: CPT | Performed by: NURSE PRACTITIONER

## 2020-10-07 PROCEDURE — 81001 URINALYSIS AUTO W/SCOPE: CPT | Performed by: NURSE PRACTITIONER

## 2020-10-07 PROCEDURE — 82728 ASSAY OF FERRITIN: CPT | Performed by: NURSE PRACTITIONER

## 2020-10-07 PROCEDURE — 99213 OFFICE O/P EST LOW 20 MIN: CPT | Performed by: NURSE PRACTITIONER

## 2020-10-07 PROCEDURE — 85025 COMPLETE CBC W/AUTO DIFF WBC: CPT | Performed by: NURSE PRACTITIONER

## 2020-10-07 PROCEDURE — G0009 ADMIN PNEUMOCOCCAL VACCINE: HCPCS | Performed by: NURSE PRACTITIONER

## 2020-10-07 PROCEDURE — 84466 ASSAY OF TRANSFERRIN: CPT | Performed by: NURSE PRACTITIONER

## 2020-10-07 PROCEDURE — G0008 ADMIN INFLUENZA VIRUS VAC: HCPCS | Performed by: NURSE PRACTITIONER

## 2020-10-07 PROCEDURE — 83540 ASSAY OF IRON: CPT | Performed by: NURSE PRACTITIONER

## 2020-10-07 PROCEDURE — 36415 COLL VENOUS BLD VENIPUNCTURE: CPT | Performed by: NURSE PRACTITIONER

## 2020-10-07 PROCEDURE — 90732 PPSV23 VACC 2 YRS+ SUBQ/IM: CPT | Performed by: NURSE PRACTITIONER

## 2020-10-07 RX ORDER — CIPROFLOXACIN 500 MG/1
500 TABLET, FILM COATED ORAL 2 TIMES DAILY
Qty: 14 TABLET | Refills: 0 | Status: SHIPPED | OUTPATIENT
Start: 2020-10-07 | End: 2021-06-23

## 2020-10-07 NOTE — PATIENT INSTRUCTIONS
UA today erx cipro 500 mg BID x 1 wk, Wipe front to back, increase H20 8 glasses day, enc freq toileting, call or RTC if sx persist or worsen, check labs and call with results, flu and pna vaccine update today

## 2020-10-07 NOTE — PROGRESS NOTES
"Ida Mcgee is a 65 y.o. female.     History of Present Illness   C/o RLQ abd pain radiating to R flank x few days and notes pain 5/10 \"ache\" concern possible UTI, increased urin freq, no burning or blood, , no new sexual partners, wiping front to back, not drinking much H20, recently got L knee injection from ortho Dr Smart and notes had to rock to get out of bed wondering if caused muscle strain, prev advil no help, with hx of chronic UTI and hx of 2 urethra tubes and using estrace cream twice weekly, s/p complete hyst Dr [place] G[facility] for endometriosis, last tx UTI 08/20 cipro 500 mg BID and helped, with fam hx of hemachromatosis request update labs today    The following portions of the patient's history were reviewed and updated as appropriate: allergies, current medications, past family history, past medical history, past social history, past surgical history and problem list.    Review of Systems   Constitutional: Negative for fever.   Respiratory: Negative for cough, shortness of breath and wheezing.    Cardiovascular: Negative for chest pain, palpitations and leg swelling.   Genitourinary: Positive for flank pain and frequency. Negative for decreased urine volume, difficulty urinating, dyspareunia, dysuria, enuresis, genital sores, hematuria, menstrual problem, pelvic pain, urgency, vaginal bleeding, vaginal discharge and vaginal pain.   Musculoskeletal: Positive for arthralgias and myalgias.   Neurological: Negative for dizziness and headaches.   All other systems reviewed and are negative.      Objective   Physical Exam  Vitals signs reviewed.   Constitutional:       Appearance: She is well-developed.   HENT:      Head: Normocephalic and atraumatic.   Eyes:      Conjunctiva/sclera: Conjunctivae normal.      Pupils: Pupils are equal, round, and reactive to light.   Cardiovascular:      Rate and Rhythm: Normal rate and regular rhythm.      Heart sounds: Normal heart sounds.   Pulmonary: "      Effort: Pulmonary effort is normal.      Breath sounds: Normal breath sounds.   Abdominal:      General: There is no distension.      Palpations: Abdomen is soft. There is no mass.      Tenderness: There is abdominal tenderness in the right lower quadrant. There is right CVA tenderness. There is no left CVA tenderness, guarding or rebound.      Hernia: No hernia is present.   Musculoskeletal: Normal range of motion.   Skin:     General: Skin is warm and dry.   Neurological:      Mental Status: She is alert and oriented to person, place, and time.   Psychiatric:         Mood and Affect: Mood normal.         Behavior: Behavior normal.         Thought Content: Thought content normal.         Judgment: Judgment normal.         Assessment/Plan   Kaci was seen today for flank pain.    Diagnoses and all orders for this visit:    Cystitis  -     Urinalysis With Microscopic - Urine, Clean Catch  -     Urinalysis without microscopic (no culture) - Urine, Clean Catch  -     Urinalysis, Microscopic Only - Urine, Clean Catch  -     Urinalysis With Microscopic - Urine, Clean Catch; Future    Family history of hemochromatosis  -     CBC & Differential  -     Iron Profile  -     Ferritin  -     CBC Auto Differential    Need for influenza vaccination    Need for pneumococcal vaccination    Other orders  -     ciprofloxacin (Cipro) 500 MG tablet; Take 1 tablet by mouth 2 (Two) Times a Day.  -     Fluad Quad 65+ yrs (0568-6950)  -     Pneumococcal Polysaccharide Vaccine 23-Valent (PPSV23) Greater Than or Equal To 1yo Subcutaneous / IM    UA today erx cipro 500 mg BID x 1 wk, Wipe front to back, increase H20 8 glasses day, enc freq toileting, call or RTC if sx persist or worsen, check labs and call with results, flu and pna vaccine update today

## 2020-10-13 DIAGNOSIS — Z83.49 FAMILY HISTORY OF HEMOCHROMATOSIS: Primary | ICD-10-CM

## 2020-10-21 ENCOUNTER — LAB (OUTPATIENT)
Dept: FAMILY MEDICINE CLINIC | Facility: CLINIC | Age: 65
End: 2020-10-21

## 2020-10-21 DIAGNOSIS — Z83.49 FAMILY HISTORY OF HEMOCHROMATOSIS: ICD-10-CM

## 2020-10-21 DIAGNOSIS — N30.90 CYSTITIS: ICD-10-CM

## 2020-10-21 LAB
BACTERIA UR QL AUTO: NORMAL /HPF
BILIRUB UR QL STRIP: NEGATIVE
CLARITY UR: CLEAR
COLOR UR: YELLOW
GLUCOSE UR STRIP-MCNC: NEGATIVE MG/DL
HGB UR QL STRIP.AUTO: NEGATIVE
KETONES UR QL STRIP: NEGATIVE
LEUKOCYTE ESTERASE UR QL STRIP.AUTO: NEGATIVE
NITRITE UR QL STRIP: NEGATIVE
PH UR STRIP.AUTO: 5.5 [PH] (ref 4.6–8)
PROT UR QL STRIP: NEGATIVE
RBC # UR: NORMAL /HPF
REF LAB TEST METHOD: NORMAL
SP GR UR STRIP: 1.01 (ref 1–1.03)
SQUAMOUS #/AREA URNS HPF: NORMAL /HPF
UROBILINOGEN UR QL STRIP: NORMAL
WBC UR QL AUTO: NORMAL /HPF

## 2020-10-21 PROCEDURE — 36415 COLL VENOUS BLD VENIPUNCTURE: CPT | Performed by: NURSE PRACTITIONER

## 2020-10-21 PROCEDURE — 81001 URINALYSIS AUTO W/SCOPE: CPT | Performed by: NURSE PRACTITIONER

## 2020-10-23 ENCOUNTER — TELEPHONE (OUTPATIENT)
Dept: FAMILY MEDICINE CLINIC | Facility: CLINIC | Age: 65
End: 2020-10-23

## 2020-10-23 NOTE — TELEPHONE ENCOUNTER
HAMMAD FROM [facility] CALLED STATING SHE HAS SOME QUESTIONS ABOUT A PRIOR AUTHORIZATION FOR:  fluticasone-salmeterol (Advair Diskus) 500-50 MCG/DOSE DISKUS     CASE NUMBER: [id number]    CALL BACK NUMBER: [phone]    PLEASE ADVISE.

## 2020-10-27 DIAGNOSIS — F41.9 ANXIETY: ICD-10-CM

## 2020-10-27 LAB — HFE GENE MUT ANL BLD/T: NORMAL

## 2020-10-27 RX ORDER — FLUCONAZOLE 150 MG/1
150 TABLET ORAL ONCE
Qty: 1 TABLET | Refills: 0 | Status: SHIPPED | OUTPATIENT
Start: 2020-10-27 | End: 2020-10-27

## 2020-10-27 RX ORDER — ALPRAZOLAM 0.5 MG/1
TABLET ORAL
Qty: 60 TABLET | Refills: 0 | Status: SHIPPED | OUTPATIENT
Start: 2020-10-27 | End: 2020-11-25

## 2020-10-28 NOTE — TELEPHONE ENCOUNTER
Resubmitted due to time lapse of my absence.   Approved medication ref # [id number] 10/28/2020- 10/28/2021  I spoke Betsy.[facility]

## 2020-11-25 DIAGNOSIS — F41.9 ANXIETY: ICD-10-CM

## 2020-11-25 RX ORDER — ALPRAZOLAM 0.5 MG/1
TABLET ORAL
Qty: 60 TABLET | Refills: 0 | Status: SHIPPED | OUTPATIENT
Start: 2020-11-25 | End: 2020-12-21

## 2020-12-18 DIAGNOSIS — F41.9 ANXIETY: ICD-10-CM

## 2020-12-18 RX ORDER — ALPRAZOLAM 0.5 MG/1
TABLET ORAL
Qty: 60 TABLET | OUTPATIENT
Start: 2020-12-18

## 2020-12-21 DIAGNOSIS — F41.9 ANXIETY: ICD-10-CM

## 2020-12-21 RX ORDER — ALPRAZOLAM 0.5 MG/1
TABLET ORAL
Qty: 60 TABLET | Refills: 0 | Status: SHIPPED | OUTPATIENT
Start: 2020-12-21 | End: 2021-01-18

## 2021-01-18 DIAGNOSIS — F41.9 ANXIETY: ICD-10-CM

## 2021-01-18 RX ORDER — ALPRAZOLAM 0.5 MG/1
TABLET ORAL
Qty: 60 TABLET | Refills: 0 | Status: SHIPPED | OUTPATIENT
Start: 2021-01-18 | End: 2021-02-19

## 2021-02-19 DIAGNOSIS — F41.9 ANXIETY: ICD-10-CM

## 2021-02-19 RX ORDER — ALPRAZOLAM 0.5 MG/1
TABLET ORAL
Qty: 60 TABLET | Refills: 0 | Status: SHIPPED | OUTPATIENT
Start: 2021-02-19 | End: 2021-03-19

## 2021-02-26 DIAGNOSIS — J44.9 CHRONIC OBSTRUCTIVE PULMONARY DISEASE, UNSPECIFIED COPD TYPE (HCC): ICD-10-CM

## 2021-02-26 RX ORDER — FOLIC ACID 1 MG/1
1000 TABLET ORAL DAILY
Qty: 90 TABLET | Refills: 1 | Status: SHIPPED | OUTPATIENT
Start: 2021-02-26 | End: 2021-09-03

## 2021-03-10 DIAGNOSIS — G43.819 OTHER MIGRAINE WITHOUT STATUS MIGRAINOSUS, INTRACTABLE: ICD-10-CM

## 2021-03-10 DIAGNOSIS — F32.A ANXIETY AND DEPRESSION: ICD-10-CM

## 2021-03-10 DIAGNOSIS — F41.9 ANXIETY AND DEPRESSION: ICD-10-CM

## 2021-03-10 RX ORDER — FLUOXETINE HYDROCHLORIDE 60 MG/1
TABLET, FILM COATED ORAL; ORAL
Qty: 90 TABLET | Refills: 3 | Status: SHIPPED | OUTPATIENT
Start: 2021-03-10

## 2021-03-10 RX ORDER — BUTALBITAL, ACETAMINOPHEN AND CAFFEINE 50; 325; 40 MG/1; MG/1; MG/1
TABLET ORAL
Qty: 60 TABLET | Refills: 0 | Status: SHIPPED | OUTPATIENT
Start: 2021-03-10 | End: 2021-04-07

## 2021-03-19 DIAGNOSIS — F41.9 ANXIETY: ICD-10-CM

## 2021-03-19 RX ORDER — ALPRAZOLAM 0.5 MG/1
0.5 TABLET ORAL 2 TIMES DAILY PRN
Qty: 60 TABLET | Refills: 0 | Status: SHIPPED | OUTPATIENT
Start: 2021-03-19 | End: 2021-04-20 | Stop reason: SDUPTHER

## 2021-03-19 RX ORDER — ALPRAZOLAM 0.5 MG/1
TABLET ORAL
Qty: 60 TABLET | OUTPATIENT
Start: 2021-03-19

## 2021-03-23 NOTE — TELEPHONE ENCOUNTER
[facility]    ----- Message from SALVATORE Rodriguez sent at 8/23/2019  3:16 PM EDT -----  Chol improved on lipitor LDL 80 goal < 100, triglycerides sl increased 172 goal < 150, enc low chol diet and exercise and we can monitor    
Problem: Falls - Risk of  Goal: *Absence of Falls  Description: Document Toshia Islas Fall Risk and appropriate interventions in the flowsheet.   Outcome: Progressing Towards Goal  Note: Fall Risk Interventions:  Mobility Interventions: Communicate number of staff needed for ambulation/transfer, Patient to call before getting OOB, PT Consult for mobility concerns, Strengthening exercises (ROM-active/passive)    Mentation Interventions: Adequate sleep, hydration, pain control, Eyeglasses and hearing aids, Increase mobility, Update white board    Medication Interventions: Evaluate medications/consider consulting pharmacy, Patient to call before getting OOB, Teach patient to arise slowly    Elimination Interventions: Call light in reach, Toileting schedule/hourly rounds    History of Falls Interventions: Consult care management for discharge planning, Door open when patient unattended         Problem: Ischemic Stroke: Discharge Outcomes  Goal: *Verbalizes anxiety and depression are reduced or absent  Outcome: Progressing Towards Goal  Goal: *Verbalize understanding of risk factor modification(Stroke Metric)  Outcome: Progressing Towards Goal  Goal: *Hemodynamically stable  Outcome: Progressing Towards Goal  Goal: *Absence of aspiration pneumonia  Outcome: Progressing Towards Goal  Goal: *Aware of needed dietary changes  Outcome: Progressing Towards Goal  Goal: *Verbalize understanding of prescribed medications including anti-coagulants, anti-lipid, and/or anti-platelets(Stroke Metric)  Outcome: Progressing Towards Goal  Goal: *Tolerating diet  Outcome: Progressing Towards Goal  Goal: *Aware of follow-up diagnostics related to anticoagulants  Outcome: Progressing Towards Goal  Goal: *Ability to perform ADLs and demonstrates progressive mobility and function  Outcome: Progressing Towards Goal  Goal: *Absence of DVT(Stroke Metric)  Outcome: Progressing Towards Goal  Goal: *Absence of aspiration  Outcome: Progressing Towards
Goal  Goal: *Optimal pain control at patient's stated goal  Outcome: Progressing Towards Goal  Goal: *Home safety concerns addressed  Outcome: Progressing Towards Goal  Goal: *Describes available resources and support systems  Outcome: Progressing Towards Goal  Goal: *Verbalizes understanding of activation of EMS(911) for stroke symptoms(Stroke Metric)  Outcome: Progressing Towards Goal  Goal: *Understands and describes signs and symptoms to report to providers(Stroke Metric)  Outcome: Progressing Towards Goal  Goal: *Neurolgocially stable (absence of additional neurological deficits)  Outcome: Progressing Towards Goal  Goal: *Verbalizes importance of follow-up with primary care physician(Stroke Metric)  Outcome: Progressing Towards Goal  Goal: *Smoking cessation discussed,if applicable(Stroke Metric)  Outcome: Progressing Towards Goal  Goal: *Depression screening completed(Stroke Metric)  Outcome: Progressing Towards Goal
normal...

## 2021-04-02 RX ORDER — ESTRADIOL 0.1 MG/G
CREAM VAGINAL
Qty: 1 EACH | Refills: 1 | Status: SHIPPED | OUTPATIENT
Start: 2021-04-02 | End: 2021-04-07

## 2021-04-02 RX ORDER — MONTELUKAST SODIUM 10 MG/1
TABLET ORAL
Qty: 90 TABLET | Refills: 1 | Status: SHIPPED | OUTPATIENT
Start: 2021-04-02 | End: 2021-09-21 | Stop reason: SDUPTHER

## 2021-04-02 RX ORDER — ESTRADIOL 0.1 MG/G
CREAM VAGINAL
Qty: 42.5 G | OUTPATIENT
Start: 2021-04-02

## 2021-04-06 ENCOUNTER — TELEPHONE (OUTPATIENT)
Dept: FAMILY MEDICINE CLINIC | Facility: CLINIC | Age: 66
End: 2021-04-06

## 2021-04-06 NOTE — TELEPHONE ENCOUNTER
Estrace vaginal cream not covered.  Preferred is Estradiolcre, tuvafemtab mcg, premarin vag cream.

## 2021-04-07 ENCOUNTER — TRANSCRIBE ORDERS (OUTPATIENT)
Dept: ADMINISTRATIVE | Facility: HOSPITAL | Age: 66
End: 2021-04-07

## 2021-04-07 DIAGNOSIS — G43.819 OTHER MIGRAINE WITHOUT STATUS MIGRAINOSUS, INTRACTABLE: ICD-10-CM

## 2021-04-07 DIAGNOSIS — Z12.39 SCREENING BREAST EXAMINATION: Primary | ICD-10-CM

## 2021-04-07 RX ORDER — ESTRADIOL 0.1 MG/G
CREAM VAGINAL
Qty: 42.5 G | Refills: 1 | Status: SHIPPED | OUTPATIENT
Start: 2021-04-07 | End: 2021-06-23 | Stop reason: SDUPTHER

## 2021-04-07 RX ORDER — BUTALBITAL, ACETAMINOPHEN AND CAFFEINE 50; 325; 40 MG/1; MG/1; MG/1
TABLET ORAL
Qty: 60 TABLET | Refills: 0 | Status: SHIPPED | OUTPATIENT
Start: 2021-04-07 | End: 2021-05-04 | Stop reason: SDUPTHER

## 2021-04-09 ENCOUNTER — TELEPHONE (OUTPATIENT)
Dept: FAMILY MEDICINE CLINIC | Facility: CLINIC | Age: 66
End: 2021-04-09

## 2021-04-12 RX ORDER — ESTRADIOL 0.1 MG/G
CREAM VAGINAL
Qty: 1 EACH | Refills: 12 | Status: SHIPPED | OUTPATIENT
Start: 2021-04-12

## 2021-04-19 DIAGNOSIS — E03.8 SUBCLINICAL HYPOTHYROIDISM: ICD-10-CM

## 2021-04-19 RX ORDER — LEVOTHYROXINE SODIUM 0.12 MG/1
TABLET ORAL
Qty: 45 TABLET | Refills: 5 | Status: SHIPPED | OUTPATIENT
Start: 2021-04-19

## 2021-04-20 DIAGNOSIS — F41.9 ANXIETY: ICD-10-CM

## 2021-04-20 RX ORDER — ALPRAZOLAM 0.5 MG/1
0.5 TABLET ORAL 2 TIMES DAILY PRN
Qty: 60 TABLET | Refills: 0 | Status: SHIPPED | OUTPATIENT
Start: 2021-04-20 | End: 2021-05-20

## 2021-04-26 ENCOUNTER — HOSPITAL ENCOUNTER (OUTPATIENT)
Dept: MAMMOGRAPHY | Facility: HOSPITAL | Age: 66
Discharge: HOME | End: 2021-04-26
Admitting: NURSE PRACTITIONER

## 2021-04-26 DIAGNOSIS — Z12.39 SCREENING BREAST EXAMINATION: ICD-10-CM

## 2021-04-26 PROCEDURE — 77067 SCR MAMMO BI INCL CAD: CPT

## 2021-04-26 PROCEDURE — 77063 BREAST TOMOSYNTHESIS BI: CPT

## 2021-04-30 DIAGNOSIS — G43.819 OTHER MIGRAINE WITHOUT STATUS MIGRAINOSUS, INTRACTABLE: ICD-10-CM

## 2021-04-30 RX ORDER — BUTALBITAL, ACETAMINOPHEN AND CAFFEINE 50; 325; 40 MG/1; MG/1; MG/1
TABLET ORAL
Qty: 60 TABLET | OUTPATIENT
Start: 2021-04-30

## 2021-05-04 DIAGNOSIS — G43.819 OTHER MIGRAINE WITHOUT STATUS MIGRAINOSUS, INTRACTABLE: ICD-10-CM

## 2021-05-04 RX ORDER — BUTALBITAL, ACETAMINOPHEN AND CAFFEINE 50; 325; 40 MG/1; MG/1; MG/1
1 TABLET ORAL EVERY 6 HOURS PRN
Qty: 60 TABLET | Refills: 3 | Status: SHIPPED | OUTPATIENT
Start: 2021-05-04 | End: 2021-08-23

## 2021-05-04 RX ORDER — SUMATRIPTAN 50 MG/1
TABLET, FILM COATED ORAL
Qty: 12 TABLET | Refills: 5 | Status: SHIPPED | OUTPATIENT
Start: 2021-05-04 | End: 2022-06-18

## 2021-05-20 DIAGNOSIS — F41.9 ANXIETY: ICD-10-CM

## 2021-05-20 RX ORDER — ALPRAZOLAM 0.5 MG/1
TABLET ORAL
Qty: 60 TABLET | Refills: 0 | Status: SHIPPED | OUTPATIENT
Start: 2021-05-20 | End: 2021-06-21

## 2021-06-11 ENCOUNTER — TELEPHONE (OUTPATIENT)
Dept: FAMILY MEDICINE CLINIC | Facility: CLINIC | Age: 66
End: 2021-06-11

## 2021-06-11 NOTE — TELEPHONE ENCOUNTER
PATIENT NEEDS TO SCHEDULE LABS AND URINE TEST PLEASE SEND OVER ORDERS.    CALL BACK #: [phone]

## 2021-06-17 ENCOUNTER — OFFICE VISIT (OUTPATIENT)
Dept: SLEEP MEDICINE | Facility: HOSPITAL | Age: 66
End: 2021-06-17

## 2021-06-17 VITALS — WEIGHT: 205.8 LBS | OXYGEN SATURATION: 99 % | HEART RATE: 71 BPM | BODY MASS INDEX: 35.13 KG/M2 | HEIGHT: 64 IN

## 2021-06-17 DIAGNOSIS — Z99.89 OSA ON CPAP: Primary | ICD-10-CM

## 2021-06-17 DIAGNOSIS — G47.33 OSA ON CPAP: Primary | ICD-10-CM

## 2021-06-17 DIAGNOSIS — IMO0002 SLEEP-RELATED HYPOVENTILATION: ICD-10-CM

## 2021-06-17 DIAGNOSIS — E66.01 CLASS 2 SEVERE OBESITY DUE TO EXCESS CALORIES WITH SERIOUS COMORBIDITY AND BODY MASS INDEX (BMI) OF 35.0 TO 35.9 IN ADULT (HCC): ICD-10-CM

## 2021-06-17 PROCEDURE — G0463 HOSPITAL OUTPT CLINIC VISIT: HCPCS

## 2021-06-17 PROCEDURE — 99213 OFFICE O/P EST LOW 20 MIN: CPT | Performed by: INTERNAL MEDICINE

## 2021-06-17 NOTE — PROGRESS NOTES
"Follow Up Sleep Disorders Center Note     Chief Complaint:  DARRION     Primary Care Physician: Elaine Fernandez APRN    Interval History:   The patient is a 66 y.o. female  who I last saw 6/24/2020 and that note was reviewed.  The patient reports she is stable without new complaints.  She goes to bed 11 PM and awakens between 8 and 10 AM.    Self-administered [place] Sleepiness Scale test results: 4  0-5 Lower normal daytime sleepiness  6-10 Higher normal daytime sleepiness  11-12 Mild, 13-15 Moderate, & 16-24 Severe excessive daytime sleepiness    Review of Systems:    A complete review of systems was done and all were negative with the exception of the above    Social History:    Social History     Socioeconomic History   • Marital status:      Spouse name: Not on file   • Number of children: Not on file   • Years of education: High school   • Highest education level: Not on file   Tobacco Use   • Smoking status: Current Every Day Smoker     Packs/day: 1.00     Years: 30.00     Pack years: 30.00     Types: Cigarettes   • Smokeless tobacco: Never Used   Substance and Sexual Activity   • Alcohol use: No   • Drug use: No   • Sexual activity: Yes     Partners: Male     Birth control/protection: Post-menopausal, Other     Comment: Hysterectomy       Allergies:  Vimovo [naproxen-esomeprazole] and Codeine     Medication Review: Her list was reviewed.      Vital Signs:    Vitals:    06/17/21 1100   Pulse: 71   SpO2: 99%   Weight: 93.4 kg (205 lb 12.8 oz)   Height: 162.6 cm (64\")     Body mass index is 35.33 kg/m².    Physical Exam:    Constitutional:  Well developed 66 y.o. female that appears in no apparent distress.  Awake & oriented times 3.  Normal mood with normal recent and remote memory and normal judgement.  Eyes:  Conjunctivae normal.  Oropharynx: Previously, moist mucous membranes without exudate and a large tongue and uvula and the uvula is slightly deformed and deviated to the patient's left and she has a " patent posterior opening and class II-3 Mallampati airway, patient is wearing a facemask.     Downloaded PAP Data Reviewed For Compliance:  DME is [facility] and she uses a fullface mask.  Downloads between 3/19 and 6/16/2021 compliance 100%.  Average usage is 9 hours and 30 minutes.  Average AHI is normal without leak.  Average Auto CPAP pressure is 17.6 and her auto CPAP is 12-20.    I have reviewed the above results and compared them with the patient's last downloads and reviewed with the patient.    Impression:   Obstructive sleep apnea with sleep-related hypoxia by overnight polysomnogram 6/4/2009 adequately treated with auto CPAP. The patient appears to be at goal with good compliance and usage. The patient has no complaints of hypersomnolence.    Plan:  Good sleep hygiene measures should be maintained.  Weight loss would be beneficial in this patient who is obese by BMI.      After evaluating the patient and assessing results available, the patient is benefiting from the treatment being provided.     The patient will continue auto CPAP.  After clinical evaluation and review of downloads, I recommend no changes to the patient's pressures.  A new prescription will be sent to the patient's DME.    The patient reports she has a second machine.  I encouraged her to bring the card in that the device could be set at similar pressures that her auto CPAP suggests.    I answered all of the patient's questions.  The patient will call for any problems and will follow up in 1 year.      Hector Trujillo MD  Sleep Medicine  06/17/21  11:16 EDT

## 2021-06-18 DIAGNOSIS — F41.9 ANXIETY: ICD-10-CM

## 2021-06-21 RX ORDER — ALPRAZOLAM 0.5 MG/1
TABLET ORAL
Qty: 60 TABLET | Refills: 0 | Status: SHIPPED | OUTPATIENT
Start: 2021-06-21 | End: 2021-07-21

## 2021-06-22 RX ORDER — NYSTATIN AND TRIAMCINOLONE ACETONIDE 100000; 1 [USP'U]/G; MG/G
OINTMENT TOPICAL
Qty: 30 G | Refills: 1 | Status: SHIPPED | OUTPATIENT
Start: 2021-06-22

## 2021-06-23 ENCOUNTER — OFFICE VISIT (OUTPATIENT)
Dept: FAMILY MEDICINE CLINIC | Facility: CLINIC | Age: 66
End: 2021-06-23

## 2021-06-23 ENCOUNTER — HOSPITAL ENCOUNTER (OUTPATIENT)
Dept: GENERAL RADIOLOGY | Facility: HOSPITAL | Age: 66
Discharge: HOME | End: 2021-06-23
Admitting: NURSE PRACTITIONER

## 2021-06-23 VITALS
HEIGHT: 64 IN | BODY MASS INDEX: 35.41 KG/M2 | OXYGEN SATURATION: 98 % | TEMPERATURE: 97.5 F | SYSTOLIC BLOOD PRESSURE: 122 MMHG | HEART RATE: 66 BPM | WEIGHT: 207.4 LBS | DIASTOLIC BLOOD PRESSURE: 60 MMHG

## 2021-06-23 DIAGNOSIS — M81.0 OSTEOPOROSIS, UNSPECIFIED OSTEOPOROSIS TYPE, UNSPECIFIED PATHOLOGICAL FRACTURE PRESENCE: ICD-10-CM

## 2021-06-23 DIAGNOSIS — F41.1 GENERALIZED ANXIETY DISORDER: ICD-10-CM

## 2021-06-23 DIAGNOSIS — E03.9 HYPOTHYROIDISM (ACQUIRED): ICD-10-CM

## 2021-06-23 DIAGNOSIS — E55.9 VITAMIN D DEFICIENCY: ICD-10-CM

## 2021-06-23 DIAGNOSIS — Z72.0 TOBACCO ABUSE: ICD-10-CM

## 2021-06-23 DIAGNOSIS — M25.512 ACUTE PAIN OF LEFT SHOULDER: Primary | ICD-10-CM

## 2021-06-23 DIAGNOSIS — R76.8 POSITIVE ANA (ANTINUCLEAR ANTIBODY): ICD-10-CM

## 2021-06-23 DIAGNOSIS — E04.2 MULTINODULAR GOITER: Chronic | ICD-10-CM

## 2021-06-23 DIAGNOSIS — G43.009 MIGRAINE WITHOUT AURA AND WITHOUT STATUS MIGRAINOSUS, NOT INTRACTABLE: ICD-10-CM

## 2021-06-23 DIAGNOSIS — Z79.899 LONG-TERM USE OF HIGH-RISK MEDICATION: ICD-10-CM

## 2021-06-23 DIAGNOSIS — J44.9 CHRONIC OBSTRUCTIVE PULMONARY DISEASE, UNSPECIFIED COPD TYPE (HCC): ICD-10-CM

## 2021-06-23 DIAGNOSIS — E78.2 MIXED HYPERLIPIDEMIA: ICD-10-CM

## 2021-06-23 DIAGNOSIS — M25.50 MULTIPLE JOINT PAIN: ICD-10-CM

## 2021-06-23 PROBLEM — M17.0 OSTEOARTHRITIS OF BOTH KNEES: Status: ACTIVE | Noted: 2019-01-29

## 2021-06-23 LAB
25(OH)D3 SERPL-MCNC: 45.8 NG/ML (ref 30–100)
ALBUMIN SERPL-MCNC: 4.6 G/DL (ref 3.5–5.2)
ALBUMIN/GLOB SERPL: 2 G/DL
ALP SERPL-CCNC: 195 U/L (ref 39–117)
ALT SERPL W P-5'-P-CCNC: 25 U/L (ref 1–33)
ANION GAP SERPL CALCULATED.3IONS-SCNC: 9.6 MMOL/L (ref 5–15)
AST SERPL-CCNC: 18 U/L (ref 1–32)
BACTERIA UR QL AUTO: ABNORMAL /HPF
BILIRUB SERPL-MCNC: 0.2 MG/DL (ref 0–1.2)
BILIRUB UR QL STRIP: NEGATIVE
BUN SERPL-MCNC: 8 MG/DL (ref 8–23)
BUN/CREAT SERPL: 11.1 (ref 7–25)
CALCIUM SPEC-SCNC: 9.9 MG/DL (ref 8.6–10.5)
CHLORIDE SERPL-SCNC: 102 MMOL/L (ref 98–107)
CHOLEST SERPL-MCNC: 162 MG/DL (ref 0–200)
CHROMATIN AB SERPL-ACNC: <10 IU/ML (ref 0–14)
CLARITY UR: CLEAR
CO2 SERPL-SCNC: 26.4 MMOL/L (ref 22–29)
COLOR UR: YELLOW
CREAT SERPL-MCNC: 0.72 MG/DL (ref 0.57–1)
CRP SERPL-MCNC: <0.3 MG/DL (ref 0–0.5)
ERYTHROCYTE [DISTWIDTH] IN BLOOD BY AUTOMATED COUNT: 12.5 % (ref 12.3–15.4)
ERYTHROCYTE [SEDIMENTATION RATE] IN BLOOD: 9 MM/HR (ref 0–30)
GFR SERPL CREATININE-BSD FRML MDRD: 81 ML/MIN/1.73
GLOBULIN UR ELPH-MCNC: 2.3 GM/DL
GLUCOSE SERPL-MCNC: 77 MG/DL (ref 65–99)
GLUCOSE UR STRIP-MCNC: NEGATIVE MG/DL
HCT VFR BLD AUTO: 46.9 % (ref 34–46.6)
HDLC SERPL-MCNC: 37 MG/DL (ref 40–60)
HGB BLD-MCNC: 14.3 G/DL (ref 12–15.9)
HGB UR QL STRIP.AUTO: NEGATIVE
KETONES UR QL STRIP: NEGATIVE
LDLC SERPL CALC-MCNC: 94 MG/DL (ref 0–100)
LDLC/HDLC SERPL: 2.41 {RATIO}
LEUKOCYTE ESTERASE UR QL STRIP.AUTO: ABNORMAL
LYMPHOCYTES # BLD AUTO: 1.9 10*3/MM3 (ref 0.7–3.1)
LYMPHOCYTES NFR BLD AUTO: 34.8 % (ref 19.6–45.3)
MCH RBC QN AUTO: 28.9 PG (ref 26.6–33)
MCHC RBC AUTO-ENTMCNC: 30.5 G/DL (ref 31.5–35.7)
MCV RBC AUTO: 94.9 FL (ref 79–97)
MONOCYTES # BLD AUTO: 0.5 10*3/MM3 (ref 0.1–0.9)
MONOCYTES NFR BLD AUTO: 9.1 % (ref 5–12)
NEUTROPHILS NFR BLD AUTO: 3.1 10*3/MM3 (ref 1.7–7)
NEUTROPHILS NFR BLD AUTO: 56.1 % (ref 42.7–76)
NITRITE UR QL STRIP: NEGATIVE
PH UR STRIP.AUTO: 5.5 [PH] (ref 4.6–8)
PLATELET # BLD AUTO: 227 10*3/MM3 (ref 140–450)
PMV BLD AUTO: 7.5 FL (ref 6–12)
POTASSIUM SERPL-SCNC: 4.1 MMOL/L (ref 3.5–5.2)
PROT SERPL-MCNC: 6.9 G/DL (ref 6–8.5)
PROT UR QL STRIP: NEGATIVE
RBC # BLD AUTO: 4.94 10*6/MM3 (ref 3.77–5.28)
RBC # UR: ABNORMAL /HPF
REF LAB TEST METHOD: ABNORMAL
SODIUM SERPL-SCNC: 138 MMOL/L (ref 136–145)
SP GR UR STRIP: <=1.005 (ref 1–1.03)
SQUAMOUS #/AREA URNS HPF: ABNORMAL /HPF
STARCH GRANULES URNS QL MICRO: ABNORMAL /HPF
TRIGL SERPL-MCNC: 179 MG/DL (ref 0–150)
TSH SERPL DL<=0.05 MIU/L-ACNC: 0.72 UIU/ML (ref 0.27–4.2)
URATE SERPL-MCNC: 4.6 MG/DL (ref 2.4–5.7)
UROBILINOGEN UR QL STRIP: ABNORMAL
VLDLC SERPL-MCNC: 31 MG/DL (ref 5–40)
WBC # BLD AUTO: 5.5 10*3/MM3 (ref 3.4–10.8)
WBC UR QL AUTO: ABNORMAL /HPF

## 2021-06-23 PROCEDURE — 86431 RHEUMATOID FACTOR QUANT: CPT | Performed by: NURSE PRACTITIONER

## 2021-06-23 PROCEDURE — 82306 VITAMIN D 25 HYDROXY: CPT | Performed by: NURSE PRACTITIONER

## 2021-06-23 PROCEDURE — 80061 LIPID PANEL: CPT | Performed by: NURSE PRACTITIONER

## 2021-06-23 PROCEDURE — 80053 COMPREHEN METABOLIC PANEL: CPT | Performed by: NURSE PRACTITIONER

## 2021-06-23 PROCEDURE — 73030 X-RAY EXAM OF SHOULDER: CPT

## 2021-06-23 PROCEDURE — 86140 C-REACTIVE PROTEIN: CPT | Performed by: NURSE PRACTITIONER

## 2021-06-23 PROCEDURE — 85652 RBC SED RATE AUTOMATED: CPT | Performed by: NURSE PRACTITIONER

## 2021-06-23 PROCEDURE — 85025 COMPLETE CBC W/AUTO DIFF WBC: CPT | Performed by: NURSE PRACTITIONER

## 2021-06-23 PROCEDURE — 84443 ASSAY THYROID STIM HORMONE: CPT | Performed by: NURSE PRACTITIONER

## 2021-06-23 PROCEDURE — 84550 ASSAY OF BLOOD/URIC ACID: CPT | Performed by: NURSE PRACTITIONER

## 2021-06-23 PROCEDURE — 36415 COLL VENOUS BLD VENIPUNCTURE: CPT | Performed by: NURSE PRACTITIONER

## 2021-06-23 PROCEDURE — 81001 URINALYSIS AUTO W/SCOPE: CPT | Performed by: NURSE PRACTITIONER

## 2021-06-23 PROCEDURE — 99214 OFFICE O/P EST MOD 30 MIN: CPT | Performed by: NURSE PRACTITIONER

## 2021-06-23 NOTE — PATIENT INSTRUCTIONS
Check labs and call with results, continue all chronic dz meds, order shoulder xray and FU with ortho, strongly encouraged smoking cessation defer today, order overdue dexa may need prolia or reclast, encourage overdue FU with rheum suspect joint pain not from statin but autoimmune, order thyroid US overdue update, defer low dose CT scan

## 2021-06-23 NOTE — PROGRESS NOTES
Chief Complaint  Follow-up    Subjective          Kaci Mcgee presents to [facility]  History of Present Illness   C/o L shoulder pain x 10 days worse after sleeping without injury trauma or trigger event, no hx of chronic L shoulder pain, pain 4/10 with stiff ROM, sees ortho Dr Smart for knee getting injections every 3 mo and pushing replacement as long as possible, had leftover compounded cream not much help, no prev imaging, has increased advil not much help, no numbness tingling or burning, no change in , Hx of neck sgy and had DVT in past, Sees Dr Jacobsen Rheum dx fibromyalgia and monitoring SIRI suspect discoid lupus missed FU 11/20  With chronic hypothyroid on levothyroxine 125 mcg 1/2 PO daily prev saw [facility] Peralta last TSH 08/20 normal request recheck today and doesn't want to FU with endo as stable, with hot flashes and hx of multinodular thyroid on US last checked 07/18 request recheck  With chronic migraines on fioricet worse with pressure changes and rain recently, gets #60 a month, doesn't want to see neuro at this time as stable, prev amitriptyline and topamax no help, uses sumatriptan prn to break bad HA  With chronic chol on atorvastatin 40 mg increased last year, wondering if c/t joint pain and notes sisters unable to take medication, fasting for labs last checked 08/20   With chronic angina and MVP prev saw Dr Bassett cardiology last stress test 11/19 on acebutolol 200 mg sx stable no CP dizziness HA LE edema  S/p full hyst for endometriosis last mammo 04/21 no breast lumps, nipple dc or pain, has seen GYN Dr Washington 07/31/18 but retired, still on estrace cream for chronic UTI, has seen Dr Granda urology prn in past but stable and no urin sx, no blood or vag sx, last dexa 11/18 osteoporosis was on forteo but caused leg pain no other meds tried and notes sister also has osteoporosis, s/o wrist fracture 09/19 from fall at home, still smoking  "1 ppd x 30 years doesn't want to quit or check low dose CT scan  With chronic asthma, COPD and CVID immune deficiency prev infusions caused flu like symptoms on advair and singulair 10 mg and has albuterol rarely only in fall, no SOA wheezing or coughing, failed breo   With chronic depression and anxiety stable on fluoxetine 60 mg and xanax 0.5 mg BID x many years mother passed from ALS and was her primary caregiver > 20 years, daughter and grandson also with panic, prev seen counseling doesn't think needs at this time,doesn't want to see psych as mood stable, lives with  supportive  With chronic DARRION on CPAP sx stable sees Dr Trujillo last saw 06/21 FU annually and compliant nightly  Last colonoscopy 06/19 polyp removed FU with Dr Mooney 5 years, with hx of high alk phos has already seen Dr Barakat GI attributes to CVID    Objective   Vital Signs:   /60 (BP Location: Left arm, Patient Position: Sitting, Cuff Size: Adult)   Pulse 66   Temp 97.5 °F (36.4 °C) (Temporal)   Ht 162.6 cm (64\")   Wt 94.1 kg (207 lb 6.4 oz)   SpO2 98%   BMI 35.60 kg/m²     Physical Exam  Vitals reviewed.   Constitutional:       Appearance: She is well-developed.   HENT:      Head: Normocephalic and atraumatic.   Eyes:      Conjunctiva/sclera: Conjunctivae normal.      Pupils: Pupils are equal, round, and reactive to light.   Cardiovascular:      Rate and Rhythm: Normal rate and regular rhythm.      Pulses: Normal pulses.      Heart sounds: Normal heart sounds.   Pulmonary:      Effort: Pulmonary effort is normal.      Breath sounds: Wheezing (RLL) present.   Abdominal:      General: There is no distension.      Palpations: Abdomen is soft. There is no mass.      Tenderness: There is no abdominal tenderness. There is no right CVA tenderness, left CVA tenderness, guarding or rebound.      Hernia: No hernia is present.   Musculoskeletal:         General: Normal range of motion.      Cervical back: Normal range of motion.      " Right lower leg: No edema.      Left lower leg: No edema.   Skin:     General: Skin is warm and dry.   Neurological:      Mental Status: She is alert and oriented to person, place, and time.   Psychiatric:         Mood and Affect: Mood normal.         Behavior: Behavior normal.         Thought Content: Thought content normal.         Judgment: Judgment normal.        Result Review :                 Assessment and Plan    Diagnoses and all orders for this visit:    1. Acute pain of left shoulder (Primary)  -     XR Shoulder 2+ View Left  -     CBC & Differential  -     Comprehensive Metabolic Panel  -     SIRI  -     C-reactive Protein  -     Rheumatoid Factor  -     Sedimentation Rate  -     Uric Acid    2. Osteoporosis, unspecified osteoporosis type, unspecified pathological fracture presence  -     DEXA Bone Density Axial; Future    3. Positive SIRI (antinuclear antibody)  -     SIRI  -     C-reactive Protein  -     Rheumatoid Factor  -     Sedimentation Rate  -     Uric Acid    4. Multiple joint pain  -     C-reactive Protein  -     Rheumatoid Factor  -     Sedimentation Rate  -     Uric Acid    5. Mixed hyperlipidemia  -     CBC & Differential  -     Comprehensive Metabolic Panel  -     Lipid Panel  -     TSH    6. Multinodular goiter  -     TSH  -     US Thyroid    7. Hypothyroidism (acquired)  -     TSH  -     US Thyroid    8. Migraine without aura and without status migrainosus, not intractable  -     CBC & Differential  -     Comprehensive Metabolic Panel  -     TSH  -     Urinalysis With Microscopic - Urine, Clean Catch    9. Chronic obstructive pulmonary disease, unspecified COPD type (CMS/HCC)  -     CBC & Differential  -     Comprehensive Metabolic Panel    10. Tobacco abuse    11. Generalized anxiety disorder  -     CBC & Differential  -     Comprehensive Metabolic Panel  -     TSH  -     Urinalysis With Microscopic - Urine, Clean Catch  -     Compliance Drug Analysis, Ur - Urine, Clean Catch    12.  Long-term use of high-risk medication  -     Compliance Drug Analysis, Ur - Urine, Clean Catch    13. Vitamin D deficiency  -     Vitamin D 25 Hydroxy        Follow Up   Return in about 3 months (around 9/23/2021).  Patient was given instructions and counseling regarding her condition or for health maintenance advice. Please see specific information pulled into the AVS if appropriate.   Check labs and call with results, continue all chronic dz meds, order shoulder xray and FU with ortho, strongly encouraged smoking cessation defer today, order overdue dexa may need prolia or reclast, encourage overdue FU with rheum suspect joint pain not from statin but autoimmune, order thyroid US overdue update, defer low dose CT scan

## 2021-06-24 LAB
ANA SER QL: POSITIVE
DSDNA AB SER-ACNC: 2 IU/ML (ref 0–9)
Lab: NORMAL

## 2021-06-28 RX ORDER — AZITHROMYCIN 250 MG/1
TABLET, FILM COATED ORAL
Qty: 6 TABLET | Refills: 0 | Status: SHIPPED | OUTPATIENT
Start: 2021-06-28 | End: 2021-07-20

## 2021-06-29 LAB — DRUGS UR: NORMAL

## 2021-06-30 ENCOUNTER — APPOINTMENT (OUTPATIENT)
Dept: ULTRASOUND IMAGING | Facility: HOSPITAL | Age: 66
End: 2021-06-30

## 2021-07-06 ENCOUNTER — TELEPHONE (OUTPATIENT)
Dept: FAMILY MEDICINE CLINIC | Facility: CLINIC | Age: 66
End: 2021-07-06

## 2021-07-06 RX ORDER — DOXYCYCLINE HYCLATE 100 MG
100 TABLET ORAL 2 TIMES DAILY
Qty: 20 TABLET | Refills: 0 | Status: SHIPPED | OUTPATIENT
Start: 2021-07-06 | End: 2021-07-20

## 2021-07-06 NOTE — TELEPHONE ENCOUNTER
----- Message from Jessica Bojorquez MA sent at 7/6/2021 12:19 PM EDT -----  Regarding: FW: Non-Urgent Medical Question  Contact: [phone]    ----- Message -----  From: Kaci Mcgee  Sent: 7/6/2021  12:19 PM EDT  To: Berenice Winters [facility]  Subject: Non-Urgent Medical Question                      Hi Elaine,    I still have a head full of mucus and now it's gone from clear to green in color.  I finished my ZPack several days ago.  My head is full of this stuff and I've had Vertigo for about 2 weeks. My ears pop when I blow my nose.     Kaci Mcgee

## 2021-07-20 ENCOUNTER — LAB (OUTPATIENT)
Dept: FAMILY MEDICINE CLINIC | Facility: CLINIC | Age: 66
End: 2021-07-20

## 2021-07-20 ENCOUNTER — OFFICE VISIT (OUTPATIENT)
Dept: FAMILY MEDICINE CLINIC | Facility: CLINIC | Age: 66
End: 2021-07-20

## 2021-07-20 DIAGNOSIS — R30.0 DYSURIA: ICD-10-CM

## 2021-07-20 DIAGNOSIS — J06.9 VIRAL URI: ICD-10-CM

## 2021-07-20 DIAGNOSIS — B37.31 YEAST VAGINITIS: Primary | ICD-10-CM

## 2021-07-20 LAB
BACTERIA UR QL AUTO: NORMAL /HPF
BILIRUB UR QL STRIP: NEGATIVE
CLARITY UR: CLEAR
COLOR UR: YELLOW
GLUCOSE UR STRIP-MCNC: NEGATIVE MG/DL
HGB UR QL STRIP.AUTO: NEGATIVE
KETONES UR QL STRIP: NEGATIVE
LEUKOCYTE ESTERASE UR QL STRIP.AUTO: NEGATIVE
NITRITE UR QL STRIP: NEGATIVE
PH UR STRIP.AUTO: 6 [PH] (ref 4.6–8)
PROT UR QL STRIP: NEGATIVE
RBC # UR: NORMAL /HPF
REF LAB TEST METHOD: NORMAL
SP GR UR STRIP: <=1.005 (ref 1–1.03)
SQUAMOUS #/AREA URNS HPF: NORMAL /HPF
UROBILINOGEN UR QL STRIP: NORMAL
WBC UR QL AUTO: NORMAL /HPF

## 2021-07-20 PROCEDURE — 81001 URINALYSIS AUTO W/SCOPE: CPT | Performed by: NURSE PRACTITIONER

## 2021-07-20 PROCEDURE — 99442 PR PHYS/QHP TELEPHONE EVALUATION 11-20 MIN: CPT | Performed by: NURSE PRACTITIONER

## 2021-07-20 RX ORDER — FLUCONAZOLE 150 MG/1
150 TABLET ORAL ONCE
Qty: 1 TABLET | Refills: 0 | Status: SHIPPED | OUTPATIENT
Start: 2021-07-20 | End: 2021-07-20

## 2021-07-20 NOTE — PROGRESS NOTES
Chief Complaint  Urinary Tract Infection    Subjective       Kaci YOVANNY Mcgee presents to [facility]  History of Present Illness  C/o vag pain beginning 4-5 days and itching beginning after taking abx zpack and doxycycline for bronchitis still with nasal and sinus congestion with chronic allergies and COPD on singulair, zyrtec, flonase, inhaler, no SOA wheezing or cough, started taking OTC 3 day yeast infection but  and woke up last night, with hx of chronic UTI on estrace cream but hasn't used recently d/t vag pain and swelling,  no new sexual partners, recently went to Rheum last week and noticed blood in urine recommended FU with PCP, has increased H20, wipes front to back, denies urin urgency and freq, with recent COVID-19 test negative despite lingering URI sx    Objective   Vital Signs:   There were no vitals taken for this visit.    Physical Exam  Vitals reviewed.   Constitutional:       Appearance: She is well-developed.   Pulmonary:      Effort: Pulmonary effort is normal.      Breath sounds: Normal breath sounds.   Neurological:      Mental Status: She is alert and oriented to person, place, and time.   Psychiatric:         Mood and Affect: Mood normal.         Behavior: Behavior normal.         Thought Content: Thought content normal.         Judgment: Judgment normal.        Result Review :                 Assessment and Plan    Diagnoses and all orders for this visit:    1. Yeast vaginitis (Primary)    2. Dysuria  -     Urinalysis With Microscopic - Urine, Clean Catch    Other orders  -     fluconazole (Diflucan) 150 MG tablet; Take 1 tablet by mouth 1 (One) Time for 1 dose.  Dispense: 1 tablet; Refill: 0      I spent 11 minutes caring for Kaci on this date of service. This time includes time spent by me in the following activities:preparing for the visit, reviewing tests, obtaining and/or reviewing a separately obtained history, performing a medically  appropriate examination and/or evaluation , counseling and educating the patient/family/caregiver, ordering medications, tests, or procedures and documenting information in the medical record  Follow Up   No follow-ups on file.  Patient was given instructions and counseling regarding her condition or for health maintenance advice. Please see specific information pulled into the AVS if appropriate.     You have chosen to receive care through a telephone visit. Do you consent to use a telephone visit for your medical care today? Yes spent 11 min on phone with patient UA clear today suspect yeast vaginitis s/t recent abx usage, erx diflucan 150 mg 1 PO today and monitor for resolution, concern for RSV with lingering head cold sx and will monitor, increase H20 and rest, Wipe front to back, increase H20 8 glasses day, urinate after intercourse, enc freq toileting, call or RTC if sx persist or worsen

## 2021-07-20 NOTE — PATIENT INSTRUCTIONS
You have chosen to receive care through a telephone visit. Do you consent to use a telephone visit for your medical care today? Yes spent 11 min on phone with patient UA clear today suspect yeast vaginitis s/t recent abx usage, erx diflucan 150 mg 1 PO today and monitor for resolution, concern for RSV with lingering head cold sx and will monitor, increase H20 and rest, Wipe front to back, increase H20 8 glasses day, urinate after intercourse, enc freq toileting, call or RTC if sx persist or worsen   3

## 2021-07-21 DIAGNOSIS — F41.9 ANXIETY: ICD-10-CM

## 2021-07-21 RX ORDER — ALPRAZOLAM 0.5 MG/1
TABLET ORAL
Qty: 60 TABLET | Refills: 0 | Status: SHIPPED | OUTPATIENT
Start: 2021-07-21 | End: 2021-08-23

## 2021-08-09 RX ORDER — ATORVASTATIN CALCIUM 40 MG/1
40 TABLET, FILM COATED ORAL NIGHTLY
Qty: 90 TABLET | Refills: 3 | Status: SHIPPED | OUTPATIENT
Start: 2021-08-09

## 2021-08-23 DIAGNOSIS — F41.9 ANXIETY: ICD-10-CM

## 2021-08-23 DIAGNOSIS — G43.819 OTHER MIGRAINE WITHOUT STATUS MIGRAINOSUS, INTRACTABLE: ICD-10-CM

## 2021-08-23 RX ORDER — BUTALBITAL, ACETAMINOPHEN AND CAFFEINE 50; 325; 40 MG/1; MG/1; MG/1
TABLET ORAL
Qty: 60 TABLET | Refills: 0 | Status: SHIPPED | OUTPATIENT
Start: 2021-08-23 | End: 2021-09-20

## 2021-08-23 RX ORDER — ALPRAZOLAM 0.5 MG/1
TABLET ORAL
Qty: 60 TABLET | Refills: 0 | Status: SHIPPED | OUTPATIENT
Start: 2021-08-23 | End: 2021-09-20

## 2021-09-03 DIAGNOSIS — R51.9 CHRONIC DAILY HEADACHE: ICD-10-CM

## 2021-09-03 DIAGNOSIS — J44.9 CHRONIC OBSTRUCTIVE PULMONARY DISEASE, UNSPECIFIED COPD TYPE (HCC): ICD-10-CM

## 2021-09-03 RX ORDER — ACEBUTOLOL HYDROCHLORIDE 200 MG/1
200 CAPSULE ORAL DAILY
Qty: 90 CAPSULE | Refills: 3 | Status: SHIPPED | OUTPATIENT
Start: 2021-09-03

## 2021-09-03 RX ORDER — FOLIC ACID 1 MG/1
1000 TABLET ORAL DAILY
Qty: 90 TABLET | Refills: 1 | Status: SHIPPED | OUTPATIENT
Start: 2021-09-03

## 2021-09-03 RX ORDER — FOLIC ACID 1 MG/1
1000 TABLET ORAL DAILY
Qty: 90 TABLET | Refills: 1 | Status: SHIPPED | OUTPATIENT
Start: 2021-09-03 | End: 2022-06-18

## 2021-09-20 DIAGNOSIS — G43.819 OTHER MIGRAINE WITHOUT STATUS MIGRAINOSUS, INTRACTABLE: ICD-10-CM

## 2021-09-20 DIAGNOSIS — F41.9 ANXIETY: ICD-10-CM

## 2021-09-20 RX ORDER — BUTALBITAL, ACETAMINOPHEN AND CAFFEINE 50; 325; 40 MG/1; MG/1; MG/1
TABLET ORAL
Qty: 60 TABLET | Refills: 1 | Status: SHIPPED | OUTPATIENT
Start: 2021-09-20

## 2021-09-20 RX ORDER — ALPRAZOLAM 0.5 MG/1
TABLET ORAL
Qty: 60 TABLET | Refills: 0 | Status: SHIPPED | OUTPATIENT
Start: 2021-09-20

## 2021-09-21 RX ORDER — MONTELUKAST SODIUM 10 MG/1
10 TABLET ORAL DAILY
Qty: 90 TABLET | Refills: 1 | Status: SHIPPED | OUTPATIENT
Start: 2021-09-21

## 2021-11-22 ENCOUNTER — HOSPITAL ENCOUNTER (OUTPATIENT)
Dept: BONE DENSITY | Facility: HOSPITAL | Age: 66
Discharge: HOME | End: 2021-11-22
Admitting: NURSE PRACTITIONER

## 2021-11-22 DIAGNOSIS — M81.0 OSTEOPOROSIS, UNSPECIFIED OSTEOPOROSIS TYPE, UNSPECIFIED PATHOLOGICAL FRACTURE PRESENCE: ICD-10-CM

## 2021-11-22 PROCEDURE — 77080 DXA BONE DENSITY AXIAL: CPT

## 2022-02-07 ENCOUNTER — TELEPHONE (OUTPATIENT)
Dept: SLEEP MEDICINE | Facility: HOSPITAL | Age: 67
End: 2022-02-07

## 2022-02-07 NOTE — TELEPHONE ENCOUNTER
Patient requested an order for a new CPAP since hers is 5 years old and on recall, she would like the order mailed to her so she can go to whatever facility she likes .. will request order from Dr Trujillo and mail to patient

## 2022-04-29 ENCOUNTER — TRANSCRIBE ORDERS (OUTPATIENT)
Dept: ADMINISTRATIVE | Facility: HOSPITAL | Age: 67
End: 2022-04-29

## 2022-04-29 DIAGNOSIS — Z12.31 SCREENING MAMMOGRAM, ENCOUNTER FOR: Primary | ICD-10-CM

## 2022-05-05 ENCOUNTER — APPOINTMENT (OUTPATIENT)
Dept: MAMMOGRAPHY | Facility: HOSPITAL | Age: 67
End: 2022-05-05

## 2022-05-10 ENCOUNTER — HOSPITAL ENCOUNTER (OUTPATIENT)
Dept: MAMMOGRAPHY | Facility: HOSPITAL | Age: 67
Discharge: HOME | End: 2022-05-10
Admitting: NURSE PRACTITIONER

## 2022-05-10 DIAGNOSIS — Z12.31 SCREENING MAMMOGRAM, ENCOUNTER FOR: ICD-10-CM

## 2022-05-10 PROCEDURE — 77067 SCR MAMMO BI INCL CAD: CPT

## 2022-05-10 PROCEDURE — 77063 BREAST TOMOSYNTHESIS BI: CPT

## 2022-06-08 ENCOUNTER — TELEPHONE (OUTPATIENT)
Dept: GASTROENTEROLOGY | Facility: CLINIC | Age: 67
End: 2022-06-08

## 2022-06-09 ENCOUNTER — OFFICE VISIT (OUTPATIENT)
Dept: SLEEP MEDICINE | Facility: HOSPITAL | Age: 67
End: 2022-06-09

## 2022-06-09 VITALS — BODY MASS INDEX: 35.51 KG/M2 | HEIGHT: 64 IN | WEIGHT: 208 LBS

## 2022-06-09 DIAGNOSIS — G47.14 HYPERSOMNIA DUE TO MEDICAL CONDITION: ICD-10-CM

## 2022-06-09 DIAGNOSIS — Z72.0 TOBACCO USE: Primary | ICD-10-CM

## 2022-06-09 DIAGNOSIS — E66.01 CLASS 2 SEVERE OBESITY DUE TO EXCESS CALORIES WITH SERIOUS COMORBIDITY AND BODY MASS INDEX (BMI) OF 35.0 TO 35.9 IN ADULT: ICD-10-CM

## 2022-06-09 DIAGNOSIS — G47.36 HYPOXEMIA ASSOCIATED WITH SLEEP: ICD-10-CM

## 2022-06-09 DIAGNOSIS — G47.33 OSA ON CPAP: ICD-10-CM

## 2022-06-09 DIAGNOSIS — Z99.89 OSA ON CPAP: ICD-10-CM

## 2022-06-09 PROCEDURE — G0463 HOSPITAL OUTPT CLINIC VISIT: HCPCS

## 2022-06-09 PROCEDURE — 99213 OFFICE O/P EST LOW 20 MIN: CPT | Performed by: INTERNAL MEDICINE

## 2022-06-09 NOTE — PROGRESS NOTES
"Follow Up [facility] Disorders [facility] Note     Chief Complaint:  DARRION     Primary Care Physician: Elaine Fernandez APRN    Interval History:   The patient is a 67 y.o. female  who I last saw 6/17/2021 and that note was reviewed.  The patient reports she is unchanged.  She goes to bed 11 PM and awakens at 9:30 AM.  She has been having some increasing restlessness.  She reports her brother recently passed away.    Review of Systems:    A complete review of systems was done and all were negative with the exception of the above    Social History:    Social History     Socioeconomic History   • Marital status:    • Years of education: High school   Tobacco Use   • Smoking status: Current Every Day Smoker     Packs/day: 1.00     Years: 30.00     Pack years: 30.00     Types: Cigarettes   • Smokeless tobacco: Never Used   Substance and Sexual Activity   • Alcohol use: No   • Drug use: No   • Sexual activity: Yes     Partners: Male     Birth control/protection: Post-menopausal, Other     Comment: Hysterectomy       Allergies:  Vimovo [naproxen-esomeprazole] and Codeine     Medication Review: Her list was reviewed.  She does take Xanax and Fioricet.    Vital Signs:    Vitals:    06/09/22 1100   Weight: 94.3 kg (208 lb)   Height: 162.6 cm (64.02\")     Body mass index is 35.69 kg/m².    Self-administered [place] Sleepiness Scale test results: 8  0-5 Lower normal daytime sleepiness  6-10 Higher normal daytime sleepiness  11-12 Mild, 13-15 Moderate, & 16-24 Severe excessive daytime sleepiness    Physical Exam:    Constitutional:  Well developed 67 y.o. female that appears in no apparent distress.  Awake & oriented times 3.  Normal mood with normal recent and remote memory and normal judgement.  Eyes:  Conjunctivae normal.  Oropharynx: Previously, moist mucous membranes without exudate and a large tongue and uvula and the uvula is slightly deformed and deviated to the patient's left and she has a patent posterior pharyngeal " opening and class II-3 Mallampati airway, patient is wearing a facemask.     Downloaded PAP Data Reviewed For Compliance:  DME is [facility] in [facility] and she uses a fullface mask.  Downloads between 3/11 and 6/8/2022 compliance 100%.  Average usage is 9 hours and 16 minutes.  Average AHI is normal without leak.  Average auto CPAP pressure is 17.5 and her auto CPAP is 12-20.    I have reviewed the above results and compared them with the patient's last downloads and reviewed with the patient.    Impression:   Obstructive sleep apnea with sleep-related hypoxia by overnight polysomnogram 6/4/2009 adequately treated with auto CPAP. The patient appears to be at goal with good compliance and usage. The patient has some complaints of hypersomnolence.    Plan:  Good sleep hygiene measures should be maintained.  Weight loss would be beneficial in this patient who has class II severe obesity by BMI.      After evaluating the patient and assessing results available, the patient is benefiting from the treatment being provided.     The patient will continue auto CPAP.  After clinical evaluation and review of downloads, I recommend no changes to the patient's pressures.  A new prescription will be sent to the patient's DME.    Due to the recall, the patient does not use humidity and she is dry.  She states she is waiting on a new ResMed device from [facility] in [facility]?  Reportedly, she is started on the list.    I answered all of the patient's questions.  The patient will call for any problems and will follow up in 3 months after obtaining her new device..      Hector Trujillo MD  Sleep Medicine  06/09/22  11:46 EDT

## 2022-06-18 PROBLEM — G47.36 HYPOXEMIA ASSOCIATED WITH SLEEP: Status: ACTIVE | Noted: 2020-06-27

## 2022-06-18 PROBLEM — G47.14 HYPERSOMNIA DUE TO MEDICAL CONDITION: Status: ACTIVE | Noted: 2022-06-18

## 2022-06-29 ENCOUNTER — TELEPHONE (OUTPATIENT)
Dept: SLEEP MEDICINE | Facility: HOSPITAL | Age: 67
End: 2022-06-29

## 2022-08-11 ENCOUNTER — OFFICE VISIT (OUTPATIENT)
Dept: GASTROENTEROLOGY | Facility: CLINIC | Age: 67
End: 2022-08-11

## 2022-08-11 VITALS
SYSTOLIC BLOOD PRESSURE: 148 MMHG | WEIGHT: 206.2 LBS | BODY MASS INDEX: 35.2 KG/M2 | DIASTOLIC BLOOD PRESSURE: 70 MMHG | HEIGHT: 64 IN | HEART RATE: 65 BPM | TEMPERATURE: 97.5 F

## 2022-08-11 DIAGNOSIS — R74.8 ELEVATED ALKALINE PHOSPHATASE LEVEL: Primary | ICD-10-CM

## 2022-08-11 PROCEDURE — 99203 OFFICE O/P NEW LOW 30 MIN: CPT | Performed by: INTERNAL MEDICINE

## 2022-08-11 NOTE — PROGRESS NOTES
Chief Complaint   Patient presents with   • Abnormal Lab   • Abnormal Imaging     Kaci Mcgee is a 67 y.o. female who presents with a history of elevated alkaline phosphatase and fatty liver  HPI     Patient 67-year-old female with history of fibromyalgia, hypothyroid, hyperlipidemia as well as COPD and recently diagnosed discoid lupus noted with elevated alkaline phosphatase.  Patient's previous work-up at [facility] was unremarkable for liver disease.  Recent ultrasound with fatty liver here for further recommendations.    Past Medical History:   Diagnosis Date   • Anxiety and depression    • Aortic sclerosis     followed by Dr. Smith, Vascular Surgery   • Asthma     mild intermittent   • Clotting disorder (HCC)    • COPD (chronic obstructive pulmonary disease) (HCC)    • CVID (common variable immunodeficiency) (HCC)     started to have a bad reaction to IVIG after several years   • Deep vein thrombosis (HCC)     BEHIND LEFT KNEE, followed by Hematology Ramiro Benavides    • Depression    • DJD (degenerative joint disease), lumbar    • Elevated alkaline phosphatase level     chronic, due to CVID   • Fatty liver 2006 ?   • Fibromyalgia    • History of pneumonia 1999   • Hyperlipidemia    • Hypothyroidism     followed by Endocrinology, Dr. Kuhn   • Lupus (HCC)     cutaneous, followed by Rheumatology, Dr. Mendoza June   • Migraines    • Multinodular goiter    • MVP (mitral valve prolapse)     followed by Cardiology, Dr. Bassett   • DARRION on autoCPAP 06/04/2009    Overnight polysomnogram. Weight 148#.  AHI moderately abnormal at 15 events per hour.  Lowest O2 saturation 81% and sleep-related hypoxia present for 2% of total sleep time.   • Osteoarthritis     followed by [facility], Dr. Hugo Costa   • Osteoporosis    • PONV (postoperative nausea and vomiting)    • Prediabetes    • Pulmonary embolism (HCC) 1998    x 4 after neck surgery, took coumadin x 10 yr but did not tolerate it   • Recurrent UTI    •  Sciatica    • Tobacco abuse        Current Outpatient Medications:   •  acebutolol (SECTRAL) 200 MG capsule, TAKE 1 CAPSULE BY MOUTH DAILY, Disp: 90 capsule, Rfl: 3  •  Advair Diskus 500-50 MCG/DOSE DISKUS, Inhale 1 puff 2 (Two) Times a Day., Disp: 60 each, Rfl: 5  •  ALPRAZolam (XANAX) 0.5 MG tablet, TAKE 1 TABLET BY MOUTH TWICE DAILY AS NEEDED FOR ANXIETY, Disp: 60 tablet, Rfl: 0  •  atorvastatin (LIPITOR) 40 MG tablet, TAKE 1 TABLET BY MOUTH EVERY NIGHT, Disp: 90 tablet, Rfl: 3  •  butalbital-acetaminophen-caffeine (FIORICET, ESGIC) -40 MG per tablet, TAKE 1 TABLET BY MOUTH EVERY 6 HOURS AS NEEDED FOR HEADACHE, Disp: 60 tablet, Rfl: 1  •  cetirizine (zyrTEC) 10 MG tablet, Take 10 mg by mouth daily., Disp: , Rfl:   •  estradiol (ESTRACE) 0.1 MG/GM vaginal cream, Insert 1 g vaginally HS twice weekly, Disp: 1 each, Rfl: 12  •  FLUoxetine (PROzac) 60 MG tablet, TAKE 1 TABLET BY MOUTH EVERY DAY, Disp: 90 tablet, Rfl: 3  •  folic acid (FOLVITE) 1 MG tablet, TAKE 1 TABLET BY MOUTH DAILY, Disp: 90 tablet, Rfl: 1  •  levalbuterol (XOPENEX HFA) 45 MCG/ACT inhaler, INHALE 1 TO 2 PUFFS BY MOUTH EVERY 4 HOURS IF NEEDED FOR WHEEZING OR SHORTNESS OF BREATH, Disp: 15 g, Rfl: 11  •  levothyroxine (SYNTHROID, LEVOTHROID) 125 MCG tablet, TAKE 1/2 TABLET BY MOUTH EVERY DAY, Disp: 45 tablet, Rfl: 5  •  montelukast (SINGULAIR) 10 MG tablet, Take 1 tablet by mouth Daily., Disp: 90 tablet, Rfl: 1  •  Multiple Vitamins-Minerals (STRESS TAB NF PO), Take 1 tablet by mouth Daily. vitamin , Disp: , Rfl:   •  nystatin-triamcinolone (MYCOLOG) 665468-1.1 UNIT/GM-% ointment, APPLY TO THE AFFECTED AREA TWICE DAILY, Disp: 30 g, Rfl: 1  •  Omega-3 Fatty Acids (FISH OIL PO), Take 2 capsules by mouth Daily., Disp: , Rfl:   Allergies   Allergen Reactions   • Vimovo [Naproxen-Esomeprazole] Shortness Of Breath and Palpitations   • Codeine Nausea And Vomiting     Social History     Socioeconomic History   • Marital status:    • Years of  education: High school   Tobacco Use   • Smoking status: Current Every Day Smoker     Packs/day: 0.50     Years: 30.00     Pack years: 15.00     Types: Cigarettes   • Smokeless tobacco: Never Used   Substance and Sexual Activity   • Alcohol use: No   • Drug use: No   • Sexual activity: Yes     Partners: Male     Birth control/protection: Post-menopausal, Other     Comment: Hysterectomy     Family History   Problem Relation Age of Onset   • Thyroid disease Mother         Hypothyroid   • ALS Mother    • Anxiety disorder Mother    • Aortic aneurysm Father    • Heart disease Father    • Breast cancer Sister 50   • Cerebral aneurysm Sister    • Depression Sister    • Hypertension Sister    • Diabetes Sister    • Cirrhosis Sister    • Thyroid cancer Cousin    • Cerebral aneurysm Sister    • Depression Sister    • Hypertension Sister    • Diabetes Sister    • Arthritis Brother    • Hypertension Brother    • Colon polyps Brother         fine   • Cancer Sister         Breast   • Depression Sister    • Malig Hyperthermia Sister    • Depression Sister    • Thyroid disease Sister         Hyperthyroid   • Depression Sister    • Psychosis Maternal Grandfather    • Depression Daughter      Review of Systems   Constitutional: Negative.    HENT: Negative.    Eyes: Negative.    Respiratory: Negative.    Cardiovascular: Negative.    Gastrointestinal: Negative.    Endocrine: Negative.    Musculoskeletal: Negative.    Skin: Negative.    Allergic/Immunologic: Negative.    Hematological: Negative.      Vitals:    08/11/22 1152   BP: 148/70   Pulse: 65   Temp: 97.5 °F (36.4 °C)     Physical Exam  Vitals and nursing note reviewed.   Constitutional:       Appearance: Normal appearance. She is well-developed and normal weight.   HENT:      Head: Normocephalic and atraumatic.   Eyes:      General: No scleral icterus.     Pupils: Pupils are equal, round, and reactive to light.   Cardiovascular:      Rate and Rhythm: Normal rate and regular  rhythm.      Heart sounds: Normal heart sounds. No murmur heard.    No friction rub. No gallop.   Pulmonary:      Effort: Pulmonary effort is normal.      Breath sounds: Normal breath sounds. No wheezing or rales.   Abdominal:      General: Bowel sounds are normal. There is no distension or abdominal bruit.      Palpations: Abdomen is soft. Abdomen is not rigid. There is no shifting dullness, fluid wave, mass or pulsatile mass.      Tenderness: There is no abdominal tenderness. There is no guarding.      Hernia: No hernia is present.   Musculoskeletal:         General: No swelling or tenderness. Normal range of motion.      Cervical back: Normal range of motion and neck supple. No rigidity.   Skin:     General: Skin is warm and dry.      Coloration: Skin is not jaundiced.      Findings: No rash.   Neurological:      General: No focal deficit present.      Mental Status: She is alert and oriented to person, place, and time.      Cranial Nerves: No cranial nerve deficit.   Psychiatric:         Behavior: Behavior normal.         Thought Content: Thought content normal.       Diagnoses and all orders for this visit:    Elevated alkaline phosphatase level    Patient 67-year-old female with history of elevated alkaline phosphatase over prolonged period of time.  Previous work-up scanned into computer was unremarkable.  No signs of primary biliary cholangitis, sclerosing cholangitis.  Liver imaging does show fatty liver which certainly could be an impetus for increased pressure in the liver but not necessarily causing liver damage.  Patient with persistently normal transaminases normal bilirubin and albumin.  These are indicators that no actual damage is occurring.  Patient is hep C antibody positive but virus negative.  At this point no indication that actual liver disease is occurring would recommend yearly follow-up on her CMP with plans on further work-up only if transaminases begin to elevate.  We will follow-up  clinically.

## 2023-04-26 ENCOUNTER — TRANSCRIBE ORDERS (OUTPATIENT)
Dept: ADMINISTRATIVE | Facility: HOSPITAL | Age: 68
End: 2023-04-26
Payer: MEDICARE

## 2023-04-26 DIAGNOSIS — Z12.31 SCREENING MAMMOGRAM FOR BREAST CANCER: Primary | ICD-10-CM

## 2023-05-12 ENCOUNTER — HOSPITAL ENCOUNTER (OUTPATIENT)
Dept: MAMMOGRAPHY | Facility: HOSPITAL | Age: 68
Discharge: HOME | End: 2023-05-12
Admitting: NURSE PRACTITIONER
Payer: MEDICARE

## 2023-05-12 DIAGNOSIS — Z12.31 SCREENING MAMMOGRAM FOR BREAST CANCER: ICD-10-CM

## 2023-05-12 PROCEDURE — 77067 SCR MAMMO BI INCL CAD: CPT

## 2023-05-12 PROCEDURE — 77063 BREAST TOMOSYNTHESIS BI: CPT

## 2023-07-23 PROBLEM — E66.812 CLASS 2 SEVERE OBESITY DUE TO EXCESS CALORIES WITH SERIOUS COMORBIDITY AND BODY MASS INDEX (BMI) OF 35.0 TO 35.9 IN ADULT: Status: RESOLVED | Noted: 2020-06-27 | Resolved: 2023-07-23

## 2023-07-23 PROBLEM — E66.01 CLASS 2 SEVERE OBESITY DUE TO EXCESS CALORIES WITH SERIOUS COMORBIDITY AND BODY MASS INDEX (BMI) OF 35.0 TO 35.9 IN ADULT: Status: RESOLVED | Noted: 2020-06-27 | Resolved: 2023-07-23

## 2024-06-05 ENCOUNTER — TRANSCRIBE ORDERS (OUTPATIENT)
Dept: PULMONOLOGY | Facility: HOSPITAL | Age: 69
End: 2024-06-05
Payer: MEDICARE

## 2024-06-05 ENCOUNTER — TRANSCRIBE ORDERS (OUTPATIENT)
Dept: ADMINISTRATIVE | Facility: HOSPITAL | Age: 69
End: 2024-06-05
Payer: MEDICARE

## 2024-06-05 DIAGNOSIS — M81.0 AGE-RELATED OSTEOPOROSIS WITHOUT CURRENT PATHOLOGICAL FRACTURE: Primary | ICD-10-CM

## 2024-06-05 DIAGNOSIS — Z12.31 ENCOUNTER FOR SCREENING MAMMOGRAM FOR MALIGNANT NEOPLASM OF BREAST: Primary | ICD-10-CM

## 2024-07-17 ENCOUNTER — OFFICE VISIT (OUTPATIENT)
Dept: SLEEP MEDICINE | Facility: HOSPITAL | Age: 69
End: 2024-07-17
Payer: MEDICARE

## 2024-07-17 VITALS — HEIGHT: 64 IN | WEIGHT: 180.4 LBS | BODY MASS INDEX: 30.8 KG/M2 | OXYGEN SATURATION: 97 % | HEART RATE: 72 BPM

## 2024-07-17 DIAGNOSIS — G47.36 HYPOXEMIA ASSOCIATED WITH SLEEP: Primary | ICD-10-CM

## 2024-07-17 DIAGNOSIS — G47.33 OSA ON CPAP: ICD-10-CM

## 2024-07-17 PROCEDURE — G0463 HOSPITAL OUTPT CLINIC VISIT: HCPCS

## 2024-07-17 NOTE — PROGRESS NOTES
"Follow Up Sleep Disorders Center Note     Chief Complaint:  DARRION     Primary Care Physician: Elaine Fernandez APRN    Interval History:   The patient is a 69 y.o. female who I last saw 7/19/2023 and that note was reviewed.  The patient reports she is doing well without new complaints.  She goes to bed 11 PM gets out of bed at 9 AM.    The patient did have knee replacement 2/28/2024 without problems.    Review of Systems:    A complete review of systems was done and all were negative with the exception of the above    Social History:    Social History     Socioeconomic History    Marital status:     Years of education: High school   Tobacco Use    Smoking status: Every Day     Current packs/day: 0.50     Average packs/day: 0.5 packs/day for 30.0 years (15.0 ttl pk-yrs)     Types: Cigarettes    Smokeless tobacco: Never   Substance and Sexual Activity    Alcohol use: No    Drug use: No    Sexual activity: Yes     Partners: Male     Birth control/protection: Post-menopausal, Other     Comment: Hysterectomy       Allergies:  Vimovo [naproxen-esomeprazole mg] and Codeine     Medication Review:  Reviewed.      Vital Signs:    Vitals:    07/17/24 1112   Pulse: 72   SpO2: 97%   Weight: 81.8 kg (180 lb 6.4 oz)   Height: 162.6 cm (64\")     Body mass index is 30.97 kg/m².    Physical Exam:    Constitutional:  Well developed 69 y.o. female that appears in no apparent distress.  Awake & oriented times 3.  Normal mood with normal recent and remote memory and normal judgement.  Eyes:  Conjunctivae normal.  Oropharynx: Previously, moist mucous membranes without exudate and a large tongue and uvula and uvula is slightly deformed and deviated to the patient's left and she has a patent posterior pharyngeal opening class II-3 Mallampati airway.    Self-administered [place] Sleepiness Scale test results: 3  0-5 Lower normal daytime sleepiness  6-10 Higher normal daytime sleepiness  11-12 Mild, 13-15 Moderate, & 16-24 Severe " excessive daytime sleepiness     Downloaded PAP Data Evaluated For Therapeutic Response and Compliance:  DME is [facility] and she uses a fullface mask.  Downloads between 4/17 and 7/15/2024 compliance 100%.  Average usage is 9 hours and 27 minutes.  Average AHI is normal with a borderline leak.  Average auto CPAP pressure is 16.4 and her ResMed auto CPAP is 12-20    I have reviewed the above results and compared them with the patient's last downloads and reviewed with the patient.    Impression:   Obstructive sleep apnea with sleep-related hypoxia by overnight polysomnogram 6/4/2009 adequately treated with ResMed auto CPAP. The patient appears to be at goal with good compliance and usage. The patient has no complaints of hypersomnolence.     Plan:  Good sleep hygiene measures should be maintained.  Weight loss would be beneficial in this patient who is obese by Body mass index is 30.97 kg/m².  When last seen 7/19/2023 weight was 198 pounds and presently today it is 180 pounds    After evaluating the patient and assessing results available, the patient is benefiting from the treatment being provided.     The patient will continue ResMed auto CPAP.  Potential side effects of not using PAP therapy reviewed and addressed as needed.  After clinical evaluation and review of downloads, I recommend no changes to the patient's pressures.  A new prescription will be sent to the patient's DME.    I answered all of the patient's questions.  The patient will call the Sleep Disorder Center for any problems and will follow up in 1 year.      Hector Trujillo MD  Sleep Medicine  07/17/24  11:28 EDT

## 2024-07-21 PROBLEM — G47.14 HYPERSOMNIA DUE TO MEDICAL CONDITION: Status: RESOLVED | Noted: 2022-06-18 | Resolved: 2024-07-21

## 2024-08-08 ENCOUNTER — HOSPITAL ENCOUNTER (OUTPATIENT)
Dept: MAMMOGRAPHY | Facility: HOSPITAL | Age: 69
Discharge: HOME | End: 2024-08-08
Payer: MEDICARE

## 2024-08-08 ENCOUNTER — HOSPITAL ENCOUNTER (OUTPATIENT)
Dept: BONE DENSITY | Facility: HOSPITAL | Age: 69
Discharge: HOME | End: 2024-08-08
Payer: MEDICARE

## 2024-08-08 DIAGNOSIS — Z12.31 ENCOUNTER FOR SCREENING MAMMOGRAM FOR MALIGNANT NEOPLASM OF BREAST: ICD-10-CM

## 2024-08-08 DIAGNOSIS — M81.0 AGE-RELATED OSTEOPOROSIS WITHOUT CURRENT PATHOLOGICAL FRACTURE: ICD-10-CM

## 2024-08-08 PROCEDURE — 77067 SCR MAMMO BI INCL CAD: CPT

## 2024-08-08 PROCEDURE — 77063 BREAST TOMOSYNTHESIS BI: CPT

## 2024-08-08 PROCEDURE — 77080 DXA BONE DENSITY AXIAL: CPT

## 2024-09-24 NOTE — PROGRESS NOTES
"[facility]  DEISY BOWLES M.D.      Kaci Vazquezl / 62 y.o. / female  01/12/2018      MEDICATIONS  Current Outpatient Prescriptions   Medication Sig Dispense Refill   • acebutolol (SECTRAL) 200 MG capsule take 1 capsule by mouth once daily 30 capsule 5   • albuterol (PROVENTIL HFA;VENTOLIN HFA) 108 (90 BASE) MCG/ACT inhaler Inhale 2 puffs 2 (Two) Times a Day.     • ALPRAZolam (XANAX) 1 MG tablet Take 1 tablet by mouth 2 (Two) Times a Day As Needed for Anxiety. 36 tablet 0   • aspirin 81 MG chewable tablet Chew 81 mg Daily.     • atorvastatin (LIPITOR) 20 MG tablet Take 20 mg by mouth Every Night.     • buPROPion SR (WELLBUTRIN SR) 150 MG 12 hr tablet take 1 tablet by mouth twice a day 60 tablet 1   • butalbital-acetaminophen  MG tablet tablet Take 1 tablet by mouth 2 (Two) Times a Day As Needed (tension headaches). 60 tablet 0   • cetirizine (ZYRTEC ALLERGY) 10 MG tablet Take 10 mg by mouth daily.     • Cholecalciferol (VITAMIN D3) 2000 UNITS tablet Take 2,000 Units by mouth Daily.     • Cranberry 300 MG tablet Take 300 mg by mouth Daily.     • FLUoxetine (PROzac) 60 MG tablet take 1 tablet by mouth daily 30 tablet 5   • fluticasone-salmeterol (ADVAIR) 500-50 MCG/DOSE DISKUS Inhale 1 puff 2 (Two) Times a Day.     • folic acid (FOLVITE) 1 MG tablet take 1 tablet by mouth once daily 30 tablet 5   • levothyroxine (SYNTHROID, LEVOTHROID) 125 MCG tablet take 1/2 tablet by mouth once daily 30 tablet 3   • montelukast (SINGULAIR) 10 MG tablet Take 10 mg by mouth Every Night.     • PROAIR  (90 Base) MCG/ACT inhaler inhale 2 puffs by mouth every 6 hours if needed 8.5 inhaler 1     No current facility-administered medications for this visit.          VITALS:    Visit Vitals   • /72   • Pulse 68   • Temp 97.2 °F (36.2 °C)   • Ht 162.6 cm (64.02\")   • Wt 93 kg (205 lb)   • SpO2 99%   • BMI 35.17 kg/m2       BP Readings from Last 3 Encounters:   01/12/18 122/72   10/27/17 140/90   09/12/17 124/68 "     Wt Readings from Last 3 Encounters:   18 93 kg (205 lb)   10/27/17 92.1 kg (203 lb)   17 91.2 kg (201 lb)      Body mass index is 35.17 kg/(m^2).    CC:  Main reason(s) for today's visit: Anxiety and Depression      HPI:     Chronic major depression on fluoxetine 60 mg daily and restarted bupropion previously for smoking cessation. Takes alprazolam 1 mg up to twice daily for anxiety and anxiety related sleep difficulty. Unfortunately is still smoking the same qty. She reports detailed history of emotional problems relating to taking care of her  mother near the end of her life and the extensive family h/o major depression. Denies SI but depression is ongoing. Previously saw a psychiatrist but never really pursued meaningful counseling/therapy.     Patient Care Team:  Sarwat Valenzuela MD as PCP - General (Internal Medicine)  Giorgi VIERA MD as Consulting Physician (Endocrinology)  Ameya Granda MD as Consulting Physician (Urology)  Giovana Mooney MD as Surgeon (General Surgery)  Hector Trujillo MD as Consulting Physician (Sleep Medicine)  ____________________________________________________________________    ASSESSMENT & PLAN:    Problem List Items Addressed This Visit        Nervous and Auditory    Cigarette nicotine dependence with nicotine-induced disorder (Chronic)    Current Assessment & Plan     Advised to quit smoking and continue bupropion. Still smoking the same qty.          Relevant Medications    FLUoxetine (PROzac) 60 MG tablet    buPROPion SR (WELLBUTRIN SR) 150 MG 12 hr tablet    ALPRAZolam (XANAX) 1 MG tablet       Other    Moderate episode of recurrent major depressive disorder - Primary (Chronic)    Overview     Continue fluoxetine 60 mg qd; will resume bupropion for smoking cessation.   Continue alprazolam 1 mg bid prn for significant anxiety. Consent/agreement updated.          Current Assessment & Plan     Spent 40 minutes in face-to-face encounter time and  >50% of time spent in counseling regarding above medical problems/issues.  Discussed need for therapy and counseling, recommended strongly that she establish with a psychiatrist. Continue fluoxetine and bupropion and minimized dependence on alprazolam. Discussed with patient's spouse who was present.          Relevant Medications    FLUoxetine (PROzac) 60 MG tablet    buPROPion SR (WELLBUTRIN SR) 150 MG 12 hr tablet    ALPRAZolam (XANAX) 1 MG tablet        No orders of the defined types were placed in this encounter.      Summary/Discussion:  ·     Return in about 3 months (around 4/12/2018) for Depression.    Future Appointments  Date Time Provider Department Center   1/16/2018 11:10 AM [facility] None   1/23/2018 1:45 PM Giorgi VIERA MD [facility] None   3/5/2018 10:00 AM Latanya Hickman MD [facility] None   11/8/2018 11:15 AM Hector Trujillo MD [facility] None       ____________________________________________________________________    REVIEW OF SYSTEMS    Review of Systems  Constitutional neg  Resp neg  CV neg  Neuro neg  Psych no SI      PHYSICAL EXAMINATION    Physical Exam  Constitutional  No distress, obese  Psychiatric  Alert. Judgment and thought content normal. Mood depressed without SI    REVIEWED DATA:    Labs:     Lab Results   Component Value Date     08/17/2017    K 4.4 08/17/2017    AST 14 08/03/2017    ALT 21 08/03/2017    BUN 7 (L) 08/17/2017    CREATININE 0.80 08/17/2017    CREATININE 0.79 08/14/2017    CREATININE 0.82 08/03/2017    EGFRIFNONA 73 08/17/2017    EGFRIFAFRI 88 08/17/2017       Lab Results   Component Value Date    HGBA1C 5.3 02/23/2016    HGBA1C 5.4 04/09/2015    GLU 79 08/17/2017     (H) 03/06/2017    GLU 96 02/23/2016       Lab Results   Component Value Date     (H) 05/09/2017     02/23/2016     (H) 04/09/2015    HDL 45 05/09/2017    TRIG 103 05/09/2017    CHOLMARLEYO 3.73 05/09/2017        Lab Results   Component Value Date    TSH 0.688 09/28/2017    FREET4 1.09 09/28/2017       Lab Results   Component Value Date    WBC 4.85 08/14/2017    HGB 14.1 08/14/2017    HGB 13.9 08/03/2017    HGB 13.7 12/21/2016     08/14/2017       Imaging:         Medical Tests:         Summary of old records / correspondence / consultant report:         Request outside records:         ALLERGIES  Allergies   Allergen Reactions   • Codeine Nausea And Vomiting   • Zofran [Ondansetron Hcl] Other (See Comments)     CONSTIPATION        PFSH:     The following portions of the patient's history were reviewed and updated as appropriate: Allergies / Current Medications / Past Medical History / Surgical History / Social History / Family History    PROBLEM LIST   Patient Active Problem List   Diagnosis   • Atopic rhinitis   • Migraine without aura and without status migrainosus, not intractable   • Common variable agammaglobulinemia   • Moderate episode of recurrent major depressive disorder   • Fibromyalgia   • Hyperlipidemia   • Multinodular goiter   • Osteoarthritis   • Asthmatic bronchitis   • Subclinical hypothyroidism   • Chronic mixed headache syndrome   • Impaired fasting glucose   • Abdominal aortic atherosclerosis   • Cigarette nicotine dependence with nicotine-induced disorder   • Abnormal weight gain   • DARRION on autoCPAP       PAST MEDICAL HISTORY  Past Medical History:   Diagnosis Date   • Anxiety and depression    • Aortic sclerosis    • Asthma    • CVID (common variable immunodeficiency)    • Deep vein thrombosis     BEHIND LEFT KNEE   • Fibromyalgia    • Fibromyalgia, primary    • History of pneumonia    • Hyperlipidemia    • Hypothyroidism    • Lupus     skin   • Migraines    • MVP (mitral valve prolapse)    • Osteoarthritis    • Osteopenia    • Osteoporosis    • PONV (postoperative nausea and vomiting)    • Pulmonary embolism    • Recurrent UTI    • Sleep apnea     CPAP Machine #11       SURGICAL  HISTORY  Past Surgical History:   Procedure Laterality Date   • CARDIAC CATHETERIZATION     • CHOLECYSTECTOMY     • COLONOSCOPY     • EPIDURAL BLOCK     • HYSTERECTOMY     • KNEE ARTHROSCOPY      calcium scraping   • NECK SURGERY      C 5 AND 6 TITANIUM PLATE   • PARTIAL HYSTERECTOMY     • SKIN BIOPSY      benign       SOCIAL HISTORY  Social History     Social History   • Marital status:      Spouse name: N/A   • Number of children: N/A   • Years of education: N/A     Occupational History   • Unemployed currently      Social History Main Topics   • Smoking status: Current Every Day Smoker     Packs/day: 1.00     Years: 30.00     Types: Cigarettes   • Smokeless tobacco: Never Used   • Alcohol use No   • Drug use: No   • Sexual activity: Yes     Partners: Male     Birth control/ protection: Post-menopausal, Other      Comment: Hysterectomy     Other Topics Concern   • None     Social History Narrative       FAMILY HISTORY  Family History   Problem Relation Age of Onset   • Thyroid disease Mother      Hypothyroid   • ALS Mother    • No Known Problems Father      N/A   • Breast cancer Sister    • Cerebral aneurysm Sister    • Depression Sister    • Thyroid cancer Cousin    • Cerebral aneurysm Sister    • Depression Sister    • Arthritis Brother    • Cancer Sister      Breast   • Depression Sister    • Depression Sister    • Thyroid disease Sister      Hyperthyroid   • Depression Sister    • Psychosis Maternal Grandfather    • Depression Daughter    • Malig Hyperthermia Neg Hx          **Dragon Disclaimer:   Much of this encounter note is an electronic transcription/translation of spoken language to printed text. The electronic translation of spoken language may permit erroneous, or at times, nonsensical words or phrases to be inadvertently transcribed. Although I have reviewed the note for such errors, some may still exist.    normal...

## 2025-05-12 NOTE — PROGRESS NOTES
----- Message from Melissa sent at 5/12/2025 10:35 AM CDT -----  Name of Caller:LEANA ALMENDAREZ [[id number]]  When is the first available appointment? N/a   Best Call Back Number Telephone Information:Mobile          [phone]  Additional Information: pt is requesting to be seen in the [place] location. Please advise   Follow Up [facility] Note       Patient Care Team:  Sarwat Valenzuela MD as PCP - General (Internal Medicine)  Giorgi VIERA MD as Consulting Physician (Endocrinology)  Ameya Granda MD as Consulting Physician (Urology)  Giovana Mooney MD as Surgeon (General Surgery)  Hector Trujillo MD as Consulting Physician (Sleep Medicine)    Chief Complaint:  DARRION     Interval History:   The patient was last seen by me in August of this year.  The patient obtained a new auto CPAP.  The patient is unchanged and feels like this device is better because it is quiet.  She goes to sleep at 10 PM and awakens at 7 AM.  She does not awaken.  [place] Sleepiness Scale is normal at 3.    Review of Systems:  Recorded on the Sleep Questionnaire.  Unremarkable except for nasal congestion.    Social History:    Social History     Social History   • Marital status:      Spouse name: N/A   • Number of children: N/A   • Years of education: N/A     Occupational History   • Unemployed currently      Social History Main Topics   • Smoking status: Current Every Day Smoker     Packs/day: 1.00     Years: 30.00     Types: Cigarettes   • Smokeless tobacco: Never Used   • Alcohol use No   • Drug use: No   • Sexual activity: Yes     Partners: Male     Birth control/ protection: Post-menopausal, Other      Comment: Hysterectomy     Other Topics Concern   • Not on file     Social History Narrative       Allergies:  Codeine and Zofran [ondansetron hcl]     Medication Review:  Reviewed.      Vital Signs:  Height 64 inches and weight 200 pounds and she is obese with a body mass index of 34.    Physical Exam:    Constitutional:  Well developed white female and appears in no apparent distress.  Awake & oriented times 3.  Normal mood with normal recent and remote memory and normal judgement.  Eyes:  Conjunctivae normal.  Oropharynx:  moist mucous membranes without exudate and a large tongue and large uvula, uvula slightly deforming  deviated to the left, patent posterior pharyngeal opening and class II-III MP airway.      Results Review:  DME is [facility] and she wears a nasal mask.  Downloads between August 10 and November 7, 2017 reveals compliance to be 100% and average usage 8 hours and 41 minutes and average AHI normal without leak and average auto CPAP pressure of 16.7.       Impression:   Obstructive sleep apnea adequately treated with auto titrating CPAP with good compliance and usage and no complaints of hypersomnolence.      Plan:  Good sleep hygiene measures should be maintained.  Weight loss would be beneficial in this patient who is obese by BMI.  The patient is benefiting from the treatment being provided.     The patient will continue auto CPAP.  Due to complaints of not getting enough pressure, auto CPAP will be changed to 12-20.    The patient will call for any problems and will follow up in one year.      Hector Trujillo MD  11/08/17  11:42 AM

## 2025-07-16 ENCOUNTER — OFFICE VISIT (OUTPATIENT)
Dept: SLEEP MEDICINE | Facility: HOSPITAL | Age: 70
End: 2025-07-16
Payer: MEDICARE

## 2025-07-16 VITALS — WEIGHT: 180 LBS | HEIGHT: 64 IN | OXYGEN SATURATION: 95 % | HEART RATE: 96 BPM | BODY MASS INDEX: 30.73 KG/M2

## 2025-07-16 DIAGNOSIS — G47.33 OSA ON CPAP: ICD-10-CM

## 2025-07-16 DIAGNOSIS — G47.36 HYPOXEMIA ASSOCIATED WITH SLEEP: Primary | ICD-10-CM

## 2025-07-16 PROCEDURE — G0463 HOSPITAL OUTPT CLINIC VISIT: HCPCS

## 2025-07-16 NOTE — PROGRESS NOTES
"[facility]  Follow Up Sleep Disorders Center Note     Chief Complaint:  DARRION     Primary Care Physician: Elaine Fernandez APRN    Interval History:   The patient is a 70 y.o. female who I last saw 7/17/2024 and that note was reviewed. The patient reports she is doing well without new complaints. She goes to bed 11 PM gets out of bed between 8 and 9 AM.     Review of Systems:    A complete review of systems was done and all were negative with the exception of the above    Social History:    Social History     Socioeconomic History    Marital status:     Years of education: High school   Tobacco Use    Smoking status: Every Day     Current packs/day: 0.50     Average packs/day: 0.5 packs/day for 30.0 years (15.0 ttl pk-yrs)     Types: Cigarettes    Smokeless tobacco: Never   Substance and Sexual Activity    Alcohol use: No    Drug use: No    Sexual activity: Yes     Partners: Male     Birth control/protection: Post-menopausal, Other     Comment: Hysterectomy       Allergies:  Vimovo [naproxen-esomeprazole mg] and Codeine     Medication Review:  Reviewed.      Vital Signs:    Vitals:    07/16/25 1108   Pulse: 96   SpO2: 95%   Weight: 81.6 kg (180 lb)   Height: 162.6 cm (64\")     Body mass index is 30.9 kg/m².    Physical Exam:    Constitutional:  Well developed 70 y.o. female that appears in no apparent distress.  Awake & oriented times 3.  Normal mood with normal recent and remote memory and normal judgement.  Eyes:  Conjunctivae normal.  Oropharynx: Previously, moist mucous membranes without exudate and a large tongue and uvula and uvula is slightly deformed and deviated to the patient's left and she has a patent posterior pharyngeal opening class II-3 Mallampati airway.     Self-administered [place] Sleepiness Scale test results:  4  0-5 Lower normal daytime sleepiness  6-10 Higher normal daytime sleepiness  11-12 Mild, 13-15 Moderate, & 16-24 Severe excessive daytime sleepiness     Downloaded " PAP Data Evaluated For Therapeutic Response and Compliance:  DME is [facility] and she uses a fullface mask.  Downloads between 4/16 and 7/14/2025 compliance 100%.  Average usage is 9 hours and 25 minutes.  Average AHI is normal without a significant leak.  Average auto CPAP pressure is 17.3 and her ResMed auto CPAP is 12-20    I have reviewed the above results and compared them with the patient's last downloads and reviewed with the patient.    Impression:   Obstructive sleep apnea with sleep-related hypoxia by overnight polysomnogram 6/4/2009 adequately treated with ResMed auto CPAP. The patient is at goal with good compliance and usage. The patient has no complaints of hypersomnolence.     Plan:  Good sleep hygiene measures should be maintained.  Weight loss would be beneficial in this patient who is obese by Body mass index is 30.9 kg/m².      BMI is >= 30 and <35. (Class 1 Obesity). The following options were offered after discussion;: information on healthy weight added to patient's after visit summary        After evaluating the patient and assessing results available, the patient is benefiting from the treatment being provided.     The patient will continue ResMed auto CPAP for their ongoing obstructive sleep apnea treatment.  Potential side effects of not using PAP therapy reviewed and addressed as needed.  After clinical evaluation and review of downloads, I recommend no changes to the patient's pressures.  A new prescription will be sent to the patient's DME.    I answered all of the patient's questions.  The patient will call the Sleep Disorder Center for any problems and will follow up for obstructive sleep apnea care in 1 year.      Hector Trujillo MD  Sleep Medicine  07/16/25  11:11 EDT

## (undated) DEVICE — TUBING, SUCTION, 1/4" X 10', STRAIGHT: Brand: MEDLINE

## (undated) DEVICE — BITEBLOCK OMNI BLOC

## (undated) DEVICE — CANN NASL CO2 TRULINK W/O2 A/

## (undated) DEVICE — THE TORRENT IRRIGATION SCOPE CONNECTOR IS USED WITH THE TORRENT IRRIGATION TUBING TO PROVIDE IRRIGATION FLUIDS SUCH AS STERILE WATER DURING GASTROINTESTINAL ENDOSCOPIC PROCEDURES WHEN USED IN CONJUNCTION WITH AN IRRIGATION PUMP (OR ELECTROSURGICAL UNIT).: Brand: TORRENT

## (undated) DEVICE — Device: Brand: DEFENDO AIR/WATER/SUCTION AND BIOPSY VALVE

## (undated) DEVICE — SNAR POLYP SENSATION STDOVL 27 240 BX40

## (undated) DEVICE — THE SINGLE USE ETRAP – POLYP TRAP IS USED FOR SUCTION RETRIEVAL OF ENDOSCOPICALLY REMOVED POLYPS.: Brand: ETRAP